# Patient Record
Sex: FEMALE | Race: WHITE | NOT HISPANIC OR LATINO
[De-identification: names, ages, dates, MRNs, and addresses within clinical notes are randomized per-mention and may not be internally consistent; named-entity substitution may affect disease eponyms.]

---

## 2017-03-20 ENCOUNTER — APPOINTMENT (OUTPATIENT)
Dept: DERMATOLOGY | Facility: CLINIC | Age: 72
End: 2017-03-20

## 2017-05-18 ENCOUNTER — APPOINTMENT (OUTPATIENT)
Dept: GASTROENTEROLOGY | Facility: CLINIC | Age: 72
End: 2017-05-18

## 2017-05-18 VITALS
BODY MASS INDEX: 24.84 KG/M2 | HEART RATE: 68 BPM | WEIGHT: 135 LBS | DIASTOLIC BLOOD PRESSURE: 70 MMHG | HEIGHT: 62 IN | OXYGEN SATURATION: 98 % | RESPIRATION RATE: 16 BRPM | SYSTOLIC BLOOD PRESSURE: 100 MMHG

## 2017-05-18 RX ORDER — MV-MIN/FOLIC/VIT K/LYCOP/COQ10 200-100MCG
CAPSULE ORAL
Refills: 0 | Status: ACTIVE | COMMUNITY

## 2017-07-24 ENCOUNTER — APPOINTMENT (OUTPATIENT)
Dept: INTERNAL MEDICINE | Facility: CLINIC | Age: 72
End: 2017-07-24

## 2017-10-09 ENCOUNTER — RX RENEWAL (OUTPATIENT)
Age: 72
End: 2017-10-09

## 2018-08-10 ENCOUNTER — RECORD ABSTRACTING (OUTPATIENT)
Age: 73
End: 2018-08-10

## 2018-08-10 DIAGNOSIS — R41.3 OTHER AMNESIA: ICD-10-CM

## 2018-08-10 DIAGNOSIS — Z83.42 FAMILY HISTORY OF FAMILIAL HYPERCHOLESTEROLEMIA: ICD-10-CM

## 2018-08-10 DIAGNOSIS — Z87.898 PERSONAL HISTORY OF OTHER SPECIFIED CONDITIONS: ICD-10-CM

## 2018-08-10 DIAGNOSIS — Z78.9 OTHER SPECIFIED HEALTH STATUS: ICD-10-CM

## 2018-08-10 DIAGNOSIS — M48.02 SPINAL STENOSIS, CERVICAL REGION: ICD-10-CM

## 2018-08-10 DIAGNOSIS — Z87.891 PERSONAL HISTORY OF NICOTINE DEPENDENCE: ICD-10-CM

## 2018-08-10 RX ORDER — ASPIRIN 81 MG
81 TABLET, DELAYED RELEASE (ENTERIC COATED) ORAL DAILY
Refills: 0 | Status: ACTIVE | COMMUNITY

## 2018-08-10 RX ORDER — FLUTICASONE PROPIONATE 50 UG/1
50 SPRAY, METERED NASAL DAILY
Refills: 0 | Status: ACTIVE | COMMUNITY

## 2018-09-18 ENCOUNTER — APPOINTMENT (OUTPATIENT)
Dept: INTERNAL MEDICINE | Facility: CLINIC | Age: 73
End: 2018-09-18
Payer: MEDICARE

## 2018-09-18 VITALS
HEIGHT: 62 IN | BODY MASS INDEX: 25.06 KG/M2 | DIASTOLIC BLOOD PRESSURE: 75 MMHG | WEIGHT: 136.2 LBS | SYSTOLIC BLOOD PRESSURE: 130 MMHG

## 2018-09-18 DIAGNOSIS — E61.1 IRON DEFICIENCY: ICD-10-CM

## 2018-09-18 DIAGNOSIS — Z00.00 ENCOUNTER FOR GENERAL ADULT MEDICAL EXAMINATION W/OUT ABNORMAL FINDINGS: ICD-10-CM

## 2018-09-18 PROCEDURE — G0439: CPT

## 2018-09-18 PROCEDURE — 36415 COLL VENOUS BLD VENIPUNCTURE: CPT

## 2018-09-18 NOTE — PLAN
[FreeTextEntry1] : PT HERE FOR ANNUAL FEELMIKE HAS APPT WITH CARDIOLOGY NEXT WEEK\par BP LOWER ON B BLOCKER AND 50 ALDACTONE FROM DERM WILL CONSIDER TAPER OFF BBLOCKER BUT SHE WILL REVIEWE WITH CARDIOLOGY\par WILL CHECK LABS PT WITH OSTEOPOROSIS MOOKIE REQUESTS WORK UP FOR OSTEOPOROSISI\par ALSO WITH HX OF IRON   OVERLOAD WILL CHECK LABS

## 2018-09-18 NOTE — HISTORY OF PRESENT ILLNESS
[FreeTextEntry1] : ANNUAL EXAM [de-identified] : PT HERE FOR ANNUAL EXAM FEELS OK \par ON ALDACTONE  NOW FOR HAIR LOSS BUT SAYS HER BP HAS DROPPED

## 2018-10-10 LAB
25(OH)D3 SERPL-MCNC: 24.9 NG/ML
ALBUMIN SERPL ELPH-MCNC: 4.8 G/DL
ALP BLD-CCNC: 67 U/L
ALT SERPL-CCNC: 19 U/L
ANION GAP SERPL CALC-SCNC: 13 MMOL/L
AST SERPL-CCNC: 19 U/L
BASOPHILS # BLD AUTO: 0.02 K/UL
BASOPHILS NFR BLD AUTO: 0.3 %
BILIRUB SERPL-MCNC: 0.7 MG/DL
BUN SERPL-MCNC: 20 MG/DL
CALCIUM SERPL-MCNC: 9.8 MG/DL
CALCIUM SERPL-MCNC: 9.8 MG/DL
CHLORIDE SERPL-SCNC: 99 MMOL/L
CHOLEST SERPL-MCNC: 214 MG/DL
CHOLEST/HDLC SERPL: 5.4 RATIO
CO2 SERPL-SCNC: 26 MMOL/L
CREAT SERPL-MCNC: 0.91 MG/DL
EOSINOPHIL # BLD AUTO: 0.13 K/UL
EOSINOPHIL NFR BLD AUTO: 2.2 %
FERRITIN SERPL-MCNC: 147 NG/ML
GLUCOSE SERPL-MCNC: 88 MG/DL
HBA1C MFR BLD HPLC: 5.5 %
HCT VFR BLD CALC: 39.7 %
HDLC SERPL-MCNC: 40 MG/DL
HGB BLD-MCNC: 13.9 G/DL
IMM GRANULOCYTES NFR BLD AUTO: 0.2 %
IRON SERPL-MCNC: 105 UG/DL
LDLC SERPL CALC-MCNC: NORMAL
LYMPHOCYTES # BLD AUTO: 1.43 K/UL
LYMPHOCYTES NFR BLD AUTO: 24.4 %
MAN DIFF?: NORMAL
MCHC RBC-ENTMCNC: 31.9 PG
MCHC RBC-ENTMCNC: 35 GM/DL
MCV RBC AUTO: 91.1 FL
MONOCYTES # BLD AUTO: 0.45 K/UL
MONOCYTES NFR BLD AUTO: 7.7 %
NEUTROPHILS # BLD AUTO: 3.81 K/UL
NEUTROPHILS NFR BLD AUTO: 65.2 %
PARATHYROID HORMONE INTACT: 63 PG/ML
PHOSPHATE SERPL-MCNC: 3.3 MG/DL
PLATELET # BLD AUTO: 125 K/UL
POTASSIUM SERPL-SCNC: 5.2 MMOL/L
PROT SERPL-MCNC: 7.7 G/DL
RBC # BLD: 4.36 M/UL
RBC # FLD: 12.9 %
SODIUM SERPL-SCNC: 138 MMOL/L
T4 SERPL-MCNC: 5.5 UG/DL
TRIGL SERPL-MCNC: 401 MG/DL
TSH SERPL-ACNC: 2.99 UIU/ML
WBC # FLD AUTO: 5.85 K/UL

## 2018-10-25 ENCOUNTER — MEDICATION RENEWAL (OUTPATIENT)
Age: 73
End: 2018-10-25

## 2018-12-14 DIAGNOSIS — R71.0 PRECIPITOUS DROP IN HEMATOCRIT: ICD-10-CM

## 2019-01-17 ENCOUNTER — MEDICATION RENEWAL (OUTPATIENT)
Age: 74
End: 2019-01-17

## 2019-01-28 ENCOUNTER — MEDICATION RENEWAL (OUTPATIENT)
Age: 74
End: 2019-01-28

## 2019-03-04 PROBLEM — R41.3 TRANSIENT AMNESIA: Status: ACTIVE | Noted: 2018-08-10

## 2019-04-01 ENCOUNTER — APPOINTMENT (OUTPATIENT)
Dept: UROLOGY | Facility: CLINIC | Age: 74
End: 2019-04-01
Payer: MEDICARE

## 2019-04-01 VITALS — DIASTOLIC BLOOD PRESSURE: 70 MMHG | SYSTOLIC BLOOD PRESSURE: 153 MMHG | HEART RATE: 69 BPM

## 2019-04-01 LAB
BILIRUB UR QL STRIP: NORMAL
CLARITY UR: CLEAR
COLLECTION METHOD: NORMAL
GLUCOSE UR-MCNC: NORMAL
HCG UR QL: 1 EU/DL
HGB UR QL STRIP.AUTO: NORMAL
KETONES UR-MCNC: NORMAL
LEUKOCYTE ESTERASE UR QL STRIP: NORMAL
NITRITE UR QL STRIP: NORMAL
PH UR STRIP: 6
PROT UR STRIP-MCNC: NORMAL
SP GR UR STRIP: 1.02

## 2019-04-01 PROCEDURE — 81003 URINALYSIS AUTO W/O SCOPE: CPT | Mod: QW

## 2019-04-01 PROCEDURE — 99204 OFFICE O/P NEW MOD 45 MIN: CPT | Mod: 25

## 2019-04-01 RX ORDER — OMEPRAZOLE 40 MG/1
40 CAPSULE, DELAYED RELEASE ORAL DAILY
Qty: 90 | Refills: 3 | Status: DISCONTINUED | COMMUNITY
End: 2019-04-01

## 2019-04-01 NOTE — HISTORY OF PRESENT ILLNESS
[FreeTextEntry1] : patient had gross hematuria 2 weeks ago. no dysuria frequency. no history of kidney stones. no flank or CVA pain.  no nausea or vomiting.

## 2019-04-01 NOTE — REVIEW OF SYSTEMS
[Dry Eyes] : dryness of the eyes [Constipation] : constipation [Heartburn] : heartburn [Blood in urine that you can see] : blood visible in urine [Wake up at night to urinate  How many times?  ___] : wakes up to urinate [unfilled] times during the night [Leakage of urine with straining, coughing, laughing] : leakage of urine with straining, coughing, laughing [Negative] : Heme/Lymph

## 2019-04-01 NOTE — ASSESSMENT
[FreeTextEntry1] : gross hematuria\par \par Plan:\par \par follow up 2 weeks for cysto\par CT urogram\par urine cytology\par cysto

## 2019-04-01 NOTE — LETTER BODY
[Dear  ___] : Dear  [unfilled], [Courtesy Letter:] : I had the pleasure of seeing your patient, [unfilled], in my office today. [Please see my note below.] : Please see my note below. [Sincerely,] : Sincerely, [FreeTextEntry3] : Ed\par \par Lacho Sandhu MD\par [facility]\par  of Urology\par [employer] at [facility]\par

## 2019-04-03 ENCOUNTER — FORM ENCOUNTER (OUTPATIENT)
Age: 74
End: 2019-04-03

## 2019-04-03 LAB — URINE CYTOLOGY: NORMAL

## 2019-04-04 ENCOUNTER — APPOINTMENT (OUTPATIENT)
Dept: CT IMAGING | Facility: CLINIC | Age: 74
End: 2019-04-04
Payer: MEDICARE

## 2019-04-04 ENCOUNTER — OUTPATIENT (OUTPATIENT)
Dept: OUTPATIENT SERVICES | Facility: HOSPITAL | Age: 74
LOS: 1 days | End: 2019-04-04
Payer: MEDICARE

## 2019-04-04 DIAGNOSIS — Z90.49 ACQUIRED ABSENCE OF OTHER SPECIFIED PARTS OF DIGESTIVE TRACT: Chronic | ICD-10-CM

## 2019-04-04 DIAGNOSIS — R31.0 GROSS HEMATURIA: ICD-10-CM

## 2019-04-04 DIAGNOSIS — G56.00 CARPAL TUNNEL SYNDROME, UNSPECIFIED UPPER LIMB: Chronic | ICD-10-CM

## 2019-04-04 DIAGNOSIS — Z00.00 ENCOUNTER FOR GENERAL ADULT MEDICAL EXAMINATION WITHOUT ABNORMAL FINDINGS: ICD-10-CM

## 2019-04-04 PROCEDURE — 74178 CT ABD&PLV WO CNTR FLWD CNTR: CPT

## 2019-04-04 PROCEDURE — 82565 ASSAY OF CREATININE: CPT

## 2019-04-04 PROCEDURE — 74178 CT ABD&PLV WO CNTR FLWD CNTR: CPT | Mod: 26

## 2019-04-15 ENCOUNTER — APPOINTMENT (OUTPATIENT)
Dept: UROLOGY | Facility: CLINIC | Age: 74
End: 2019-04-15
Payer: MEDICARE

## 2019-04-15 ENCOUNTER — TRANSCRIPTION ENCOUNTER (OUTPATIENT)
Age: 74
End: 2019-04-15

## 2019-04-15 VITALS — DIASTOLIC BLOOD PRESSURE: 84 MMHG | RESPIRATION RATE: 15 BRPM | HEART RATE: 65 BPM | SYSTOLIC BLOOD PRESSURE: 187 MMHG

## 2019-04-15 LAB
BILIRUB UR QL STRIP: NORMAL
CLARITY UR: CLEAR
COLLECTION METHOD: NORMAL
GLUCOSE UR-MCNC: NORMAL
HCG UR QL: 0.2 EU/DL
HGB UR QL STRIP.AUTO: ABNORMAL
KETONES UR-MCNC: NORMAL
LEUKOCYTE ESTERASE UR QL STRIP: ABNORMAL
NITRITE UR QL STRIP: NORMAL
PH UR STRIP: 6
PROT UR STRIP-MCNC: NORMAL
SP GR UR STRIP: 1.02

## 2019-04-15 PROCEDURE — 81003 URINALYSIS AUTO W/O SCOPE: CPT | Mod: QW

## 2019-04-15 PROCEDURE — 52000 CYSTOURETHROSCOPY: CPT

## 2019-04-25 ENCOUNTER — OUTPATIENT (OUTPATIENT)
Dept: OUTPATIENT SERVICES | Facility: HOSPITAL | Age: 74
LOS: 1 days | End: 2019-04-25
Payer: MEDICARE

## 2019-04-25 VITALS
HEIGHT: 62 IN | HEART RATE: 61 BPM | TEMPERATURE: 97 F | SYSTOLIC BLOOD PRESSURE: 143 MMHG | DIASTOLIC BLOOD PRESSURE: 68 MMHG | WEIGHT: 135.58 LBS | RESPIRATION RATE: 20 BRPM

## 2019-04-25 DIAGNOSIS — Z01.818 ENCOUNTER FOR OTHER PREPROCEDURAL EXAMINATION: ICD-10-CM

## 2019-04-25 DIAGNOSIS — Z90.49 ACQUIRED ABSENCE OF OTHER SPECIFIED PARTS OF DIGESTIVE TRACT: Chronic | ICD-10-CM

## 2019-04-25 DIAGNOSIS — I10 ESSENTIAL (PRIMARY) HYPERTENSION: ICD-10-CM

## 2019-04-25 DIAGNOSIS — G56.00 CARPAL TUNNEL SYNDROME, UNSPECIFIED UPPER LIMB: Chronic | ICD-10-CM

## 2019-04-25 DIAGNOSIS — D49.4 NEOPLASM OF UNSPECIFIED BEHAVIOR OF BLADDER: ICD-10-CM

## 2019-04-25 DIAGNOSIS — Z29.9 ENCOUNTER FOR PROPHYLACTIC MEASURES, UNSPECIFIED: ICD-10-CM

## 2019-04-25 DIAGNOSIS — Z98.890 OTHER SPECIFIED POSTPROCEDURAL STATES: Chronic | ICD-10-CM

## 2019-04-25 LAB
ANION GAP SERPL CALC-SCNC: 14 MMOL/L — SIGNIFICANT CHANGE UP (ref 5–17)
APPEARANCE UR: CLEAR — SIGNIFICANT CHANGE UP
APTT BLD: 28.6 SEC — SIGNIFICANT CHANGE UP (ref 27.5–36.3)
BACTERIA # UR AUTO: ABNORMAL
BASOPHILS # BLD AUTO: 0 K/UL — SIGNIFICANT CHANGE UP (ref 0–0.2)
BASOPHILS NFR BLD AUTO: 0.3 % — SIGNIFICANT CHANGE UP (ref 0–2)
BILIRUB UR-MCNC: NEGATIVE — SIGNIFICANT CHANGE UP
BUN SERPL-MCNC: 23 MG/DL — HIGH (ref 8–20)
CALCIUM SERPL-MCNC: 9.8 MG/DL — SIGNIFICANT CHANGE UP (ref 8.6–10.2)
CHLORIDE SERPL-SCNC: 100 MMOL/L — SIGNIFICANT CHANGE UP (ref 98–107)
CO2 SERPL-SCNC: 25 MMOL/L — SIGNIFICANT CHANGE UP (ref 22–29)
COLOR SPEC: YELLOW — SIGNIFICANT CHANGE UP
CREAT SERPL-MCNC: 0.86 MG/DL — SIGNIFICANT CHANGE UP (ref 0.5–1.3)
DIFF PNL FLD: NEGATIVE — SIGNIFICANT CHANGE UP
EOSINOPHIL # BLD AUTO: 0.1 K/UL — SIGNIFICANT CHANGE UP (ref 0–0.5)
EOSINOPHIL NFR BLD AUTO: 2 % — SIGNIFICANT CHANGE UP (ref 0–6)
EPI CELLS # UR: NEGATIVE — SIGNIFICANT CHANGE UP
GLUCOSE SERPL-MCNC: 119 MG/DL — HIGH (ref 70–115)
GLUCOSE UR QL: NEGATIVE MG/DL — SIGNIFICANT CHANGE UP
HCT VFR BLD CALC: 39.1 % — SIGNIFICANT CHANGE UP (ref 37–47)
HGB BLD-MCNC: 13.6 G/DL — SIGNIFICANT CHANGE UP (ref 12–16)
INR BLD: 0.96 RATIO — SIGNIFICANT CHANGE UP (ref 0.88–1.16)
KETONES UR-MCNC: NEGATIVE — SIGNIFICANT CHANGE UP
LEUKOCYTE ESTERASE UR-ACNC: ABNORMAL
LYMPHOCYTES # BLD AUTO: 1.2 K/UL — SIGNIFICANT CHANGE UP (ref 1–4.8)
LYMPHOCYTES # BLD AUTO: 19.9 % — LOW (ref 20–55)
MCHC RBC-ENTMCNC: 31.2 PG — HIGH (ref 27–31)
MCHC RBC-ENTMCNC: 34.8 G/DL — SIGNIFICANT CHANGE UP (ref 32–36)
MCV RBC AUTO: 89.7 FL — SIGNIFICANT CHANGE UP (ref 81–99)
MONOCYTES # BLD AUTO: 0.4 K/UL — SIGNIFICANT CHANGE UP (ref 0–0.8)
MONOCYTES NFR BLD AUTO: 7 % — SIGNIFICANT CHANGE UP (ref 3–10)
NEUTROPHILS # BLD AUTO: 4.1 K/UL — SIGNIFICANT CHANGE UP (ref 1.8–8)
NEUTROPHILS NFR BLD AUTO: 70.5 % — SIGNIFICANT CHANGE UP (ref 37–73)
NITRITE UR-MCNC: NEGATIVE — SIGNIFICANT CHANGE UP
PH UR: 6 — SIGNIFICANT CHANGE UP (ref 5–8)
PLATELET # BLD AUTO: 212 K/UL — SIGNIFICANT CHANGE UP (ref 150–400)
POTASSIUM SERPL-MCNC: 4.8 MMOL/L — SIGNIFICANT CHANGE UP (ref 3.5–5.3)
POTASSIUM SERPL-SCNC: 4.8 MMOL/L — SIGNIFICANT CHANGE UP (ref 3.5–5.3)
PROT UR-MCNC: 15 MG/DL
PROTHROM AB SERPL-ACNC: 11 SEC — SIGNIFICANT CHANGE UP (ref 10–12.9)
RBC # BLD: 4.36 M/UL — LOW (ref 4.4–5.2)
RBC # FLD: 12.5 % — SIGNIFICANT CHANGE UP (ref 11–15.6)
RBC CASTS # UR COMP ASSIST: NEGATIVE /HPF — SIGNIFICANT CHANGE UP (ref 0–4)
SODIUM SERPL-SCNC: 139 MMOL/L — SIGNIFICANT CHANGE UP (ref 135–145)
SP GR SPEC: 1.02 — SIGNIFICANT CHANGE UP (ref 1.01–1.02)
UROBILINOGEN FLD QL: NEGATIVE MG/DL — SIGNIFICANT CHANGE UP
WBC # BLD: 5.9 K/UL — SIGNIFICANT CHANGE UP (ref 4.8–10.8)
WBC # FLD AUTO: 5.9 K/UL — SIGNIFICANT CHANGE UP (ref 4.8–10.8)
WBC UR QL: ABNORMAL

## 2019-04-25 PROCEDURE — 81001 URINALYSIS AUTO W/SCOPE: CPT

## 2019-04-25 PROCEDURE — 85610 PROTHROMBIN TIME: CPT

## 2019-04-25 PROCEDURE — 93005 ELECTROCARDIOGRAM TRACING: CPT

## 2019-04-25 PROCEDURE — 36415 COLL VENOUS BLD VENIPUNCTURE: CPT

## 2019-04-25 PROCEDURE — 80048 BASIC METABOLIC PNL TOTAL CA: CPT

## 2019-04-25 PROCEDURE — 93010 ELECTROCARDIOGRAM REPORT: CPT

## 2019-04-25 PROCEDURE — 85730 THROMBOPLASTIN TIME PARTIAL: CPT

## 2019-04-25 PROCEDURE — G0463: CPT

## 2019-04-25 PROCEDURE — 85027 COMPLETE CBC AUTOMATED: CPT

## 2019-04-25 PROCEDURE — 87086 URINE CULTURE/COLONY COUNT: CPT

## 2019-04-25 RX ORDER — SODIUM CHLORIDE 9 MG/ML
3 INJECTION INTRAMUSCULAR; INTRAVENOUS; SUBCUTANEOUS ONCE
Qty: 0 | Refills: 0 | Status: DISCONTINUED | OUTPATIENT
Start: 2019-04-30 | End: 2019-05-15

## 2019-04-25 NOTE — H&P PST ADULT - NSICDXPASTMEDICALHX_GEN_ALL_CORE_FT
PAST MEDICAL HISTORY:  Bone tumor (benign) left hand    HLD (hyperlipidemia)     HTN (hypertension) PAST MEDICAL HISTORY:  Bone tumor (benign) left hand    HLD (hyperlipidemia)     HTN (hypertension)     OA (osteoarthritis)

## 2019-04-25 NOTE — H&P PST ADULT - HISTORY OF PRESENT ILLNESS
73 year old female present with c/o gross hematuria.  Patient state work up revealed bladder tumor. She is now scheduled for trans urethral resection of bladder tumor, mitomycin instillation.

## 2019-04-25 NOTE — H&P PST ADULT - NSANTHOSAYNRD_GEN_A_CORE
No. MADELINE screening performed.  STOP BANG Legend: 0-2 = LOW Risk; 3-4 = INTERMEDIATE Risk; 5-8 = HIGH Risk

## 2019-04-25 NOTE — H&P PST ADULT - ASSESSMENT
73 year old female present with c/o gross hematuria.  Patient state work up revealed bladder tumor. She is now scheduled for trans urethral resection of bladder tumor, mitomycin instillation.  CAPRINI VTE 2.0 SCORE [CLOT updated 2019]    AGE RELATED RISK FACTORS                                                       MOBILITY RELATED FACTORS  [ ] Age 41-60 years                                            (1 Point)                    [ ] Bed rest                                                        (1 Point)  [ x] Age: 61-74 years                                           (2 Points)                  [ ] Plaster cast                                                   (2 Points)  [ ] Age= 75 years                                              (3 Points)                    [ ] Bed bound for more than 72 hours                 (2 Points)    DISEASE RELATED RISK FACTORS                                               GENDER SPECIFIC FACTORS  [ ] Edema in the lower extremities                       (1 Point)              [ ] Pregnancy                                                     (1 Point)  [ x] Varicose veins                                               (1 Point)                     [ ] Post-partum < 6 weeks                                   (1 Point)             [ x] BMI > 25 Kg/m2                                            (1 Point)                     [ ] Hormonal therapy  or oral contraception          (1 Point)                 [ ] Sepsis (in the previous month)                        (1 Point)               [ ] History of pregnancy complications                 (1 point)  [ ] Pneumonia or serious lung disease                                               [ ] Unexplained or recurrent                     (1 Point)           (in the previous month)                               (1 Point)  [ ] Abnormal pulmonary function test                     (1 Point)                 SURGERY RELATED RISK FACTORS  [ ] Acute myocardial infarction                              (1 Point)               [ ]  Section                                             (1 Point)  [ ] Congestive heart failure (in the previous month)  (1 Point)      [ ] Minor surgery                                                  (1 Point)   [ ] Inflammatory bowel disease                             (1 Point)               [ ] Arthroscopic surgery                                        (2 Points)  [ ] Central venous access                                      (2 Points)                [ x] General surgery lasting more than 45 minutes (2 points)  [x ] Malignancy- Present or previous                   (2 Points)                [ ] Elective arthroplasty                                         (5 points)    [ ] Stroke (in the previous month)                          (5 Points)                                                                                                                                                           HEMATOLOGY RELATED FACTORS                                                 TRAUMA RELATED RISK FACTORS  [ ] Prior episodes of VTE                                     (3 Points)                [ ] Fracture of the hip, pelvis, or leg                       (5 Points)  [ ] Positive family history for VTE                         (3 Points)             [ ] Acute spinal cord injury (in the previous month)  (5 Points)  [ ] Prothrombin 41395 A                                     (3 Points)               [ ] Paralysis  (less than 1 month)                             (5 Points)  [ ] Factor V Leiden                                             (3 Points)                  [ ] Multiple Trauma within 1 month                        (5 Points)  [ ] Lupus anticoagulants                                     (3 Points)                                                           [ ] Anticardiolipin antibodies                               (3 Points)                                                       [ ] High homocysteine in the blood                      (3 Points)                                             [ ] Other congenital or acquired thrombophilia      (3 Points)                                                [ ] Heparin induced thrombocytopenia                  (3 Points)                                     Total Score [   8   ]  OPIOID RISK TOOL    TRICIA EACH BOX THAT APPLIES AND ADD TOTALS AT THE END    FAMILY HISTORY OF SUBSTANCE ABUSE                 FEMALE         MALE                                                Alcohol                             [  ]1 pt          [  ]3pts                                               Illegal Durgs                     [  ]2 pts        [  ]3pts                                               Rx Drugs                           [  ]4 pts        [  ]4 pts    PERSONAL HISTORY OF SUBSTANCE ABUSE                                                                                          Alcohol                             [  ]3 pts       [  ]3 pts                                               Illegal Drugs                     [  ]4 pts        [  ]4 pts                                               Rx Drugs                           [  ]5 pts        [  ]5 pts    AGE BETWEEN 16-45 YEARS                                      [  ]1 pt         [  ]1 pt    HISTORY OF PREADOLESCENT   SEXUAL ABUSE                                                             [  ]3 pts        [  ]0pts    PSYCHOLOGICAL DISEASE                     ADD, OCD, Bipolar, Schizophrenia        [  ]2 pts         [  ]2 pts                      Depression                                               [  ]1 pt           [  ]1 pt           SCORING TOTAL   (add numbers and type here)              (***)                                     A score of 3 or lower indicated LOW risk for future opioid abuse  A score of 4 to 7 indicated moderate risk for future opioid abuse  A score of 8 or higher indicates a high risk for opioid abuse

## 2019-04-25 NOTE — H&P PST ADULT - NSICDXPROBLEM_GEN_ALL_CORE_FT
PROBLEM DIAGNOSES  Problem: Need for prophylactic measure  Assessment and Plan: high risk, the sugical team will evaluate for appropriate VTE prophylaxis     Problem: HTN (hypertension)  Assessment and Plan: routine labs and ekg  Medical clearance     Problem: Bladder tumor  Assessment and Plan:  trans urethral resection of bladder tumor, mitomycin instillation. PROBLEM DIAGNOSES  Problem: Need for prophylactic measure  Assessment and Plan: high risk, the surgical team will evaluate for appropriate VTE prophylaxis     Problem: HTN (hypertension)  Assessment and Plan: routine labs and ekg  Medical clearance     Problem: Bladder tumor  Assessment and Plan:  trans urethral resection of bladder tumor, mitomycin instillation.

## 2019-04-25 NOTE — H&P PST ADULT - NSICDXFAMILYHX_GEN_ALL_CORE_FT
FAMILY HISTORY:  Father  Still living? Unknown  Family history of early CAD, Age at diagnosis: Age Unknown    Mother  Still living? Unknown  FH: hypertension, Age at diagnosis: Age Unknown    Sibling  Still living? Unknown  Family history of benign bladder tumor, Age at diagnosis: Age Unknown

## 2019-04-25 NOTE — H&P PST ADULT - NEGATIVE PSYCHIATRIC SYMPTOMS
no depression/no anxiety/no insomnia/no suicidal ideation no suicidal ideation/no insomnia/no depression

## 2019-04-25 NOTE — H&P PST ADULT - NSICDXPASTSURGICALHX_GEN_ALL_CORE_FT
PAST SURGICAL HISTORY:  Carpal tunnel syndrome     S/P appendectomy PAST SURGICAL HISTORY:  Carpal tunnel syndrome     History of cryosurgery left finger    S/P appendectomy

## 2019-04-26 LAB
CULTURE RESULTS: SIGNIFICANT CHANGE UP
SPECIMEN SOURCE: SIGNIFICANT CHANGE UP

## 2019-04-29 ENCOUNTER — TRANSCRIPTION ENCOUNTER (OUTPATIENT)
Age: 74
End: 2019-04-29

## 2019-04-29 ENCOUNTER — APPOINTMENT (OUTPATIENT)
Dept: INTERNAL MEDICINE | Facility: CLINIC | Age: 74
End: 2019-04-29
Payer: MEDICARE

## 2019-04-29 VITALS
WEIGHT: 135.25 LBS | HEART RATE: 69 BPM | HEIGHT: 62 IN | BODY MASS INDEX: 24.89 KG/M2 | SYSTOLIC BLOOD PRESSURE: 124 MMHG | OXYGEN SATURATION: 98 % | TEMPERATURE: 98 F | DIASTOLIC BLOOD PRESSURE: 80 MMHG

## 2019-04-29 PROCEDURE — 99214 OFFICE O/P EST MOD 30 MIN: CPT | Mod: 25

## 2019-04-29 PROCEDURE — 99358 PROLONG SERVICE W/O CONTACT: CPT

## 2019-04-29 NOTE — HISTORY OF PRESENT ILLNESS
[No Pertinent Cardiac History] : no history of aortic stenosis, atrial fibrillation, coronary artery disease, recent myocardial infarction, or implantable device/pacemaker [No Pertinent Pulmonary History] : no history of asthma, COPD, sleep apnea, or smoking [No Adverse Anesthesia Reaction] : no adverse anesthesia reaction in self or family member [Chronic Anticoagulation] : no chronic anticoagulation [Chronic Kidney Disease] : no chronic kidney disease [Diabetes] : no diabetes [FreeTextEntry1] : TUR Bladder Lumen [FreeTextEntry2] : 04/30/19 [FreeTextEntry3] : Dr. Sandhu [FreeTextEntry4] : Ms. NINA DUTTA is a 73 year female with a PMH of HTN, HLD comes to the office c/o for preoperative evaluation for TUR Bladder Lumen. Patient feels well and has no complaints at this time.

## 2019-04-29 NOTE — PLAN
[FreeTextEntry1] : Ms. NINA DUTTA is a 73 year female with a PMH of HTN, HLD comes to the office c/o for preoperative evaluation for TUR Bladder Lumen. Patient feels well and has no complaints at this time. Patient has greater than 4 METS, is a low perioperative risk for Major adverse cardiac event. ECG and blood work reviewed. No further testing indicated at this time. Patient is medically optimized for this procedure. \par \par Prior to patient's arrival, extensive medical records were reviewed including but not limited to, Hospital records records, out patient records, laboratory data and microbiology data \par In addition extensive time was also spent in reviewing diagnostic studies.\par \par The duration of the review took 35 minutes \par \par Counseling included abnormal lab results, differential diagnoses, treatment options, risks and benefits, lifestyle changes, prognosis, current condition, medications, and dose adjustments. \par The patient was interactive, attentive, asked questions, and verbalized understanding

## 2019-04-29 NOTE — ASSESSMENT
[Patient Optimized for Surgery] : Patient optimized for surgery [No Further Testing Recommended] : no further testing recommended [As per surgery] : as per surgery [FreeTextEntry4] : Ms. NINA DUTTA is a 73 year female with a PMH of HTN, HLD comes to the office c/o for preoperative evaluation for TUR Bladder Lumen. Patient feels well and has no complaints at this time. Patient has greater than 4 METS, is a low perioperative risk for Major adverse cardiac event. ECG and blood work reviewed. No further testing indicated at this time. Patient is medically optimized for this procedure. \par

## 2019-04-30 ENCOUNTER — RESULT REVIEW (OUTPATIENT)
Age: 74
End: 2019-04-30

## 2019-04-30 ENCOUNTER — OUTPATIENT (OUTPATIENT)
Dept: INPATIENT UNIT | Facility: HOSPITAL | Age: 74
LOS: 1 days | End: 2019-04-30
Payer: MEDICARE

## 2019-04-30 ENCOUNTER — APPOINTMENT (OUTPATIENT)
Age: 74
End: 2019-04-30

## 2019-04-30 VITALS
OXYGEN SATURATION: 98 % | SYSTOLIC BLOOD PRESSURE: 140 MMHG | TEMPERATURE: 98 F | DIASTOLIC BLOOD PRESSURE: 72 MMHG | HEART RATE: 62 BPM | RESPIRATION RATE: 15 BRPM

## 2019-04-30 VITALS
OXYGEN SATURATION: 100 % | HEIGHT: 62 IN | HEART RATE: 77 BPM | TEMPERATURE: 97 F | SYSTOLIC BLOOD PRESSURE: 179 MMHG | DIASTOLIC BLOOD PRESSURE: 53 MMHG | RESPIRATION RATE: 16 BRPM | WEIGHT: 135.58 LBS

## 2019-04-30 DIAGNOSIS — Z90.49 ACQUIRED ABSENCE OF OTHER SPECIFIED PARTS OF DIGESTIVE TRACT: Chronic | ICD-10-CM

## 2019-04-30 DIAGNOSIS — D49.4 NEOPLASM OF UNSPECIFIED BEHAVIOR OF BLADDER: ICD-10-CM

## 2019-04-30 DIAGNOSIS — Z98.890 OTHER SPECIFIED POSTPROCEDURAL STATES: Chronic | ICD-10-CM

## 2019-04-30 DIAGNOSIS — G56.00 CARPAL TUNNEL SYNDROME, UNSPECIFIED UPPER LIMB: Chronic | ICD-10-CM

## 2019-04-30 PROCEDURE — 88305 TISSUE EXAM BY PATHOLOGIST: CPT

## 2019-04-30 PROCEDURE — 88305 TISSUE EXAM BY PATHOLOGIST: CPT | Mod: 26

## 2019-04-30 PROCEDURE — 88112 CYTOPATH CELL ENHANCE TECH: CPT | Mod: 26

## 2019-04-30 PROCEDURE — 52235 CYSTOSCOPY AND TREATMENT: CPT

## 2019-04-30 PROCEDURE — 88112 CYTOPATH CELL ENHANCE TECH: CPT

## 2019-04-30 RX ORDER — MITOMYCIN 5 MG/10ML
40 INJECTION, POWDER, LYOPHILIZED, FOR SOLUTION INTRAVENOUS ONCE
Qty: 0 | Refills: 0 | Status: DISCONTINUED | OUTPATIENT
Start: 2019-04-30 | End: 2019-04-30

## 2019-04-30 RX ORDER — SODIUM CHLORIDE 9 MG/ML
1000 INJECTION, SOLUTION INTRAVENOUS
Qty: 0 | Refills: 0 | Status: DISCONTINUED | OUTPATIENT
Start: 2019-04-30 | End: 2019-05-01

## 2019-04-30 RX ORDER — KETOROLAC TROMETHAMINE 30 MG/ML
15 SYRINGE (ML) INJECTION ONCE
Qty: 0 | Refills: 0 | Status: DISCONTINUED | OUTPATIENT
Start: 2019-04-30 | End: 2019-04-30

## 2019-04-30 RX ORDER — OXYBUTYNIN CHLORIDE 5 MG
5 TABLET ORAL THREE TIMES A DAY
Qty: 0 | Refills: 0 | Status: DISCONTINUED | OUTPATIENT
Start: 2019-04-30 | End: 2019-05-15

## 2019-04-30 RX ORDER — OXYCODONE AND ACETAMINOPHEN 5; 325 MG/1; MG/1
1 TABLET ORAL EVERY 4 HOURS
Qty: 0 | Refills: 0 | Status: DISCONTINUED | OUTPATIENT
Start: 2019-04-30 | End: 2019-04-30

## 2019-04-30 RX ORDER — CEPHALEXIN 500 MG
1 CAPSULE ORAL
Qty: 15 | Refills: 0
Start: 2019-04-30

## 2019-04-30 RX ORDER — MITOMYCIN 5 MG/10ML
40 INJECTION, POWDER, LYOPHILIZED, FOR SOLUTION INTRAVENOUS ONCE
Qty: 0 | Refills: 0 | Status: COMPLETED | OUTPATIENT
Start: 2019-04-30 | End: 2019-04-30

## 2019-04-30 RX ORDER — CEFAZOLIN SODIUM 1 G
2000 VIAL (EA) INJECTION ONCE
Qty: 0 | Refills: 0 | Status: COMPLETED | OUTPATIENT
Start: 2019-04-30 | End: 2019-04-30

## 2019-04-30 RX ORDER — FENTANYL CITRATE 50 UG/ML
25 INJECTION INTRAVENOUS
Qty: 0 | Refills: 0 | Status: DISCONTINUED | OUTPATIENT
Start: 2019-04-30 | End: 2019-05-01

## 2019-04-30 RX ORDER — OXYBUTYNIN CHLORIDE 5 MG
1 TABLET ORAL
Qty: 20 | Refills: 0
Start: 2019-04-30

## 2019-04-30 RX ORDER — SODIUM CHLORIDE 9 MG/ML
1000 INJECTION, SOLUTION INTRAVENOUS
Qty: 0 | Refills: 0 | Status: DISCONTINUED | OUTPATIENT
Start: 2019-04-30 | End: 2019-05-15

## 2019-04-30 RX ORDER — ONDANSETRON 8 MG/1
4 TABLET, FILM COATED ORAL ONCE
Qty: 0 | Refills: 0 | Status: DISCONTINUED | OUTPATIENT
Start: 2019-04-30 | End: 2019-05-01

## 2019-04-30 RX ADMIN — MITOMYCIN 20 MILLIGRAM(S): 5 INJECTION, POWDER, LYOPHILIZED, FOR SOLUTION INTRAVENOUS at 21:30

## 2019-04-30 RX ADMIN — Medication 100 MILLIGRAM(S): at 20:50

## 2019-04-30 NOTE — ASU PREOP CHECKLIST - SIDE RAILS UP
Ms. Piero Adams is a 80 y.o.  female with history of Afib who presents today for anticoagulation monitoring and adjustment. Patient verifies current dosing regimen. Patient denies s/s bleeding/bruising/swelling/SOB. No blood in urine or stool. No dietary changes. No changes in medication/OTC agents/Herbals. No change in alcohol use. No Procedures scheduled in the future at this time. Lab Results   Component Value Date    INR 1.6 03/06/2018    INR 2.9 02/16/2018    INR 3.7 01/19/2018       Patient appears well. Ms. Rogers Colindres states that some days she took \"3/4 of a tablet of warfarin because she felt too much blood was collecting near her hand\". I am a little unclear what patient means by this, her hand looks clear today. I discussed with patient the importance of taking her warfarin as instructed, to not let her INR go too low, putting her at greater risk for stroke. Patient reminded to call the Anticoagulation Clinic with any medication changes. Patient given instructions utilizing the teach back method. After visit summary printed and reviewed with patient. Warfarin dose updated. done

## 2019-04-30 NOTE — ASU DISCHARGE PLAN (ADULT/PEDIATRIC) - CALL YOUR DOCTOR IF YOU HAVE ANY OF THE FOLLOWING:
Nausea and vomiting that does not stop/Unable to urinate/Fever greater than (need to indicate Fahrenheit or Celsius)/101 F/Pain not relieved by Medications/Bleeding that does not stop/Inability to tolerate liquids or foods

## 2019-04-30 NOTE — ASU DISCHARGE PLAN (ADULT/PEDIATRIC) - CARE PROVIDER_API CALL
Lacho Sandhu)  Urology  [address], [address]  [place] [address]  Phone: [phone]  Fax: [phone]  Follow Up Time: 2 weeks

## 2019-04-30 NOTE — BRIEF OPERATIVE NOTE - NSICDXBRIEFPROCEDURE_GEN_ALL_CORE_FT
PROCEDURES:  Cystourethroscopy with bladder tumor resection, medium 30-Apr-2019 21:40:43  Lacho Sandhu

## 2019-05-01 PROBLEM — M19.90 UNSPECIFIED OSTEOARTHRITIS, UNSPECIFIED SITE: Chronic | Status: ACTIVE | Noted: 2019-04-25

## 2019-05-02 LAB
NON-GYNECOLOGICAL CYTOLOGY STUDY: SIGNIFICANT CHANGE UP
SURGICAL PATHOLOGY STUDY: SIGNIFICANT CHANGE UP

## 2019-05-14 ENCOUNTER — APPOINTMENT (OUTPATIENT)
Dept: UROLOGY | Facility: CLINIC | Age: 74
End: 2019-05-14
Payer: MEDICARE

## 2019-05-14 VITALS — DIASTOLIC BLOOD PRESSURE: 69 MMHG | SYSTOLIC BLOOD PRESSURE: 168 MMHG | HEART RATE: 78 BPM

## 2019-05-14 PROCEDURE — 99214 OFFICE O/P EST MOD 30 MIN: CPT

## 2019-05-14 NOTE — LETTER BODY
[Dear  ___] : Dear  [unfilled], [Courtesy Letter:] : I had the pleasure of seeing your patient, [unfilled], in my office today. [Please see my note below.] : Please see my note below. [Sincerely,] : Sincerely, [FreeTextEntry3] : Ed\par \par Lacho Sandhu MD\par [facility]\par  of Urology\par [employer] at [facility]\par

## 2019-05-14 NOTE — PHYSICAL EXAM
[General Appearance - Well Developed] : well developed [General Appearance - Well Nourished] : well nourished [Normal Appearance] : normal appearance [General Appearance - In No Acute Distress] : no acute distress [Well Groomed] : well groomed [Abdomen Soft] : soft [Abdomen Tenderness] : non-tender [Costovertebral Angle Tenderness] : no ~M costovertebral angle tenderness [Edema] : no peripheral edema [] : no respiratory distress [Respiration, Rhythm And Depth] : normal respiratory rhythm and effort [Exaggerated Use Of Accessory Muscles For Inspiration] : no accessory muscle use [Oriented To Time, Place, And Person] : oriented to person, place, and time [Affect] : the affect was normal [Mood] : the mood was normal [Not Anxious] : not anxious [No Focal Deficits] : no focal deficits [Normal Station and Gait] : the gait and station were normal for the patient's age [No Palpable Adenopathy] : no palpable adenopathy [FreeTextEntry1] : facial cheek rash

## 2019-05-14 NOTE — ASSESSMENT
[FreeTextEntry1] : Impression:\par \par bladder cancer, low grade\par urinary frequency\par \par Plan:\par \par detrol LA 4 mg daily\par follow up 4 weeks.

## 2019-05-14 NOTE — HISTORY OF PRESENT ILLNESS
[FreeTextEntry1] : Patient had a bladder tumor removed about two weeks.  no fever or chills.  no hematuria.  no dysuria. had some frequency. this is worse than preop but seems to be worse now.

## 2019-05-15 ENCOUNTER — OTHER (OUTPATIENT)
Age: 74
End: 2019-05-15

## 2019-05-15 RX ORDER — TOLTERODINE TARTRATE 4 MG/1
4 CAPSULE, EXTENDED RELEASE ORAL
Qty: 30 | Refills: 1 | Status: DISCONTINUED | COMMUNITY
Start: 2019-05-14 | End: 2019-05-15

## 2019-06-11 ENCOUNTER — APPOINTMENT (OUTPATIENT)
Dept: UROLOGY | Facility: CLINIC | Age: 74
End: 2019-06-11
Payer: MEDICARE

## 2019-06-11 DIAGNOSIS — Z87.448 PERSONAL HISTORY OF OTHER DISEASES OF URINARY SYSTEM: ICD-10-CM

## 2019-06-11 PROCEDURE — 99213 OFFICE O/P EST LOW 20 MIN: CPT

## 2019-06-11 RX ORDER — FESOTERODINE FUMARATE 8 MG/1
8 TABLET, FILM COATED, EXTENDED RELEASE ORAL
Qty: 30 | Refills: 2 | Status: DISCONTINUED | COMMUNITY
Start: 2019-05-15 | End: 2019-06-11

## 2019-06-11 NOTE — HISTORY OF PRESENT ILLNESS
[FreeTextEntry1] : patient was given some detrol.  no urgency.  some frequency related to diuretic. no hematuria or back pain. no fevers or chills.

## 2019-06-11 NOTE — LETTER BODY
[Dear  ___] : Dear  [unfilled], [Courtesy Letter:] : I had the pleasure of seeing your patient, [unfilled], in my office today. [Please see my note below.] : Please see my note below. [Sincerely,] : Sincerely, [FreeTextEntry3] : Ed\par \par Lacho Sandhu MD\par [facility]\par  of Urology\par [employer] at [facility]\par

## 2019-06-11 NOTE — PHYSICAL EXAM
[General Appearance - Well Developed] : well developed [General Appearance - Well Nourished] : well nourished [General Appearance - In No Acute Distress] : no acute distress [Well Groomed] : well groomed [Normal Appearance] : normal appearance [] : no rash [Oriented To Time, Place, And Person] : oriented to person, place, and time [Edema] : no peripheral edema [Mood] : the mood was normal [Affect] : the affect was normal [Not Anxious] : not anxious [Normal Station and Gait] : the gait and station were normal for the patient's age

## 2019-06-11 NOTE — ASSESSMENT
[FreeTextEntry1] : frequency, resolved, likely related to bladder surgery\par bladder cancer\par \par Plan:\par \par follow up 6 weeks for cysto

## 2019-07-18 ENCOUNTER — RX RENEWAL (OUTPATIENT)
Age: 74
End: 2019-07-18

## 2019-07-25 ENCOUNTER — APPOINTMENT (OUTPATIENT)
Dept: UROLOGY | Facility: CLINIC | Age: 74
End: 2019-07-25
Payer: MEDICARE

## 2019-07-25 VITALS — HEART RATE: 71 BPM | RESPIRATION RATE: 13 BRPM | DIASTOLIC BLOOD PRESSURE: 73 MMHG | SYSTOLIC BLOOD PRESSURE: 132 MMHG

## 2019-07-25 LAB
BILIRUB UR QL STRIP: ABNORMAL
CLARITY UR: CLEAR
COLLECTION METHOD: NORMAL
GLUCOSE UR-MCNC: NORMAL
HCG UR QL: 0.2 EU/DL
HGB UR QL STRIP.AUTO: ABNORMAL
KETONES UR-MCNC: NORMAL
LEUKOCYTE ESTERASE UR QL STRIP: ABNORMAL
NITRITE UR QL STRIP: NORMAL
PH UR STRIP: 5.5
PROT UR STRIP-MCNC: ABNORMAL
SP GR UR STRIP: 1.03

## 2019-07-25 PROCEDURE — 51798 US URINE CAPACITY MEASURE: CPT

## 2019-07-25 PROCEDURE — 81003 URINALYSIS AUTO W/O SCOPE: CPT | Mod: QW

## 2019-07-25 PROCEDURE — 99214 OFFICE O/P EST MOD 30 MIN: CPT | Mod: 25

## 2019-07-25 NOTE — PHYSICAL EXAM
[General Appearance - Well Developed] : well developed [General Appearance - Well Nourished] : well nourished [Normal Appearance] : normal appearance [Well Groomed] : well groomed [General Appearance - In No Acute Distress] : no acute distress [Abdomen Mass (___ Cm)] : no abdominal mass palpated [Urinary Bladder Findings] : the bladder was normal on palpation [Edema] : no peripheral edema [Oriented To Time, Place, And Person] : oriented to person, place, and time [Affect] : the affect was normal [Mood] : the mood was normal [Not Anxious] : not anxious [Normal Station and Gait] : the gait and station were normal for the patient's age [No Focal Deficits] : no focal deficits

## 2019-07-25 NOTE — ASSESSMENT
[FreeTextEntry1] : Impression\par \par \par frequency\par hematuria\par history of bladder cancer\par \par Plan:\par \par urine cytology\par cysto\par hold on symptom treatment until cause identified.

## 2019-07-25 NOTE — LETTER BODY
[Dear  ___] : Dear  [unfilled], [Courtesy Letter:] : I had the pleasure of seeing your patient, [unfilled], in my office today. [Please see my note below.] : Please see my note below. [Sincerely,] : Sincerely, [FreeTextEntry3] : Ed\par \par Lacho Sandhu MD\par [facility]\par  of Urology\par [employer] at [facility]\par

## 2019-07-25 NOTE — HISTORY OF PRESENT ILLNESS
[FreeTextEntry1] : recently noted abrupt change in bladder symptoms.  no urgency.  no hematuria but she notes blood upon wiping. Went to  in [place]. Was given antibiotics and culture was apparently negative.  Has been taking toviaz. no urgency.  no leakage. no fevers or chills. no back pain.

## 2019-07-26 LAB — URINE CYTOLOGY: NORMAL

## 2019-07-30 ENCOUNTER — APPOINTMENT (OUTPATIENT)
Dept: UROLOGY | Facility: CLINIC | Age: 74
End: 2019-07-30
Payer: MEDICARE

## 2019-07-30 VITALS — SYSTOLIC BLOOD PRESSURE: 162 MMHG | DIASTOLIC BLOOD PRESSURE: 71 MMHG | HEART RATE: 65 BPM | RESPIRATION RATE: 13 BRPM

## 2019-07-30 LAB
BILIRUB UR QL STRIP: ABNORMAL
CLARITY UR: CLEAR
COLLECTION METHOD: NORMAL
GLUCOSE UR-MCNC: NORMAL
HCG UR QL: 0.2 EU/DL
HGB UR QL STRIP.AUTO: ABNORMAL
KETONES UR-MCNC: ABNORMAL
LEUKOCYTE ESTERASE UR QL STRIP: ABNORMAL
NITRITE UR QL STRIP: NORMAL
PH UR STRIP: 5.5
PROT UR STRIP-MCNC: ABNORMAL
SP GR UR STRIP: 1.02

## 2019-07-30 PROCEDURE — 52000 CYSTOURETHROSCOPY: CPT

## 2019-07-30 PROCEDURE — 81003 URINALYSIS AUTO W/O SCOPE: CPT | Mod: QW

## 2019-07-30 PROCEDURE — 99213 OFFICE O/P EST LOW 20 MIN: CPT | Mod: 25

## 2019-08-01 LAB
BACTERIA UR CULT: NORMAL
URINE CYTOLOGY: NORMAL

## 2019-08-02 ENCOUNTER — OUTPATIENT (OUTPATIENT)
Dept: OUTPATIENT SERVICES | Facility: HOSPITAL | Age: 74
LOS: 1 days | End: 2019-08-02
Payer: MEDICARE

## 2019-08-02 VITALS
TEMPERATURE: 97 F | HEART RATE: 57 BPM | SYSTOLIC BLOOD PRESSURE: 151 MMHG | DIASTOLIC BLOOD PRESSURE: 68 MMHG | RESPIRATION RATE: 20 BRPM | WEIGHT: 134.48 LBS | HEIGHT: 62.5 IN

## 2019-08-02 DIAGNOSIS — Z01.818 ENCOUNTER FOR OTHER PREPROCEDURAL EXAMINATION: ICD-10-CM

## 2019-08-02 DIAGNOSIS — D49.4 NEOPLASM OF UNSPECIFIED BEHAVIOR OF BLADDER: ICD-10-CM

## 2019-08-02 DIAGNOSIS — Z90.49 ACQUIRED ABSENCE OF OTHER SPECIFIED PARTS OF DIGESTIVE TRACT: Chronic | ICD-10-CM

## 2019-08-02 DIAGNOSIS — Z29.9 ENCOUNTER FOR PROPHYLACTIC MEASURES, UNSPECIFIED: ICD-10-CM

## 2019-08-02 DIAGNOSIS — I10 ESSENTIAL (PRIMARY) HYPERTENSION: ICD-10-CM

## 2019-08-02 DIAGNOSIS — C67.9 MALIGNANT NEOPLASM OF BLADDER, UNSPECIFIED: Chronic | ICD-10-CM

## 2019-08-02 DIAGNOSIS — Z98.890 OTHER SPECIFIED POSTPROCEDURAL STATES: Chronic | ICD-10-CM

## 2019-08-02 DIAGNOSIS — G56.00 CARPAL TUNNEL SYNDROME, UNSPECIFIED UPPER LIMB: Chronic | ICD-10-CM

## 2019-08-02 LAB
ANION GAP SERPL CALC-SCNC: 10 MMOL/L — SIGNIFICANT CHANGE UP (ref 5–17)
APTT BLD: 29.6 SEC — SIGNIFICANT CHANGE UP (ref 27.5–36.3)
BASOPHILS # BLD AUTO: 0.03 K/UL — SIGNIFICANT CHANGE UP (ref 0–0.2)
BASOPHILS NFR BLD AUTO: 0.6 % — SIGNIFICANT CHANGE UP (ref 0–2)
BLD GP AB SCN SERPL QL: SIGNIFICANT CHANGE UP
BUN SERPL-MCNC: 22 MG/DL — HIGH (ref 8–20)
CALCIUM SERPL-MCNC: 9.5 MG/DL — SIGNIFICANT CHANGE UP (ref 8.6–10.2)
CHLORIDE SERPL-SCNC: 104 MMOL/L — SIGNIFICANT CHANGE UP (ref 98–107)
CO2 SERPL-SCNC: 25 MMOL/L — SIGNIFICANT CHANGE UP (ref 22–29)
CREAT SERPL-MCNC: 0.91 MG/DL — SIGNIFICANT CHANGE UP (ref 0.5–1.3)
EOSINOPHIL # BLD AUTO: 0.18 K/UL — SIGNIFICANT CHANGE UP (ref 0–0.5)
EOSINOPHIL NFR BLD AUTO: 3.5 % — SIGNIFICANT CHANGE UP (ref 0–6)
GLUCOSE SERPL-MCNC: 107 MG/DL — SIGNIFICANT CHANGE UP (ref 70–115)
HCT VFR BLD CALC: 35.8 % — SIGNIFICANT CHANGE UP (ref 34.5–45)
HGB BLD-MCNC: 12.2 G/DL — SIGNIFICANT CHANGE UP (ref 11.5–15.5)
IMM GRANULOCYTES NFR BLD AUTO: 0.2 % — SIGNIFICANT CHANGE UP (ref 0–1.5)
INR BLD: 0.96 RATIO — SIGNIFICANT CHANGE UP (ref 0.88–1.16)
LYMPHOCYTES # BLD AUTO: 1.02 K/UL — SIGNIFICANT CHANGE UP (ref 1–3.3)
LYMPHOCYTES # BLD AUTO: 19.7 % — SIGNIFICANT CHANGE UP (ref 13–44)
MCHC RBC-ENTMCNC: 30.7 PG — SIGNIFICANT CHANGE UP (ref 27–34)
MCHC RBC-ENTMCNC: 34.1 GM/DL — SIGNIFICANT CHANGE UP (ref 32–36)
MCV RBC AUTO: 90.2 FL — SIGNIFICANT CHANGE UP (ref 80–100)
MONOCYTES # BLD AUTO: 0.42 K/UL — SIGNIFICANT CHANGE UP (ref 0–0.9)
MONOCYTES NFR BLD AUTO: 8.1 % — SIGNIFICANT CHANGE UP (ref 2–14)
NEUTROPHILS # BLD AUTO: 3.52 K/UL — SIGNIFICANT CHANGE UP (ref 1.8–7.4)
NEUTROPHILS NFR BLD AUTO: 67.9 % — SIGNIFICANT CHANGE UP (ref 43–77)
PLATELET # BLD AUTO: 186 K/UL — SIGNIFICANT CHANGE UP (ref 150–400)
POTASSIUM SERPL-MCNC: 4.8 MMOL/L — SIGNIFICANT CHANGE UP (ref 3.5–5.3)
POTASSIUM SERPL-SCNC: 4.8 MMOL/L — SIGNIFICANT CHANGE UP (ref 3.5–5.3)
PROTHROM AB SERPL-ACNC: 11 SEC — SIGNIFICANT CHANGE UP (ref 10–12.9)
RBC # BLD: 3.97 M/UL — SIGNIFICANT CHANGE UP (ref 3.8–5.2)
RBC # FLD: 11.7 % — SIGNIFICANT CHANGE UP (ref 10.3–14.5)
SODIUM SERPL-SCNC: 139 MMOL/L — SIGNIFICANT CHANGE UP (ref 135–145)
WBC # BLD: 5.18 K/UL — SIGNIFICANT CHANGE UP (ref 3.8–10.5)
WBC # FLD AUTO: 5.18 K/UL — SIGNIFICANT CHANGE UP (ref 3.8–10.5)

## 2019-08-02 PROCEDURE — 85027 COMPLETE CBC AUTOMATED: CPT

## 2019-08-02 PROCEDURE — 80048 BASIC METABOLIC PNL TOTAL CA: CPT

## 2019-08-02 PROCEDURE — 36415 COLL VENOUS BLD VENIPUNCTURE: CPT

## 2019-08-02 PROCEDURE — 86850 RBC ANTIBODY SCREEN: CPT

## 2019-08-02 PROCEDURE — 86900 BLOOD TYPING SEROLOGIC ABO: CPT

## 2019-08-02 PROCEDURE — 85610 PROTHROMBIN TIME: CPT

## 2019-08-02 PROCEDURE — 86901 BLOOD TYPING SEROLOGIC RH(D): CPT

## 2019-08-02 PROCEDURE — 93010 ELECTROCARDIOGRAM REPORT: CPT

## 2019-08-02 PROCEDURE — 93005 ELECTROCARDIOGRAM TRACING: CPT

## 2019-08-02 PROCEDURE — 85730 THROMBOPLASTIN TIME PARTIAL: CPT

## 2019-08-02 NOTE — H&P PST ADULT - NSICDXPASTSURGICALHX_GEN_ALL_CORE_FT
PAST SURGICAL HISTORY:  Carpal tunnel syndrome     History of bunionectomy     History of cryosurgery left finger    Recurrent malignant neoplasm of bladder     S/P appendectomy

## 2019-08-02 NOTE — ASU PATIENT PROFILE, ADULT - PMH
Bladder tumor    Bone tumor (benign)  left hand  HLD (hyperlipidemia)    HTN (hypertension)    OA (osteoarthritis)

## 2019-08-02 NOTE — H&P PST ADULT - NSICDXPASTMEDICALHX_GEN_ALL_CORE_FT
PAST MEDICAL HISTORY:  Bladder tumor     Bone tumor (benign) left hand    HLD (hyperlipidemia)     HTN (hypertension)     OA (osteoarthritis)

## 2019-08-02 NOTE — H&P PST ADULT - ASSESSMENT
73 year old female present with c/o gross hematuria.  Patient state work up revealed bladder tumor. She is now scheduled for trans urethral resection of bladder tumor, mitomycin instillation.  CAPRINI VTE 2.0 SCORE [CLOT updated 2019]    AGE RELATED RISK FACTORS                                                       MOBILITY RELATED FACTORS  [ ] Age 41-60 years                                            (1 Point)                    [ ] Bed rest                                                        (1 Point)  [ x] Age: 61-74 years                                           (2 Points)                  [ ] Plaster cast                                                   (2 Points)  [ ] Age= 75 years                                              (3 Points)                    [ ] Bed bound for more than 72 hours                 (2 Points)    DISEASE RELATED RISK FACTORS                                               GENDER SPECIFIC FACTORS  [ ] Edema in the lower extremities                       (1 Point)              [ ] Pregnancy                                                     (1 Point)  [ x] Varicose veins                                               (1 Point)                     [ ] Post-partum < 6 weeks                                   (1 Point)             [ x] BMI > 25 Kg/m2                                            (1 Point)                     [ ] Hormonal therapy  or oral contraception          (1 Point)                 [ ] Sepsis (in the previous month)                        (1 Point)               [ ] History of pregnancy complications                 (1 point)  [ ] Pneumonia or serious lung disease                                               [ ] Unexplained or recurrent                     (1 Point)           (in the previous month)                               (1 Point)  [ ] Abnormal pulmonary function test                     (1 Point)                 SURGERY RELATED RISK FACTORS  [ ] Acute myocardial infarction                              (1 Point)               [ ]  Section                                             (1 Point)  [ ] Congestive heart failure (in the previous month)  (1 Point)      [ ] Minor surgery                                                  (1 Point)   [ ] Inflammatory bowel disease                             (1 Point)               [ ] Arthroscopic surgery                                        (2 Points)  [ ] Central venous access                                      (2 Points)                [ x] General surgery lasting more than 45 minutes (2 points)  [x ] Malignancy- Present or previous                   (2 Points)                [ ] Elective arthroplasty                                         (5 points)    [ ] Stroke (in the previous month)                          (5 Points)                                                                                                                                                           HEMATOLOGY RELATED FACTORS                                                 TRAUMA RELATED RISK FACTORS  [ ] Prior episodes of VTE                                     (3 Points)                [ ] Fracture of the hip, pelvis, or leg                       (5 Points)  [ ] Positive family history for VTE                         (3 Points)             [ ] Acute spinal cord injury (in the previous month)  (5 Points)  [ ] Prothrombin 62270 A                                     (3 Points)               [ ] Paralysis  (less than 1 month)                             (5 Points)  [ ] Factor V Leiden                                             (3 Points)                  [ ] Multiple Trauma within 1 month                        (5 Points)  [ ] Lupus anticoagulants                                     (3 Points)                                                           [ ] Anticardiolipin antibodies                               (3 Points)                                                       [ ] High homocysteine in the blood                      (3 Points)                                             [ ] Other congenital or acquired thrombophilia      (3 Points)                                                [ ] Heparin induced thrombocytopenia                  (3 Points)                                     Total Score [   8   ]  OPIOID RISK TOOL    TRICIA EACH BOX THAT APPLIES AND ADD TOTALS AT THE END    FAMILY HISTORY OF SUBSTANCE ABUSE                 FEMALE         MALE                                                Alcohol                             [  ]1 pt          [  ]3pts                                               Illegal Durgs                     [  ]2 pts        [  ]3pts                                               Rx Drugs                           [  ]4 pts        [  ]4 pts    PERSONAL HISTORY OF SUBSTANCE ABUSE                                                                                          Alcohol                             [  ]3 pts       [  ]3 pts                                               Illegal Drugs                     [  ]4 pts        [  ]4 pts                                               Rx Drugs                           [  ]5 pts        [  ]5 pts    AGE BETWEEN 16-45 YEARS                                      [  ]1 pt         [  ]1 pt    HISTORY OF PREADOLESCENT   SEXUAL ABUSE                                                             [  ]3 pts        [  ]0pts    PSYCHOLOGICAL DISEASE                     ADD, OCD, Bipolar, Schizophrenia        [  ]2 pts         [  ]2 pts                      Depression                                               [  ]1 pt           [  ]1 pt           SCORING TOTAL   (add numbers and type here)              (***)                                     A score of 3 or lower indicated LOW risk for future opioid abuse  A score of 4 to 7 indicated moderate risk for future opioid abuse  A score of 8 or higher indicates a high risk for opioid abuse 73 year old with h/o former smoker, HTN, bladder tumor s/p sx present with c/o increased urine frequency and discomfort.  a recent cytoscopy that revealed reoccurrence of bladder tumors.  She is now scheduled for trans urethral resection of bladder tumor.    SAMI VTE 2.0 SCORE [CLOT updated 2019]    AGE RELATED RISK FACTORS                                                       MOBILITY RELATED FACTORS  [ ] Age 41-60 years                                            (1 Point)                    [ ] Bed rest                                                        (1 Point)  [ x] Age: 61-74 years                                           (2 Points)                  [ ] Plaster cast                                                   (2 Points)  [ ] Age= 75 years                                              (3 Points)                    [ ] Bed bound for more than 72 hours                 (2 Points)    DISEASE RELATED RISK FACTORS                                               GENDER SPECIFIC FACTORS  [ ] Edema in the lower extremities                       (1 Point)              [ ] Pregnancy                                                     (1 Point)  [ x] Varicose veins                                               (1 Point)                     [ ] Post-partum < 6 weeks                                   (1 Point)             [ x] BMI > 25 Kg/m2                                            (1 Point)                     [ ] Hormonal therapy  or oral contraception          (1 Point)                 [ ] Sepsis (in the previous month)                        (1 Point)               [ ] History of pregnancy complications                 (1 point)  [ ] Pneumonia or serious lung disease                                               [ ] Unexplained or recurrent                     (1 Point)           (in the previous month)                               (1 Point)  [ ] Abnormal pulmonary function test                     (1 Point)                 SURGERY RELATED RISK FACTORS  [ ] Acute myocardial infarction                              (1 Point)               [ ]  Section                                             (1 Point)  [ ] Congestive heart failure (in the previous month)  (1 Point)      [ ] Minor surgery                                                  (1 Point)   [ ] Inflammatory bowel disease                             (1 Point)               [ ] Arthroscopic surgery                                        (2 Points)  [ ] Central venous access                                      (2 Points)                [ x] General surgery lasting more than 45 minutes (2 points)  [x ] Malignancy- Present or previous                   (2 Points)                [ ] Elective arthroplasty                                         (5 points)    [ ] Stroke (in the previous month)                          (5 Points)                                                                                                                                                           HEMATOLOGY RELATED FACTORS                                                 TRAUMA RELATED RISK FACTORS  [ ] Prior episodes of VTE                                     (3 Points)                [ ] Fracture of the hip, pelvis, or leg                       (5 Points)  [ ] Positive family history for VTE                         (3 Points)             [ ] Acute spinal cord injury (in the previous month)  (5 Points)  [ ] Prothrombin 31193 A                                     (3 Points)               [ ] Paralysis  (less than 1 month)                             (5 Points)  [ ] Factor V Leiden                                             (3 Points)                  [ ] Multiple Trauma within 1 month                        (5 Points)  [ ] Lupus anticoagulants                                     (3 Points)                                                           [ ] Anticardiolipin antibodies                               (3 Points)                                                       [ ] High homocysteine in the blood                      (3 Points)                                             [ ] Other congenital or acquired thrombophilia      (3 Points)                                                [ ] Heparin induced thrombocytopenia                  (3 Points)                                     Total Score [   8   ]  OPIOID RISK TOOL    TRICIA EACH BOX THAT APPLIES AND ADD TOTALS AT THE END    FAMILY HISTORY OF SUBSTANCE ABUSE                 FEMALE         MALE                                                Alcohol                             [  ]1 pt          [  ]3pts                                               Illegal Durgs                     [  ]2 pts        [  ]3pts                                               Rx Drugs                           [  ]4 pts        [  ]4 pts    PERSONAL HISTORY OF SUBSTANCE ABUSE                                                                                          Alcohol                             [  ]3 pts       [  ]3 pts                                               Illegal Drugs                     [  ]4 pts        [  ]4 pts                                               Rx Drugs                           [  ]5 pts        [  ]5 pts    AGE BETWEEN 16-45 YEARS                                      [  ]1 pt         [  ]1 pt    HISTORY OF PREADOLESCENT   SEXUAL ABUSE                                                             [  ]3 pts        [  ]0pts    PSYCHOLOGICAL DISEASE                     ADD, OCD, Bipolar, Schizophrenia        [  ]2 pts         [  ]2 pts                      Depression                                               [  ]1 pt           [  ]1 pt           SCORING TOTAL   (add numbers and type here)              (***)                                     A score of 3 or lower indicated LOW risk for future opioid abuse  A score of 4 to 7 indicated moderate risk for future opioid abuse  A score of 8 or higher indicates a high risk for opioid abuse

## 2019-08-02 NOTE — H&P PST ADULT - LAB RESULTS AND INTERPRETATION
pt refused to repeat UA and urine cx state she had test done in [facility] office and was negative

## 2019-08-02 NOTE — H&P PST ADULT - HISTORY OF PRESENT ILLNESS
73 year old female present with c/o gross hematuria.  Patient state work up revealed bladder tumor. She is now scheduled for trans urethral resection of bladder tumor, mitomycin instillation. 73 year old with h/o former smoker, HTN, bladder tumor s/p sx present with c/o increased urine frequency and discomfort. Patient state she went to the urologist and had a recent cytoscopy that revealed reoccurrence of bladder tumors. She also had a ua and urine cx and states no UTI  . She is now scheduled for trans urethral resection of bladder tumor.

## 2019-08-02 NOTE — ASU PATIENT PROFILE, ADULT - PSH
Carpal tunnel syndrome    History of bunionectomy    History of cryosurgery  left finger  Recurrent malignant neoplasm of bladder    S/P appendectomy

## 2019-08-02 NOTE — H&P PST ADULT - NSICDXPROBLEM_GEN_ALL_CORE_FT
PROBLEM DIAGNOSES  Problem: Need for prophylactic measure  Assessment and Plan: high risk, the surgical team will evaluate for appropriate VTE prophylaxis     Problem: HTN (hypertension)  Assessment and Plan: routine labs and ekg  Medical clearance     Problem: Bladder tumor  Assessment and Plan:  trans urethral resection of bladder tumor, mitomycin instillation. PROBLEM DIAGNOSES  Problem: Need for prophylactic measure  Assessment and Plan: high risk for VTE event, the surgical team will evaluate for appropriate VTE prophylaxis     Problem: HTN (hypertension)  Assessment and Plan: routine labs and ekg  medical clearance      Problem: Bladder tumor  Assessment and Plan: trans urethral resection of bladder tumor.

## 2019-08-05 ENCOUNTER — APPOINTMENT (OUTPATIENT)
Dept: INTERNAL MEDICINE | Facility: CLINIC | Age: 74
End: 2019-08-05
Payer: MEDICARE

## 2019-08-05 VITALS
HEIGHT: 62 IN | DIASTOLIC BLOOD PRESSURE: 82 MMHG | BODY MASS INDEX: 24.91 KG/M2 | WEIGHT: 135.38 LBS | SYSTOLIC BLOOD PRESSURE: 162 MMHG

## 2019-08-05 PROCEDURE — 99358 PROLONG SERVICE W/O CONTACT: CPT

## 2019-08-05 PROCEDURE — 99214 OFFICE O/P EST MOD 30 MIN: CPT | Mod: 25

## 2019-08-05 NOTE — HISTORY OF PRESENT ILLNESS
[No Pertinent Pulmonary History] : no history of asthma, COPD, sleep apnea, or smoking [No Pertinent Cardiac History] : no history of aortic stenosis, atrial fibrillation, coronary artery disease, recent myocardial infarction, or implantable device/pacemaker [No Adverse Anesthesia Reaction] : no adverse anesthesia reaction in self or family member [Chronic Kidney Disease] : no chronic kidney disease [Chronic Anticoagulation] : no chronic anticoagulation [Diabetes] : no diabetes [(Patient denies any chest pain, claudication, dyspnea on exertion, orthopnea, palpitations or syncope)] : Patient denies any chest pain, claudication, dyspnea on exertion, orthopnea, palpitations or syncope [Moderate (4-6 METs)] : Moderate (4-6 METs) [FreeTextEntry1] : Transurethral Restriction of Bladder Tumor  [FreeTextEntry2] : 08/07/19 [FreeTextEntry3] : Dr. Sandhu [FreeTextEntry4] : Ms. NINA DUTTA is a 73 year female with a PMH of Bladder cancer comes to the office for preoperative evaluation.  Patient feels well and has no complaints at this time.

## 2019-08-05 NOTE — PHYSICAL EXAM
[No Acute Distress] : no acute distress [Well Nourished] : well nourished [Well Developed] : well developed [Well-Appearing] : well-appearing [Normal Sclera/Conjunctiva] : normal sclera/conjunctiva [PERRL] : pupils equal round and reactive to light [Normal Outer Ear/Nose] : the outer ears and nose were normal in appearance [EOMI] : extraocular movements intact [Normal Oropharynx] : the oropharynx was normal [No JVD] : no jugular venous distention [No Lymphadenopathy] : no lymphadenopathy [Supple] : supple [Thyroid Normal, No Nodules] : the thyroid was normal and there were no nodules present [No Respiratory Distress] : no respiratory distress  [No Accessory Muscle Use] : no accessory muscle use [Clear to Auscultation] : lungs were clear to auscultation bilaterally [Regular Rhythm] : with a regular rhythm [Normal Rate] : normal rate  [No Murmur] : no murmur heard [Normal S1, S2] : normal S1 and S2 [No Abdominal Bruit] : a ~M bruit was not heard ~T in the abdomen [No Carotid Bruits] : no carotid bruits [No Edema] : there was no peripheral edema [Pedal Pulses Present] : the pedal pulses are present [No Varicosities] : no varicosities [No Extremity Clubbing/Cyanosis] : no extremity clubbing/cyanosis [No Palpable Aorta] : no palpable aorta [Non Tender] : non-tender [Soft] : abdomen soft [Non-distended] : non-distended [No Masses] : no abdominal mass palpated [Normal Bowel Sounds] : normal bowel sounds [No HSM] : no HSM [Normal Posterior Cervical Nodes] : no posterior cervical lymphadenopathy [Normal Anterior Cervical Nodes] : no anterior cervical lymphadenopathy [No Spinal Tenderness] : no spinal tenderness [No CVA Tenderness] : no CVA  tenderness [No Joint Swelling] : no joint swelling [Grossly Normal Strength/Tone] : grossly normal strength/tone [No Rash] : no rash [Coordination Grossly Intact] : coordination grossly intact [No Focal Deficits] : no focal deficits [Normal Gait] : normal gait [Normal Affect] : the affect was normal [Normal Insight/Judgement] : insight and judgment were intact

## 2019-08-05 NOTE — PLAN
[FreeTextEntry1] : Ms. NINA DUTTA is a 73 year female with a PMH of Bladder cancer comes to the office for preoperative evaluation.  Patient feels well and has no complaints at this time.  Patient has greater than 4 METS, is a low perioperative risk for Major adverse cardiac event. ECG and blood work reviewed. No further testing indicated at this time. Patient is medically optimized for this procedure. \par \par Prior to patient's arrival, extensive medical records were reviewed including but not limited to, Hospital records records, out patient records, laboratory data and microbiology data \par In addition extensive time was also spent in reviewing diagnostic studies.\par \par The duration of the review took 35 minutes \par \par Counseling included abnormal lab results, differential diagnoses, treatment options, risks and benefits, lifestyle changes, prognosis, current condition, medications, and dose adjustments. \par The patient was interactive, attentive, asked questions, and verbalized understanding

## 2019-08-05 NOTE — ASSESSMENT
[Patient Optimized for Surgery] : Patient optimized for surgery [No Further Testing Recommended] : no further testing recommended [As per surgery] : as per surgery [FreeTextEntry6] : stop Aspirin 7 days prior to procedure [FreeTextEntry4] : Ms. NINA DUTTA is a 73 year female with a PMH of Bladder cancer comes to the office for preoperative evaluation.  Patient feels well and has no complaints at this time.  Patient has greater than 4 METS, is a low perioperative risk for Major adverse cardiac event. ECG and blood work reviewed. No further testing indicated at this time. Patient is medically optimized for this procedure.

## 2019-08-06 ENCOUNTER — TRANSCRIPTION ENCOUNTER (OUTPATIENT)
Age: 74
End: 2019-08-06

## 2019-08-07 ENCOUNTER — RESULT REVIEW (OUTPATIENT)
Age: 74
End: 2019-08-07

## 2019-08-07 ENCOUNTER — APPOINTMENT (OUTPATIENT)
Dept: UROLOGY | Facility: HOSPITAL | Age: 74
End: 2019-08-07

## 2019-08-07 ENCOUNTER — OUTPATIENT (OUTPATIENT)
Dept: INPATIENT UNIT | Facility: HOSPITAL | Age: 74
LOS: 1 days | End: 2019-08-07
Payer: MEDICARE

## 2019-08-07 VITALS
TEMPERATURE: 98 F | DIASTOLIC BLOOD PRESSURE: 77 MMHG | WEIGHT: 134.48 LBS | RESPIRATION RATE: 16 BRPM | HEART RATE: 63 BPM | HEIGHT: 62.5 IN | OXYGEN SATURATION: 98 % | SYSTOLIC BLOOD PRESSURE: 157 MMHG

## 2019-08-07 VITALS
RESPIRATION RATE: 14 BRPM | OXYGEN SATURATION: 97 % | HEART RATE: 67 BPM | DIASTOLIC BLOOD PRESSURE: 63 MMHG | SYSTOLIC BLOOD PRESSURE: 169 MMHG

## 2019-08-07 DIAGNOSIS — Z90.49 ACQUIRED ABSENCE OF OTHER SPECIFIED PARTS OF DIGESTIVE TRACT: Chronic | ICD-10-CM

## 2019-08-07 DIAGNOSIS — D49.4 NEOPLASM OF UNSPECIFIED BEHAVIOR OF BLADDER: ICD-10-CM

## 2019-08-07 DIAGNOSIS — Z98.890 OTHER SPECIFIED POSTPROCEDURAL STATES: Chronic | ICD-10-CM

## 2019-08-07 DIAGNOSIS — C67.9 MALIGNANT NEOPLASM OF BLADDER, UNSPECIFIED: Chronic | ICD-10-CM

## 2019-08-07 DIAGNOSIS — G56.00 CARPAL TUNNEL SYNDROME, UNSPECIFIED UPPER LIMB: Chronic | ICD-10-CM

## 2019-08-07 DIAGNOSIS — Z01.818 ENCOUNTER FOR OTHER PREPROCEDURAL EXAMINATION: ICD-10-CM

## 2019-08-07 LAB — BLD GP AB SCN SERPL QL: SIGNIFICANT CHANGE UP

## 2019-08-07 PROCEDURE — 88112 CYTOPATH CELL ENHANCE TECH: CPT | Mod: 26

## 2019-08-07 PROCEDURE — 52235 CYSTOSCOPY AND TREATMENT: CPT

## 2019-08-07 PROCEDURE — 86850 RBC ANTIBODY SCREEN: CPT

## 2019-08-07 PROCEDURE — 88307 TISSUE EXAM BY PATHOLOGIST: CPT

## 2019-08-07 PROCEDURE — 36415 COLL VENOUS BLD VENIPUNCTURE: CPT

## 2019-08-07 PROCEDURE — 86901 BLOOD TYPING SEROLOGIC RH(D): CPT

## 2019-08-07 PROCEDURE — 88112 CYTOPATH CELL ENHANCE TECH: CPT

## 2019-08-07 PROCEDURE — 86900 BLOOD TYPING SEROLOGIC ABO: CPT

## 2019-08-07 PROCEDURE — 88307 TISSUE EXAM BY PATHOLOGIST: CPT | Mod: 26

## 2019-08-07 RX ORDER — AZTREONAM 2 G
1 VIAL (EA) INJECTION
Qty: 10 | Refills: 0
Start: 2019-08-07

## 2019-08-07 RX ORDER — SODIUM CHLORIDE 9 MG/ML
3 INJECTION INTRAMUSCULAR; INTRAVENOUS; SUBCUTANEOUS ONCE
Refills: 0 | Status: DISCONTINUED | OUTPATIENT
Start: 2019-08-07 | End: 2019-08-07

## 2019-08-07 RX ORDER — FLUTICASONE PROPIONATE 50 MCG
1 SPRAY, SUSPENSION NASAL
Qty: 0 | Refills: 0 | DISCHARGE

## 2019-08-07 RX ORDER — PHENAZOPYRIDINE HCL 100 MG
2 TABLET ORAL
Qty: 0 | Refills: 0 | DISCHARGE

## 2019-08-07 RX ORDER — OXYCODONE AND ACETAMINOPHEN 5; 325 MG/1; MG/1
1 TABLET ORAL ONCE
Refills: 0 | Status: DISCONTINUED | OUTPATIENT
Start: 2019-08-07 | End: 2019-08-08

## 2019-08-07 RX ORDER — METOPROLOL TARTRATE 50 MG
0.5 TABLET ORAL
Qty: 0 | Refills: 0 | DISCHARGE

## 2019-08-07 RX ORDER — FESOTERODINE FUMARATE 8 MG/1
1 TABLET, FILM COATED, EXTENDED RELEASE ORAL
Qty: 0 | Refills: 0 | DISCHARGE

## 2019-08-07 RX ORDER — SODIUM CHLORIDE 9 MG/ML
1000 INJECTION, SOLUTION INTRAVENOUS
Refills: 0 | Status: DISCONTINUED | OUTPATIENT
Start: 2019-08-07 | End: 2019-08-30

## 2019-08-07 RX ORDER — SODIUM CHLORIDE 9 MG/ML
1000 INJECTION, SOLUTION INTRAVENOUS
Refills: 0 | Status: DISCONTINUED | OUTPATIENT
Start: 2019-08-07 | End: 2019-08-08

## 2019-08-07 RX ORDER — SPIRONOLACTONE 25 MG/1
3 TABLET, FILM COATED ORAL
Qty: 0 | Refills: 0 | DISCHARGE

## 2019-08-07 RX ORDER — CEFAZOLIN SODIUM 1 G
2000 VIAL (EA) INJECTION ONCE
Refills: 0 | Status: DISCONTINUED | OUTPATIENT
Start: 2019-08-07 | End: 2019-08-30

## 2019-08-07 RX ORDER — FENTANYL CITRATE 50 UG/ML
25 INJECTION INTRAVENOUS
Refills: 0 | Status: DISCONTINUED | OUTPATIENT
Start: 2019-08-07 | End: 2019-08-08

## 2019-08-07 RX ORDER — FENTANYL CITRATE 50 UG/ML
50 INJECTION INTRAVENOUS
Refills: 0 | Status: DISCONTINUED | OUTPATIENT
Start: 2019-08-07 | End: 2019-08-07

## 2019-08-07 RX ADMIN — FENTANYL CITRATE 50 MICROGRAM(S): 50 INJECTION INTRAVENOUS at 22:06

## 2019-08-07 RX ADMIN — FENTANYL CITRATE 50 MICROGRAM(S): 50 INJECTION INTRAVENOUS at 22:26

## 2019-08-07 RX ADMIN — SODIUM CHLORIDE 75 MILLILITER(S): 9 INJECTION, SOLUTION INTRAVENOUS at 22:07

## 2019-08-07 RX ADMIN — SODIUM CHLORIDE 75 MILLILITER(S): 9 INJECTION, SOLUTION INTRAVENOUS at 22:16

## 2019-08-07 RX ADMIN — FENTANYL CITRATE 50 MICROGRAM(S): 50 INJECTION INTRAVENOUS at 22:17

## 2019-08-07 NOTE — ASU DISCHARGE PLAN (ADULT/PEDIATRIC) - CARE PROVIDER_API CALL
Lacho Sandhu)  Urology  [address], [address]  [place] [address]  Phone: [phone]  Fax: [phone]  Follow Up Time: 1-3 days

## 2019-08-07 NOTE — ASU DISCHARGE PLAN (ADULT/PEDIATRIC) - CALL YOUR DOCTOR IF YOU HAVE ANY OF THE FOLLOWING:
101F/Fever greater than (need to indicate Fahrenheit or Celsius)/Bleeding that does not stop/Pain not relieved by Medications

## 2019-08-07 NOTE — ASU DISCHARGE PLAN (ADULT/PEDIATRIC) - ASU DC SPECIAL INSTRUCTIONSFT
Alford to be removed on Friday 8/9    Keep first meal light. Nothing fried, spicy or greasy. Increase fluids.

## 2019-08-07 NOTE — BRIEF OPERATIVE NOTE - NSICDXBRIEFPROCEDURE_GEN_ALL_CORE_FT
PROCEDURES:  Cystourethroscopy with bladder tumor resection, medium 07-Aug-2019 21:50:26  Lacho Sandhu

## 2019-08-08 PROBLEM — D49.4 NEOPLASM OF UNSPECIFIED BEHAVIOR OF BLADDER: Chronic | Status: ACTIVE | Noted: 2019-08-02

## 2019-08-10 LAB — NON-GYNECOLOGICAL CYTOLOGY STUDY: SIGNIFICANT CHANGE UP

## 2019-08-15 LAB — SURGICAL PATHOLOGY STUDY: SIGNIFICANT CHANGE UP

## 2019-08-20 ENCOUNTER — APPOINTMENT (OUTPATIENT)
Dept: UROLOGY | Facility: CLINIC | Age: 74
End: 2019-08-20
Payer: MEDICARE

## 2019-08-20 VITALS — HEART RATE: 72 BPM | SYSTOLIC BLOOD PRESSURE: 110 MMHG | DIASTOLIC BLOOD PRESSURE: 56 MMHG

## 2019-08-20 PROCEDURE — 99214 OFFICE O/P EST MOD 30 MIN: CPT

## 2019-08-20 RX ORDER — SPIRONOLACTONE 25 MG/1
25 TABLET ORAL DAILY
Refills: 0 | Status: DISCONTINUED | COMMUNITY
End: 2019-08-20

## 2019-08-20 NOTE — ASSESSMENT
[FreeTextEntry1] : Impression:\par \par CIS bladder \par \par Plan"\par \par BCG X 6 weeks.\par cysto per protocol.

## 2019-08-20 NOTE — PHYSICAL EXAM
[General Appearance - Well Developed] : well developed [General Appearance - Well Nourished] : well nourished [Normal Appearance] : normal appearance [Well Groomed] : well groomed [General Appearance - In No Acute Distress] : no acute distress [Edema] : no peripheral edema [] : no respiratory distress [Exaggerated Use Of Accessory Muscles For Inspiration] : no accessory muscle use [Respiration, Rhythm And Depth] : normal respiratory rhythm and effort [Oriented To Time, Place, And Person] : oriented to person, place, and time [Affect] : the affect was normal [Mood] : the mood was normal [Not Anxious] : not anxious [Normal Station and Gait] : the gait and station were normal for the patient's age [No Focal Deficits] : no focal deficits

## 2019-08-20 NOTE — LETTER BODY
[Dear  ___] : Dear  [unfilled], [Please see my note below.] : Please see my note below. [Courtesy Letter:] : I had the pleasure of seeing your patient, [unfilled], in my office today. [Sincerely,] : Sincerely, [FreeTextEntry3] : Ed\par \par Lacho Sandhu MD\par [facility]\par  of Urology\par [employer] [facility]\par

## 2019-08-20 NOTE — HISTORY OF PRESENT ILLNESS
[FreeTextEntry1] : patient presents with post op follow up.  Urine clear.  She denies hematuria.  sometimes feels as if she does not feel empty after voiding.  no cramping or belly pain.

## 2019-08-26 ENCOUNTER — APPOINTMENT (OUTPATIENT)
Dept: UROLOGY | Facility: CLINIC | Age: 74
End: 2019-08-26

## 2019-09-03 ENCOUNTER — APPOINTMENT (OUTPATIENT)
Dept: UROLOGY | Facility: CLINIC | Age: 74
End: 2019-09-03
Payer: MEDICARE

## 2019-09-03 VITALS
DIASTOLIC BLOOD PRESSURE: 70 MMHG | HEIGHT: 62 IN | SYSTOLIC BLOOD PRESSURE: 168 MMHG | WEIGHT: 135 LBS | HEART RATE: 66 BPM | BODY MASS INDEX: 24.84 KG/M2

## 2019-09-03 PROCEDURE — 51720 TREATMENT OF BLADDER LESION: CPT

## 2019-09-03 RX ORDER — BACILLUS CALMETTE-GUERIN 50 MG/50ML
50 POWDER, FOR SUSPENSION INTRAVESICAL
Qty: 0 | Refills: 0 | Status: COMPLETED | OUTPATIENT
Start: 2019-09-03

## 2019-09-03 RX ORDER — BACILLUS CALMETTE-GUERIN 50 MG/50ML
50 POWDER, FOR SUSPENSION INTRAVESICAL
Qty: 1 | Refills: 0 | Status: COMPLETED | COMMUNITY
Start: 2019-09-03 | End: 2019-09-03

## 2019-09-03 RX ADMIN — BACILLUS CALMETTE-GUERIN 0 MG: 50 POWDER, FOR SUSPENSION INTRAVESICAL at 00:00

## 2019-09-09 ENCOUNTER — APPOINTMENT (OUTPATIENT)
Dept: UROLOGY | Facility: CLINIC | Age: 74
End: 2019-09-09
Payer: MEDICARE

## 2019-09-09 LAB
BILIRUB UR QL STRIP: NORMAL
GLUCOSE UR-MCNC: NORMAL
HCG UR QL: 0.2 EU/DL
HGB UR QL STRIP.AUTO: NORMAL
KETONES UR-MCNC: NORMAL
LEUKOCYTE ESTERASE UR QL STRIP: NORMAL
NITRITE UR QL STRIP: POSITIVE
PH UR STRIP: 5.5
PROT UR STRIP-MCNC: 100
SP GR UR STRIP: 1.03

## 2019-09-09 PROCEDURE — 99212 OFFICE O/P EST SF 10 MIN: CPT | Mod: 25

## 2019-09-09 PROCEDURE — 81003 URINALYSIS AUTO W/O SCOPE: CPT | Mod: QW

## 2019-09-09 NOTE — PHYSICAL EXAM
[General Appearance - Well Developed] : well developed [General Appearance - In No Acute Distress] : no acute distress [Skin Color & Pigmentation] : normal skin color and pigmentation [] : no respiratory distress [Oriented To Time, Place, And Person] : oriented to person, place, and time [Affect] : the affect was normal [Mood] : the mood was normal [Not Anxious] : not anxious [Normal Station and Gait] : the gait and station were normal for the patient's age

## 2019-09-09 NOTE — HISTORY OF PRESENT ILLNESS
[Urinary Urgency] : urinary urgency [Urinary Frequency] : urinary frequency [FreeTextEntry1] : Patient with U/A + nitrite, large blood, small leuk. Will postpone BCG #2/6 today. Pt reports frequency, occasional urgency.no frequency or fevers or chills. [Dysuria] : no dysuria [Hematuria - Gross] : no gross hematuria

## 2019-09-09 NOTE — ASSESSMENT
[FreeTextEntry1] : UTI\par Will send urine culture\par Instructed on Cipro x 5 days (Allergic Keflex, Bactrim)\par

## 2019-09-11 LAB — BACTERIA UR CULT: NORMAL

## 2019-09-16 ENCOUNTER — APPOINTMENT (OUTPATIENT)
Dept: UROLOGY | Facility: CLINIC | Age: 74
End: 2019-09-16
Payer: MEDICARE

## 2019-09-16 PROCEDURE — 81003 URINALYSIS AUTO W/O SCOPE: CPT | Mod: QW

## 2019-09-16 PROCEDURE — 51720 TREATMENT OF BLADDER LESION: CPT

## 2019-09-16 RX ORDER — BACILLUS CALMETTE-GUERIN 50 MG/50ML
50 POWDER, FOR SUSPENSION INTRAVESICAL
Qty: 0 | Refills: 0 | Status: COMPLETED | OUTPATIENT
Start: 2019-09-16

## 2019-09-16 RX ORDER — BACILLUS CALMETTE-GUERIN 50 MG/50ML
50 POWDER, FOR SUSPENSION INTRAVESICAL
Qty: 1 | Refills: 0 | Status: COMPLETED | COMMUNITY
Start: 2019-09-16 | End: 2019-09-16

## 2019-09-16 RX ADMIN — BACILLUS CALMETTE-GUERIN 0 MG: 50 POWDER, FOR SUSPENSION INTRAVESICAL at 00:00

## 2019-09-17 ENCOUNTER — RESULT REVIEW (OUTPATIENT)
Age: 74
End: 2019-09-17

## 2019-09-17 LAB
BILIRUB UR QL STRIP: NORMAL
GLUCOSE UR-MCNC: NORMAL
HCG UR QL: 0.2 EU/DL
HGB UR QL STRIP.AUTO: NORMAL
KETONES UR-MCNC: NORMAL
LEUKOCYTE ESTERASE UR QL STRIP: NORMAL
NITRITE UR QL STRIP: NORMAL
PH UR STRIP: 6
PROT UR STRIP-MCNC: NORMAL
SP GR UR STRIP: NORMAL

## 2019-09-23 ENCOUNTER — APPOINTMENT (OUTPATIENT)
Dept: UROLOGY | Facility: CLINIC | Age: 74
End: 2019-09-23
Payer: MEDICARE

## 2019-09-23 PROCEDURE — 81003 URINALYSIS AUTO W/O SCOPE: CPT | Mod: QW

## 2019-09-23 PROCEDURE — 51720 TREATMENT OF BLADDER LESION: CPT

## 2019-09-23 RX ORDER — BACILLUS CALMETTE-GUERIN 50 MG/50ML
50 POWDER, FOR SUSPENSION INTRAVESICAL
Qty: 1 | Refills: 0 | Status: COMPLETED | COMMUNITY
Start: 2019-09-23 | End: 2019-09-23

## 2019-09-23 RX ORDER — BACILLUS CALMETTE-GUERIN 50 MG/50ML
50 POWDER, FOR SUSPENSION INTRAVESICAL
Qty: 0 | Refills: 0 | Status: COMPLETED | OUTPATIENT
Start: 2019-09-23

## 2019-09-23 RX ADMIN — Medication 0 MG: at 00:00

## 2019-09-24 ENCOUNTER — RESULT REVIEW (OUTPATIENT)
Age: 74
End: 2019-09-24

## 2019-09-24 LAB
BILIRUB UR QL STRIP: NORMAL
GLUCOSE UR-MCNC: NORMAL
HCG UR QL: 0.2 EU/DL
HGB UR QL STRIP.AUTO: NORMAL
KETONES UR-MCNC: NORMAL
LEUKOCYTE ESTERASE UR QL STRIP: NORMAL
NITRITE UR QL STRIP: NORMAL
PH UR STRIP: 6
PROT UR STRIP-MCNC: NORMAL
SP GR UR STRIP: 1.01

## 2019-09-30 ENCOUNTER — RESULT CHARGE (OUTPATIENT)
Age: 74
End: 2019-09-30

## 2019-10-01 ENCOUNTER — APPOINTMENT (OUTPATIENT)
Dept: UROLOGY | Facility: CLINIC | Age: 74
End: 2019-10-01
Payer: MEDICARE

## 2019-10-01 PROCEDURE — 99211 OFF/OP EST MAY X REQ PHY/QHP: CPT

## 2019-10-02 ENCOUNTER — MOBILE ON CALL (OUTPATIENT)
Age: 74
End: 2019-10-02

## 2019-10-03 LAB — BACTERIA UR CULT: NORMAL

## 2019-10-07 ENCOUNTER — RESULT CHARGE (OUTPATIENT)
Age: 74
End: 2019-10-07

## 2019-10-07 ENCOUNTER — APPOINTMENT (OUTPATIENT)
Dept: UROLOGY | Facility: CLINIC | Age: 74
End: 2019-10-07
Payer: MEDICARE

## 2019-10-07 VITALS — HEART RATE: 73 BPM | SYSTOLIC BLOOD PRESSURE: 121 MMHG | DIASTOLIC BLOOD PRESSURE: 63 MMHG

## 2019-10-07 LAB
BILIRUB UR QL STRIP: NORMAL
CLARITY UR: NORMAL
COLLECTION METHOD: NORMAL
GLUCOSE UR-MCNC: NORMAL
HCG UR QL: 0.2 EU/DL
HGB UR QL STRIP.AUTO: NORMAL
KETONES UR-MCNC: NORMAL
LEUKOCYTE ESTERASE UR QL STRIP: NORMAL
NITRITE UR QL STRIP: NORMAL
PH UR STRIP: 5.5
PROT UR STRIP-MCNC: 100
SP GR UR STRIP: 1.02

## 2019-10-07 PROCEDURE — 51720 TREATMENT OF BLADDER LESION: CPT

## 2019-10-07 RX ORDER — BACILLUS CALMETTE-GUERIN 50 MG/50ML
50 POWDER, FOR SUSPENSION INTRAVESICAL
Refills: 0 | Status: COMPLETED | COMMUNITY
Start: 2019-10-07 | End: 2019-10-07

## 2019-10-07 RX ORDER — BACILLUS CALMETTE-GUERIN 50 MG/50ML
50 POWDER, FOR SUSPENSION INTRAVESICAL
Qty: 0 | Refills: 0 | Status: COMPLETED | OUTPATIENT
Start: 2019-10-07

## 2019-10-07 RX ADMIN — BACILLUS CALMETTE-GUERIN 0 MG: 50 POWDER, FOR SUSPENSION INTRAVESICAL at 00:00

## 2019-10-11 ENCOUNTER — APPOINTMENT (OUTPATIENT)
Dept: INTERNAL MEDICINE | Facility: CLINIC | Age: 74
End: 2019-10-11
Payer: MEDICARE

## 2019-10-11 ENCOUNTER — RX RENEWAL (OUTPATIENT)
Age: 74
End: 2019-10-11

## 2019-10-11 ENCOUNTER — LABORATORY RESULT (OUTPATIENT)
Age: 74
End: 2019-10-11

## 2019-10-11 VITALS
WEIGHT: 135 LBS | DIASTOLIC BLOOD PRESSURE: 80 MMHG | SYSTOLIC BLOOD PRESSURE: 150 MMHG | HEIGHT: 62 IN | BODY MASS INDEX: 24.84 KG/M2

## 2019-10-11 DIAGNOSIS — K21.9 GASTRO-ESOPHAGEAL REFLUX DISEASE W/OUT ESOPHAGITIS: ICD-10-CM

## 2019-10-11 PROCEDURE — G0439: CPT

## 2019-10-11 PROCEDURE — 99214 OFFICE O/P EST MOD 30 MIN: CPT | Mod: 25

## 2019-10-11 PROCEDURE — 36415 COLL VENOUS BLD VENIPUNCTURE: CPT

## 2019-10-12 VITALS — SYSTOLIC BLOOD PRESSURE: 140 MMHG | DIASTOLIC BLOOD PRESSURE: 80 MMHG

## 2019-10-12 NOTE — HISTORY OF PRESENT ILLNESS
[FreeTextEntry1] : ANNUAL EXAM [de-identified] : PT HERE FOR ANNUAL EXAM FEELS OK \par HAS STOPPED ALDACTONE AND NOW INCREASED  HER METOPROLOL TO 50  NO CP NO SOB \par OVERALL FEELS OK BU T  HA SBEEN UNDER A LOT OF STRESS DIAGNOSED WITH BLADDER CANCER AND HAS HAD TWO PROCEDURES BY UROLOGY DR TALLEY AND HAS BEEN RECEIVING BCG\par

## 2019-10-12 NOTE — PLAN
[FreeTextEntry1] : PT HERE FOR ANNUAL FEELS OK HAS APPT WITH CARDIOLOGY \par   ON B BLOCKER OFF ALDACTOINE HAD BRITTON ON IT FROM  [facility]   \par WILL CHECK LABS\par LABS DRAWN HERE DEFER EKG AS DONE RECENTLY FOR PREOP FOR BLADDER CANCER\par UNDER  STRESS HAS HAD  TWO PROCEDURES AND HAS RECIEVED  BCG\par OVERALL STABLE\par PT WNATS TO HOLD OFF ON FLU SHOT

## 2019-10-14 ENCOUNTER — APPOINTMENT (OUTPATIENT)
Dept: UROLOGY | Facility: CLINIC | Age: 74
End: 2019-10-14

## 2019-10-14 RX ORDER — OXYBUTYNIN CHLORIDE 5 MG/1
5 TABLET ORAL
Qty: 20 | Refills: 0 | Status: DISCONTINUED | COMMUNITY
Start: 2019-07-18 | End: 2019-10-14

## 2019-10-14 RX ORDER — CIPROFLOXACIN HYDROCHLORIDE 500 MG/1
500 TABLET, FILM COATED ORAL TWICE DAILY
Qty: 10 | Refills: 0 | Status: DISCONTINUED | COMMUNITY
Start: 2019-09-09 | End: 2019-10-14

## 2019-10-21 ENCOUNTER — APPOINTMENT (OUTPATIENT)
Dept: UROLOGY | Facility: CLINIC | Age: 74
End: 2019-10-21
Payer: MEDICARE

## 2019-10-21 VITALS — HEART RATE: 68 BPM | SYSTOLIC BLOOD PRESSURE: 168 MMHG | DIASTOLIC BLOOD PRESSURE: 69 MMHG

## 2019-10-21 LAB
BILIRUB UR QL STRIP: NORMAL
GLUCOSE UR-MCNC: NORMAL
HCG UR QL: 0.2 EU/DL
HGB UR QL STRIP.AUTO: NORMAL
KETONES UR-MCNC: NORMAL
LEUKOCYTE ESTERASE UR QL STRIP: NORMAL
NITRITE UR QL STRIP: NORMAL
PH UR STRIP: 6.5
PROT UR STRIP-MCNC: 100
SP GR UR STRIP: 1.01

## 2019-10-21 PROCEDURE — 81003 URINALYSIS AUTO W/O SCOPE: CPT | Mod: QW

## 2019-10-21 PROCEDURE — 51720 TREATMENT OF BLADDER LESION: CPT

## 2019-10-21 RX ORDER — BACILLUS CALMETTE-GUERIN 50 MG/50ML
50 POWDER, FOR SUSPENSION INTRAVESICAL
Qty: 0 | Refills: 0 | Status: COMPLETED | OUTPATIENT
Start: 2019-10-21

## 2019-10-21 RX ADMIN — BACILLUS CALMETTE-GUERIN 0 MG: 50 POWDER, FOR SUSPENSION INTRAVESICAL at 00:00

## 2019-10-22 RX ORDER — SOLIFENACIN SUCCINATE 5 MG/1
5 TABLET ORAL
Qty: 30 | Refills: 0 | Status: DISCONTINUED | COMMUNITY
Start: 2019-10-22 | End: 2019-10-22

## 2019-10-28 ENCOUNTER — APPOINTMENT (OUTPATIENT)
Dept: UROLOGY | Facility: CLINIC | Age: 74
End: 2019-10-28
Payer: MEDICARE

## 2019-10-28 PROCEDURE — 51720 TREATMENT OF BLADDER LESION: CPT

## 2019-10-28 RX ORDER — BACILLUS CALMETTE-GUERIN 50 MG/50ML
50 POWDER, FOR SUSPENSION INTRAVESICAL
Qty: 0 | Refills: 0 | Status: COMPLETED | OUTPATIENT
Start: 2019-10-28

## 2019-10-28 RX ORDER — BACILLUS CALMETTE-GUERIN 50 MG/50ML
50 POWDER, FOR SUSPENSION INTRAVESICAL
Qty: 1 | Refills: 0 | Status: COMPLETED | COMMUNITY
Start: 2019-10-28 | End: 2019-10-28

## 2019-10-28 RX ADMIN — BACILLUS CALMETTE-GUERIN 0 MG: 50 POWDER, FOR SUSPENSION INTRAVESICAL at 00:00

## 2019-11-07 ENCOUNTER — CLINICAL ADVICE (OUTPATIENT)
Age: 74
End: 2019-11-07

## 2019-11-08 LAB — BACTERIA UR CULT: NORMAL

## 2019-11-15 LAB
25(OH)D3 SERPL-MCNC: 20.7 NG/ML
ALBUMIN SERPL ELPH-MCNC: 4.9 G/DL
ALP BLD-CCNC: 69 U/L
ALT SERPL-CCNC: 29 U/L
ANION GAP SERPL CALC-SCNC: 15 MMOL/L
APPEARANCE: ABNORMAL
AST SERPL-CCNC: 23 U/L
BACTERIA UR CULT: NORMAL
BASOPHILS # BLD AUTO: 0.04 K/UL
BASOPHILS NFR BLD AUTO: 0.5 %
BILIRUB SERPL-MCNC: 0.5 MG/DL
BILIRUBIN URINE: ABNORMAL
BLOOD URINE: ABNORMAL
BUN SERPL-MCNC: 26 MG/DL
CALCIUM SERPL-MCNC: 9.9 MG/DL
CHLORIDE SERPL-SCNC: 103 MMOL/L
CHOLEST SERPL-MCNC: 190 MG/DL
CHOLEST/HDLC SERPL: 4.4 RATIO
CO2 SERPL-SCNC: 24 MMOL/L
COLOR: ABNORMAL
CREAT SERPL-MCNC: 0.84 MG/DL
EOSINOPHIL # BLD AUTO: 0.17 K/UL
EOSINOPHIL NFR BLD AUTO: 2.1 %
ESTIMATED AVERAGE GLUCOSE: 103 MG/DL
GLUCOSE QUALITATIVE U: NEGATIVE
GLUCOSE SERPL-MCNC: 103 MG/DL
HBA1C MFR BLD HPLC: 5.2 %
HCT VFR BLD CALC: 41.9 %
HDLC SERPL-MCNC: 43 MG/DL
HGB BLD-MCNC: 13.3 G/DL
IMM GRANULOCYTES NFR BLD AUTO: 0.4 %
KETONES URINE: NEGATIVE
LDLC SERPL CALC-MCNC: 102 MG/DL
LEUKOCYTE ESTERASE URINE: ABNORMAL
LYMPHOCYTES # BLD AUTO: 1.25 K/UL
LYMPHOCYTES NFR BLD AUTO: 15.1 %
MAN DIFF?: NORMAL
MCHC RBC-ENTMCNC: 30.2 PG
MCHC RBC-ENTMCNC: 31.7 GM/DL
MCV RBC AUTO: 95.2 FL
MONOCYTES # BLD AUTO: 0.39 K/UL
MONOCYTES NFR BLD AUTO: 4.7 %
NEUTROPHILS # BLD AUTO: 6.41 K/UL
NEUTROPHILS NFR BLD AUTO: 77.2 %
NITRITE URINE: POSITIVE
PH URINE: 6.5
PLATELET # BLD AUTO: 145 K/UL
POTASSIUM SERPL-SCNC: 4.9 MMOL/L
PROT SERPL-MCNC: 7.4 G/DL
PROTEIN URINE: ABNORMAL
RBC # BLD: 4.4 M/UL
RBC # FLD: 12.8 %
SODIUM SERPL-SCNC: 142 MMOL/L
SPECIFIC GRAVITY URINE: 1.01
T4 SERPL-MCNC: 6.1 UG/DL
TRIGL SERPL-MCNC: 223 MG/DL
TSH SERPL-ACNC: 4.59 UIU/ML
UROBILINOGEN URINE: NORMAL
WBC # FLD AUTO: 8.29 K/UL

## 2019-11-22 ENCOUNTER — MESSAGE (OUTPATIENT)
Age: 74
End: 2019-11-22

## 2019-11-25 ENCOUNTER — APPOINTMENT (OUTPATIENT)
Dept: UROLOGY | Facility: CLINIC | Age: 74
End: 2019-11-25
Payer: MEDICARE

## 2019-11-25 ENCOUNTER — OTHER (OUTPATIENT)
Age: 74
End: 2019-11-25

## 2019-11-25 VITALS
SYSTOLIC BLOOD PRESSURE: 173 MMHG | BODY MASS INDEX: 24.84 KG/M2 | WEIGHT: 135 LBS | HEART RATE: 70 BPM | HEIGHT: 62 IN | DIASTOLIC BLOOD PRESSURE: 83 MMHG

## 2019-11-25 LAB
BILIRUB UR QL STRIP: NORMAL
CLARITY UR: CLEAR
GLUCOSE UR-MCNC: NORMAL
HCG UR QL: 0.2 EU/DL
HGB UR QL STRIP.AUTO: ABNORMAL
KETONES UR-MCNC: NORMAL
LEUKOCYTE ESTERASE UR QL STRIP: ABNORMAL
NITRITE UR QL STRIP: NORMAL
PH UR STRIP: 6
PROT UR STRIP-MCNC: NORMAL
SP GR UR STRIP: 1

## 2019-11-25 PROCEDURE — 81003 URINALYSIS AUTO W/O SCOPE: CPT | Mod: QW

## 2019-11-25 PROCEDURE — 52000 CYSTOURETHROSCOPY: CPT

## 2019-11-27 LAB — URINE CYTOLOGY: NORMAL

## 2019-11-29 ENCOUNTER — TRANSCRIPTION ENCOUNTER (OUTPATIENT)
Age: 74
End: 2019-11-29

## 2019-12-03 ENCOUNTER — APPOINTMENT (OUTPATIENT)
Dept: UROLOGY | Facility: CLINIC | Age: 74
End: 2019-12-03
Payer: MEDICARE

## 2019-12-03 VITALS — HEART RATE: 70 BPM | DIASTOLIC BLOOD PRESSURE: 63 MMHG | SYSTOLIC BLOOD PRESSURE: 123 MMHG

## 2019-12-03 PROCEDURE — 51720 TREATMENT OF BLADDER LESION: CPT

## 2019-12-03 PROCEDURE — 81003 URINALYSIS AUTO W/O SCOPE: CPT | Mod: QW

## 2019-12-03 RX ORDER — GEMCITABINE HYDROCHLORIDE 2 G/52.6ML
2 INJECTION INTRAVENOUS
Refills: 0 | Status: COMPLETED | OUTPATIENT
Start: 2019-12-03

## 2019-12-03 RX ORDER — GEMCITABINE HYDROCHLORIDE 2 G/52.6ML
2 INJECTION INTRAVENOUS
Qty: 1 | Refills: 2 | Status: COMPLETED | COMMUNITY
Start: 2019-12-03 | End: 2019-12-03

## 2019-12-09 LAB
BILIRUB UR QL STRIP: NORMAL
CLARITY UR: CLEAR
COLLECTION METHOD: NORMAL
GLUCOSE UR-MCNC: NORMAL
HCG UR QL: 0.2 EU/DL
HGB UR QL STRIP.AUTO: NORMAL
KETONES UR-MCNC: NORMAL
LEUKOCYTE ESTERASE UR QL STRIP: NORMAL
NITRITE UR QL STRIP: NORMAL
PH UR STRIP: 6
PROT UR STRIP-MCNC: NORMAL
SP GR UR STRIP: 1.03

## 2020-01-02 ENCOUNTER — CHART COPY (OUTPATIENT)
Age: 75
End: 2020-01-02

## 2020-01-02 RX ORDER — BACILLUS CALMETTE-GUERIN 50 MG/50ML
50 POWDER, FOR SUSPENSION INTRAVESICAL
Qty: 1 | Refills: 0 | Status: COMPLETED | COMMUNITY
Start: 2019-10-21 | End: 2019-10-21

## 2020-01-06 ENCOUNTER — APPOINTMENT (OUTPATIENT)
Dept: UROLOGY | Facility: CLINIC | Age: 75
End: 2020-01-06
Payer: MEDICARE

## 2020-01-06 VITALS — DIASTOLIC BLOOD PRESSURE: 77 MMHG | SYSTOLIC BLOOD PRESSURE: 164 MMHG | HEART RATE: 59 BPM | RESPIRATION RATE: 15 BRPM

## 2020-01-06 LAB
BILIRUB UR QL STRIP: NORMAL
CLARITY UR: CLEAR
COLLECTION METHOD: NORMAL
GLUCOSE UR-MCNC: NORMAL
HCG UR QL: 0.2 EU/DL
HGB UR QL STRIP.AUTO: NORMAL
KETONES UR-MCNC: NORMAL
LEUKOCYTE ESTERASE UR QL STRIP: ABNORMAL
NITRITE UR QL STRIP: NORMAL
PH UR STRIP: 6.5
PROT UR STRIP-MCNC: NORMAL
SP GR UR STRIP: 1.01

## 2020-01-06 PROCEDURE — 81003 URINALYSIS AUTO W/O SCOPE: CPT | Mod: QW

## 2020-01-06 PROCEDURE — 51720 TREATMENT OF BLADDER LESION: CPT

## 2020-01-06 RX ORDER — GEMCITABINE 2 G/50ML
2 INJECTION, POWDER, LYOPHILIZED, FOR SOLUTION INTRAVENOUS
Qty: 1 | Refills: 0 | Status: COMPLETED | COMMUNITY
Start: 2020-01-06 | End: 2020-01-06

## 2020-01-06 RX ORDER — GEMCITABINE 2 G/50ML
2 INJECTION, POWDER, LYOPHILIZED, FOR SOLUTION INTRAVENOUS
Qty: 1 | Refills: 0 | Status: COMPLETED | OUTPATIENT
Start: 2020-01-06

## 2020-01-06 RX ADMIN — GEMCITABINE 0 GM: 1 INJECTION, POWDER, LYOPHILIZED, FOR SOLUTION INTRAVENOUS at 00:00

## 2020-01-07 LAB — URINE CYTOLOGY: NORMAL

## 2020-02-03 ENCOUNTER — APPOINTMENT (OUTPATIENT)
Dept: UROLOGY | Facility: CLINIC | Age: 75
End: 2020-02-03
Payer: MEDICARE

## 2020-02-03 VITALS
HEART RATE: 59 BPM | DIASTOLIC BLOOD PRESSURE: 75 MMHG | HEIGHT: 62 IN | WEIGHT: 136 LBS | BODY MASS INDEX: 25.03 KG/M2 | SYSTOLIC BLOOD PRESSURE: 152 MMHG

## 2020-02-03 PROCEDURE — 51720 TREATMENT OF BLADDER LESION: CPT

## 2020-02-03 RX ORDER — GEMCITABINE HYDROCHLORIDE 2 G/52.6ML
2 INJECTION INTRAVENOUS
Refills: 0 | Status: COMPLETED | OUTPATIENT
Start: 2020-02-03

## 2020-02-03 RX ADMIN — Medication 0 GM/52.6ML: at 00:00

## 2020-02-12 DIAGNOSIS — R11.0 NAUSEA: ICD-10-CM

## 2020-02-19 ENCOUNTER — APPOINTMENT (OUTPATIENT)
Dept: UROLOGY | Facility: CLINIC | Age: 75
End: 2020-02-19
Payer: MEDICARE

## 2020-02-19 VITALS
RESPIRATION RATE: 15 BRPM | DIASTOLIC BLOOD PRESSURE: 70 MMHG | SYSTOLIC BLOOD PRESSURE: 192 MMHG | HEIGHT: 62 IN | HEART RATE: 61 BPM

## 2020-02-19 LAB
BILIRUB UR QL STRIP: NORMAL
CLARITY UR: CLEAR
COLLECTION METHOD: NORMAL
GLUCOSE UR-MCNC: NORMAL
HCG UR QL: 0.2 EU/DL
HGB UR QL STRIP.AUTO: NORMAL
KETONES UR-MCNC: NORMAL
LEUKOCYTE ESTERASE UR QL STRIP: ABNORMAL
NITRITE UR QL STRIP: NORMAL
PH UR STRIP: 6
PROT UR STRIP-MCNC: NORMAL
SP GR UR STRIP: 1.02

## 2020-02-19 PROCEDURE — 81003 URINALYSIS AUTO W/O SCOPE: CPT | Mod: QW

## 2020-02-19 PROCEDURE — 52000 CYSTOURETHROSCOPY: CPT

## 2020-02-23 LAB
25(OH)D3 SERPL-MCNC: 37.5 NG/ML
ALBUMIN SERPL ELPH-MCNC: 4.4 G/DL
ALP BLD-CCNC: 61 U/L
ALT SERPL-CCNC: 18 U/L
ANION GAP SERPL CALC-SCNC: 12 MMOL/L
AST SERPL-CCNC: 15 U/L
BASOPHILS # BLD AUTO: 0.03 K/UL
BASOPHILS NFR BLD AUTO: 0.6 %
BILIRUB SERPL-MCNC: 0.5 MG/DL
BUN SERPL-MCNC: 28 MG/DL
CALCIUM SERPL-MCNC: 9.7 MG/DL
CHLORIDE SERPL-SCNC: 103 MMOL/L
CHOLEST SERPL-MCNC: 179 MG/DL
CHOLEST/HDLC SERPL: 4.8 RATIO
CO2 SERPL-SCNC: 26 MMOL/L
CREAT SERPL-MCNC: 1.22 MG/DL
EOSINOPHIL # BLD AUTO: 0.2 K/UL
EOSINOPHIL NFR BLD AUTO: 4.1 %
ESTIMATED AVERAGE GLUCOSE: 105 MG/DL
GLUCOSE SERPL-MCNC: 111 MG/DL
HBA1C MFR BLD HPLC: 5.3 %
HCT VFR BLD CALC: 39.4 %
HDLC SERPL-MCNC: 38 MG/DL
HGB BLD-MCNC: 13.2 G/DL
IMM GRANULOCYTES NFR BLD AUTO: 0 %
LDLC SERPL CALC-MCNC: 102 MG/DL
LYMPHOCYTES # BLD AUTO: 1.11 K/UL
LYMPHOCYTES NFR BLD AUTO: 22.7 %
MAN DIFF?: NORMAL
MCHC RBC-ENTMCNC: 30.3 PG
MCHC RBC-ENTMCNC: 33.5 GM/DL
MCV RBC AUTO: 90.6 FL
MONOCYTES # BLD AUTO: 0.47 K/UL
MONOCYTES NFR BLD AUTO: 9.6 %
NEUTROPHILS # BLD AUTO: 3.08 K/UL
NEUTROPHILS NFR BLD AUTO: 63 %
PLATELET # BLD AUTO: 141 K/UL
POTASSIUM SERPL-SCNC: 5 MMOL/L
PROT SERPL-MCNC: 7.2 G/DL
RBC # BLD: 4.35 M/UL
RBC # FLD: 13 %
SODIUM SERPL-SCNC: 140 MMOL/L
T4 SERPL-MCNC: 5.8 UG/DL
TRIGL SERPL-MCNC: 199 MG/DL
TSH SERPL-ACNC: 3.17 UIU/ML
WBC # FLD AUTO: 4.89 K/UL

## 2020-02-24 LAB — URINE CYTOLOGY: NORMAL

## 2020-03-02 ENCOUNTER — TRANSCRIPTION ENCOUNTER (OUTPATIENT)
Age: 75
End: 2020-03-02

## 2020-03-04 ENCOUNTER — RX RENEWAL (OUTPATIENT)
Age: 75
End: 2020-03-04

## 2020-04-22 ENCOUNTER — TRANSCRIPTION ENCOUNTER (OUTPATIENT)
Age: 75
End: 2020-04-22

## 2020-04-30 ENCOUNTER — APPOINTMENT (OUTPATIENT)
Dept: UROLOGY | Facility: CLINIC | Age: 75
End: 2020-04-30
Payer: MEDICARE

## 2020-04-30 VITALS — SYSTOLIC BLOOD PRESSURE: 180 MMHG | HEART RATE: 73 BPM | RESPIRATION RATE: 16 BRPM | DIASTOLIC BLOOD PRESSURE: 89 MMHG

## 2020-04-30 PROCEDURE — 52000 CYSTOURETHROSCOPY: CPT

## 2020-04-30 RX ORDER — AMOXICILLIN AND CLAVULANATE POTASSIUM 500; 125 MG/1; MG/1
500-125 TABLET, FILM COATED ORAL
Qty: 10 | Refills: 0 | Status: DISCONTINUED | COMMUNITY
Start: 2019-09-11 | End: 2020-04-30

## 2020-05-04 LAB — URINE CYTOLOGY: NORMAL

## 2020-05-06 ENCOUNTER — APPOINTMENT (OUTPATIENT)
Dept: UROLOGY | Facility: CLINIC | Age: 75
End: 2020-05-06

## 2020-05-06 ENCOUNTER — APPOINTMENT (OUTPATIENT)
Dept: UROLOGY | Facility: CLINIC | Age: 75
End: 2020-05-06
Payer: MEDICARE

## 2020-05-06 VITALS
RESPIRATION RATE: 16 BRPM | TEMPERATURE: 97.3 F | HEART RATE: 69 BPM | DIASTOLIC BLOOD PRESSURE: 81 MMHG | SYSTOLIC BLOOD PRESSURE: 186 MMHG

## 2020-05-06 LAB
BILIRUB UR QL STRIP: NORMAL
CLARITY UR: CLEAR
COLLECTION METHOD: NORMAL
GLUCOSE UR-MCNC: NORMAL
HCG UR QL: 0.2 EU/DL
HGB UR QL STRIP.AUTO: ABNORMAL
KETONES UR-MCNC: NORMAL
LEUKOCYTE ESTERASE UR QL STRIP: ABNORMAL
NITRITE UR QL STRIP: NORMAL
PH UR STRIP: 5.5
PROT UR STRIP-MCNC: NORMAL
SP GR UR STRIP: 1.01

## 2020-05-06 PROCEDURE — 81003 URINALYSIS AUTO W/O SCOPE: CPT | Mod: QW

## 2020-05-06 PROCEDURE — 51720 TREATMENT OF BLADDER LESION: CPT

## 2020-05-06 RX ORDER — GEMCITABINE 2 G/50ML
2 INJECTION, POWDER, LYOPHILIZED, FOR SOLUTION INTRAVENOUS
Qty: 1 | Refills: 0 | Status: COMPLETED | COMMUNITY
Start: 2020-05-06 | End: 2020-05-06

## 2020-05-06 RX ORDER — GEMCITABINE 2 G/50ML
2 INJECTION, POWDER, LYOPHILIZED, FOR SOLUTION INTRAVENOUS
Qty: 1 | Refills: 0 | Status: COMPLETED | OUTPATIENT
Start: 2020-05-06

## 2020-06-03 ENCOUNTER — APPOINTMENT (OUTPATIENT)
Dept: UROLOGY | Facility: CLINIC | Age: 75
End: 2020-06-03
Payer: MEDICARE

## 2020-06-03 VITALS
RESPIRATION RATE: 14 BRPM | SYSTOLIC BLOOD PRESSURE: 187 MMHG | DIASTOLIC BLOOD PRESSURE: 83 MMHG | TEMPERATURE: 97.9 F | HEART RATE: 66 BPM

## 2020-06-03 LAB
BILIRUB UR QL STRIP: NORMAL
CLARITY UR: CLEAR
COLLECTION METHOD: NORMAL
GLUCOSE UR-MCNC: NORMAL
HCG UR QL: 0.2 EU/DL
HGB UR QL STRIP.AUTO: ABNORMAL
KETONES UR-MCNC: NORMAL
LEUKOCYTE ESTERASE UR QL STRIP: ABNORMAL
NITRITE UR QL STRIP: NORMAL
PH UR STRIP: 5.5
PROT UR STRIP-MCNC: NORMAL
SP GR UR STRIP: 1.02

## 2020-06-03 PROCEDURE — 81003 URINALYSIS AUTO W/O SCOPE: CPT | Mod: QW

## 2020-06-03 PROCEDURE — 51720 TREATMENT OF BLADDER LESION: CPT

## 2020-06-03 RX ORDER — GEMCITABINE HYDROCHLORIDE 2 G/52.6ML
2 INJECTION INTRAVENOUS
Qty: 1 | Refills: 2 | Status: COMPLETED | COMMUNITY
Start: 2020-02-03 | End: 2020-02-03

## 2020-06-03 RX ORDER — GEMCITABINE 2 G/50ML
2 INJECTION, POWDER, LYOPHILIZED, FOR SOLUTION INTRAVENOUS
Qty: 1 | Refills: 0 | Status: COMPLETED | OUTPATIENT
Start: 2020-06-03

## 2020-06-03 RX ORDER — OXYBUTYNIN CHLORIDE 5 MG/1
5 TABLET ORAL
Qty: 30 | Refills: 3 | Status: DISCONTINUED | COMMUNITY
Start: 2019-10-22 | End: 2020-06-03

## 2020-06-03 RX ORDER — LIDOCAINE HYDROCHLORIDE 20 MG/ML
2 JELLY TOPICAL
Qty: 10 | Refills: 0 | Status: DISCONTINUED | COMMUNITY
Start: 2019-11-07 | End: 2020-06-03

## 2020-06-03 RX ORDER — GEMCITABINE HYDROCHLORIDE 2 G/52.6ML
2 INJECTION INTRAVENOUS
Qty: 1 | Refills: 0 | Status: COMPLETED | COMMUNITY
Start: 2020-06-03 | End: 2020-06-03

## 2020-06-03 RX ADMIN — GEMCITABINE 0 GM: 1 INJECTION, POWDER, LYOPHILIZED, FOR SOLUTION INTRAVENOUS at 00:00

## 2020-06-10 ENCOUNTER — APPOINTMENT (OUTPATIENT)
Dept: CT IMAGING | Facility: CLINIC | Age: 75
End: 2020-06-10
Payer: MEDICARE

## 2020-06-10 ENCOUNTER — OUTPATIENT (OUTPATIENT)
Dept: OUTPATIENT SERVICES | Facility: HOSPITAL | Age: 75
LOS: 1 days | End: 2020-06-10
Payer: MEDICARE

## 2020-06-10 ENCOUNTER — RESULT REVIEW (OUTPATIENT)
Age: 75
End: 2020-06-10

## 2020-06-10 DIAGNOSIS — C67.9 MALIGNANT NEOPLASM OF BLADDER, UNSPECIFIED: ICD-10-CM

## 2020-06-10 DIAGNOSIS — G56.00 CARPAL TUNNEL SYNDROME, UNSPECIFIED UPPER LIMB: Chronic | ICD-10-CM

## 2020-06-10 DIAGNOSIS — Z98.890 OTHER SPECIFIED POSTPROCEDURAL STATES: Chronic | ICD-10-CM

## 2020-06-10 DIAGNOSIS — Z00.8 ENCOUNTER FOR OTHER GENERAL EXAMINATION: ICD-10-CM

## 2020-06-10 DIAGNOSIS — Z90.49 ACQUIRED ABSENCE OF OTHER SPECIFIED PARTS OF DIGESTIVE TRACT: Chronic | ICD-10-CM

## 2020-06-10 DIAGNOSIS — C67.9 MALIGNANT NEOPLASM OF BLADDER, UNSPECIFIED: Chronic | ICD-10-CM

## 2020-06-10 PROCEDURE — 74178 CT ABD&PLV WO CNTR FLWD CNTR: CPT

## 2020-06-10 PROCEDURE — 74178 CT ABD&PLV WO CNTR FLWD CNTR: CPT | Mod: 26

## 2020-07-01 ENCOUNTER — APPOINTMENT (OUTPATIENT)
Dept: UROLOGY | Facility: CLINIC | Age: 75
End: 2020-07-01
Payer: MEDICARE

## 2020-07-01 VITALS
DIASTOLIC BLOOD PRESSURE: 53 MMHG | HEART RATE: 76 BPM | TEMPERATURE: 98 F | SYSTOLIC BLOOD PRESSURE: 163 MMHG | RESPIRATION RATE: 18 BRPM

## 2020-07-01 PROCEDURE — 51720 TREATMENT OF BLADDER LESION: CPT

## 2020-07-01 RX ORDER — GEMCITABINE 2 G/50ML
2 INJECTION, POWDER, LYOPHILIZED, FOR SOLUTION INTRAVENOUS
Qty: 1 | Refills: 0 | Status: COMPLETED | OUTPATIENT
Start: 2020-07-01

## 2020-07-01 RX ADMIN — GEMCITABINE 0 GM: 2 INJECTION, POWDER, LYOPHILIZED, FOR SOLUTION INTRAVENOUS at 00:00

## 2020-08-07 ENCOUNTER — APPOINTMENT (OUTPATIENT)
Dept: UROGYNECOLOGY | Facility: CLINIC | Age: 75
End: 2020-08-07
Payer: MEDICARE

## 2020-08-07 DIAGNOSIS — Z86.018 PERSONAL HISTORY OF OTHER BENIGN NEOPLASM: ICD-10-CM

## 2020-08-07 DIAGNOSIS — Z63.4 DISAPPEARANCE AND DEATH OF FAMILY MEMBER: ICD-10-CM

## 2020-08-07 DIAGNOSIS — K58.1 IRRITABLE BOWEL SYNDROME WITH CONSTIPATION: ICD-10-CM

## 2020-08-07 DIAGNOSIS — Z78.9 OTHER SPECIFIED HEALTH STATUS: ICD-10-CM

## 2020-08-07 DIAGNOSIS — Z83.3 FAMILY HISTORY OF DIABETES MELLITUS: ICD-10-CM

## 2020-08-07 DIAGNOSIS — Z82.49 FAMILY HISTORY OF ISCHEMIC HEART DISEASE AND OTHER DISEASES OF THE CIRCULATORY SYSTEM: ICD-10-CM

## 2020-08-07 LAB
BILIRUB UR QL STRIP: NEGATIVE
CLARITY UR: CLEAR
COLLECTION METHOD: NORMAL
GLUCOSE UR-MCNC: NEGATIVE
HCG UR QL: 0.2 EU/DL
HGB UR QL STRIP.AUTO: NEGATIVE
KETONES UR-MCNC: NEGATIVE
LEUKOCYTE ESTERASE UR QL STRIP: NEGATIVE
NITRITE UR QL STRIP: NEGATIVE
PH UR STRIP: 5.5
PROT UR STRIP-MCNC: NEGATIVE
SP GR UR STRIP: 1.01

## 2020-08-07 PROCEDURE — 81003 URINALYSIS AUTO W/O SCOPE: CPT | Mod: QW

## 2020-08-07 PROCEDURE — 51701 INSERT BLADDER CATHETER: CPT

## 2020-08-07 PROCEDURE — 99202 OFFICE O/P NEW SF 15 MIN: CPT | Mod: 25

## 2020-08-07 RX ORDER — UBIDECARENONE/VIT E ACET 100MG-5
CAPSULE ORAL
Refills: 0 | Status: ACTIVE | COMMUNITY

## 2020-08-07 SDOH — SOCIAL STABILITY - SOCIAL INSECURITY: DISSAPEARANCE AND DEATH OF FAMILY MEMBER: Z63.4

## 2020-08-07 NOTE — HISTORY OF PRESENT ILLNESS
[FreeTextEntry1] : Since TURBT in 2019 and diagnosis/management for bladder cancer, urinary urgency frequency affecting QOL, rare UUI episode. Has been undergoing surveillence cystos with bx prn, and has had rounds of intravesical chemo with urology. Most recent BIs completed last month. Reports 3 anticholinergics have not helped symptoms. Has had mitomycin, BCG, and gemcitibine. Ruled-out for UTIs with neg UCxs. No gross hematuria at present, no dysuria. +Urethral pain at times. Normal bowel movements without constipation or straining. Not currently sexually active, no vag bleeding, no bulge or pressure from vagina. Prior smoker. Also reports vag dryness.\par \par Recently per Gyn underwent saline sono, has followup, states polyp, a fibroid, 2 small simple ov cysts, and thickened EE noted - followed per Gyn.

## 2020-08-07 NOTE — REASON FOR VISIT
[Questionnaire Received] : Patient questionnaire received [Urinary Urgency] : urinary urgency [Urine Frequency] : urine frequency [Urinary Incontinence] : urinary incontinence

## 2020-08-07 NOTE — PROCEDURE
[Straight Catheterization] : insertion of a straight catheter [Urgent Incontinence] : urgent incontinence [Urinary Frequency] : urinary frequency [___ Fr Straight Tip] : a [unfilled] in Armenian straight tip catheter [Patient] : the patient [None] : there were no complications with the catheter insertion [Clear] : clear [No Complications] : no complications [Post procedure instructions and information given] : Post procedure instructions and information were given and reviewed with patient. [Tolerated Well] : the patient tolerated the procedure well [1] : 1 [FreeTextEntry1] : cathed to obtain pvr and uncontaminated specimen

## 2020-08-07 NOTE — ASSESSMENT
[FreeTextEntry1] : Carmen is a pleasant 73 yo P4, PSHx includes TURBT in 2019 for bladder cancer, presents with OAB-dry symptoms, VD demonstrated about 26v daily. On exam, her empty supine CST was neg and she did not have positive urethral hypermobility. Her straight-cath PVR volume was normal and the dip was neg. On pelvic exam, there were no appreciable masses or lesions or cysts. She had a narrowed atrohic introitus and atrophic urethral caruncle. Pelvic floor muscle contraction strength was present but weak. There was no levator or pelvic floor musculature banding, tightness, or tenderness. POPQ exam did not demonstrate clinically significant pelvic organ prolapse.\par \par The patient has urinary symptoms consistent with overactive bladder. The etiology of OAB was discussed. Management options including observation, behavioral modifications (dietary changes, monitoring fluid intake, bladder training, timed voids, use of pads/protective garments), kegels, PT, medications, PTNS, SNS, and bladder Botox were all reviewed. We discussed that OAB symptoms after TURBT are expected, and it is likely with ongoing bladder cancer surveillence and treatmetns that they may not completely resolve. However, OAB management options that she is a candidate for may lessen her symptoms. She would like to try PT, referral given. She will use topical vaginal estrogen moisturizer over the counter for atrophic dryness and irritation as well. All ques answered, RTO 3 months or prn.\par \par Plan:\par [] pelvic floor PT - rx provided\par [] vag moisturizer per Gyn\par [] consider myrbetriq if BPs stable/normal, vs 3rd line OAB options - can discuss with urology if candidate for bladder botox injections, consider allergy testing first

## 2020-08-07 NOTE — PHYSICAL EXAM
[Chaperone Present] : A chaperone was present in the examining room during all aspects of the physical examination [No Edema] : ~T edema was not present [Oriented x3] : oriented to person, place, and time [No Acute Distress] : in no acute distress [Symmetrical] : the neck was ~L symmetrical [None] : no CVA tenderness [Warm and Dry] : was warm and dry to touch [Normal Gait] : gait was normal [Labia Minora] : were normal [Labia Majora] : were normal [Bartholin's Gland] : both Bartholin's glands were normal  [No Bleeding] : there was no active vaginal bleeding [Atrophy] : atrophy [Aa ____] : Aa [unfilled] [2] : 2 [Ba ____] : Ba [unfilled] [C ____] : C [unfilled] [PB ____] : PB [unfilled] [GH ____] : GH [unfilled] [Ap ____] : Ap [unfilled] [TVL ____] : TVL  [unfilled] [D ____] : D [unfilled] [Bp ____] : Bp [unfilled] [] : 0 [Normal] : normal [Exam Deferred] : was deferred [Post Void Residual ____ml] : post void residual was [unfilled] ml [Soft] :  the cervix was soft [Tenderness] : ~T no ~M abdominal tenderness observed [Cough] : no cough [Distended] : not distended [Inguinal LAD] : no adenopathy was noted in the inguinal lymph nodes [de-identified] : no appreciable masses [FreeTextEntry3] : CST neg, no urethral hyeprmob, +atrophic

## 2020-08-07 NOTE — OB HISTORY
[Vaginal ___] : [unfilled] vaginal delivery(s) [Last Pap Smear ___] : date of last pap smear was on [unfilled] [Sexually Active] : is not sexually active [FreeTextEntry1] : largest baby over 8 lbs [Abnormal Pap Smear] : normal pap smear

## 2020-08-19 ENCOUNTER — APPOINTMENT (OUTPATIENT)
Dept: INTERNAL MEDICINE | Facility: CLINIC | Age: 75
End: 2020-08-19
Payer: MEDICARE

## 2020-08-19 VITALS
BODY MASS INDEX: 24.84 KG/M2 | SYSTOLIC BLOOD PRESSURE: 120 MMHG | TEMPERATURE: 98.6 F | DIASTOLIC BLOOD PRESSURE: 80 MMHG | WEIGHT: 135 LBS | HEIGHT: 62 IN

## 2020-08-19 DIAGNOSIS — R63.0 ANOREXIA: ICD-10-CM

## 2020-08-19 DIAGNOSIS — E55.9 VITAMIN D DEFICIENCY, UNSPECIFIED: ICD-10-CM

## 2020-08-19 DIAGNOSIS — R73.03 PREDIABETES.: ICD-10-CM

## 2020-08-19 PROCEDURE — 99215 OFFICE O/P EST HI 40 MIN: CPT

## 2020-08-19 RX ORDER — LOSARTAN POTASSIUM 100 MG/1
100 TABLET, FILM COATED ORAL DAILY
Qty: 90 | Refills: 3 | Status: DISCONTINUED | COMMUNITY
Start: 2020-08-19 | End: 2020-08-19

## 2020-08-19 RX ORDER — GEMCITABINE 2 G/50ML
2 INJECTION, POWDER, LYOPHILIZED, FOR SOLUTION INTRAVENOUS
Qty: 1 | Refills: 0 | Status: DISCONTINUED | COMMUNITY
Start: 2020-04-30 | End: 2020-08-19

## 2020-08-19 RX ORDER — SOLIFENACIN SUCCINATE 10 MG/1
10 TABLET ORAL
Qty: 30 | Refills: 3 | Status: DISCONTINUED | COMMUNITY
Start: 2019-11-25 | End: 2020-08-19

## 2020-08-19 NOTE — PLAN
[FreeTextEntry1] :   PT HERE FOR  FOLLOW UP EXAM FEELS OK  BUT MAIN ISSUE IS URINARY FREQUENCY \par PT HAS DIAGNOSIS OF BLADDER CANCER AND HAD RECEIVED BCG AND IS NO ON GEMZAR\par WANTS TO START MYBERTRIQ BUT WS TOLD THERE ARE BP CONCERNS OVER FEELS UNCOMFORTABLE NALB ETO GO OUT BECAUSE OF THE URINATION ISSUES HAS SEEN UROLOGY AND UROGYNECOLOGY  WILL CONTINUE METOPROLOL AND MONITOR BP TOMAS TRY TO GET OFF METOPRTOLOL AS PT FEEELS FATIGUE FOMR B ETA BLOCKER WILL EVENTUALLY TRANSITION TO  [facility] BU TWOULD LOIKE HER TO STAT MYRBETRIQ  AS SOON AS S HE CAN

## 2020-08-19 NOTE — HISTORY OF PRESENT ILLNESS
[FreeTextEntry1] : FOLLOW UP [de-identified] : PT HERE FOR  FOLLOW UP EXAM FEELS OK  BUT MAIN ISSUE IS URINARY FREQUENCY \par PT HAS DIAGNOSIS OF BLADDER CANCER AND HAD RECEIVED BCG AND IS NO ON GEMZAR\par WANTS TO START MYBERTRIQ BUT WS TOLD THERE ARE BP CONCERNS OVER FEELS UNCOMFORTABLE NALB ETO GO OUT BECUSE OF THE URINATION ISSUES HAS SEEN UROLOGY AND UROGYNECOLOGY \par

## 2020-08-26 DIAGNOSIS — Z23 ENCOUNTER FOR IMMUNIZATION: ICD-10-CM

## 2020-08-30 LAB
25(OH)D3 SERPL-MCNC: 43.1 NG/ML
ALBUMIN SERPL ELPH-MCNC: 4.5 G/DL
ALP BLD-CCNC: 60 U/L
ALT SERPL-CCNC: 26 U/L
ANION GAP SERPL CALC-SCNC: 13 MMOL/L
AST SERPL-CCNC: 23 U/L
BASOPHILS # BLD AUTO: 0.02 K/UL
BASOPHILS NFR BLD AUTO: 0.3 %
BILIRUB SERPL-MCNC: 0.9 MG/DL
BUN SERPL-MCNC: 29 MG/DL
CALCIUM SERPL-MCNC: 9.7 MG/DL
CHLORIDE SERPL-SCNC: 103 MMOL/L
CHOLEST SERPL-MCNC: 167 MG/DL
CHOLEST/HDLC SERPL: 4.6 RATIO
CO2 SERPL-SCNC: 23 MMOL/L
CREAT SERPL-MCNC: 1.22 MG/DL
EOSINOPHIL # BLD AUTO: 0.18 K/UL
EOSINOPHIL NFR BLD AUTO: 2.6 %
ESTIMATED AVERAGE GLUCOSE: 105 MG/DL
GLUCOSE SERPL-MCNC: 100 MG/DL
HBA1C MFR BLD HPLC: 5.3 %
HCT VFR BLD CALC: 40 %
HDLC SERPL-MCNC: 37 MG/DL
HGB BLD-MCNC: 12.5 G/DL
IMM GRANULOCYTES NFR BLD AUTO: 0.3 %
LDLC SERPL CALC-MCNC: 88 MG/DL
LYMPHOCYTES # BLD AUTO: 0.92 K/UL
LYMPHOCYTES NFR BLD AUTO: 13.1 %
MAN DIFF?: NORMAL
MCHC RBC-ENTMCNC: 30.1 PG
MCHC RBC-ENTMCNC: 31.3 GM/DL
MCV RBC AUTO: 96.4 FL
MONOCYTES # BLD AUTO: 0.62 K/UL
MONOCYTES NFR BLD AUTO: 8.8 %
NEUTROPHILS # BLD AUTO: 5.26 K/UL
NEUTROPHILS NFR BLD AUTO: 74.9 %
PLATELET # BLD AUTO: NORMAL K/UL
POTASSIUM SERPL-SCNC: 4.9 MMOL/L
PROT SERPL-MCNC: 6.9 G/DL
RBC # BLD: 4.15 M/UL
RBC # FLD: 12.8 %
SODIUM SERPL-SCNC: 140 MMOL/L
T4 SERPL-MCNC: 5.1 UG/DL
TRIGL SERPL-MCNC: 215 MG/DL
TSH SERPL-ACNC: 2.03 UIU/ML
WBC # FLD AUTO: 7.02 K/UL

## 2020-09-01 ENCOUNTER — APPOINTMENT (OUTPATIENT)
Dept: UROGYNECOLOGY | Facility: CLINIC | Age: 75
End: 2020-09-01
Payer: MEDICARE

## 2020-09-01 PROCEDURE — 64566 NEUROELTRD STIM POST TIBIAL: CPT

## 2020-09-08 ENCOUNTER — APPOINTMENT (OUTPATIENT)
Dept: UROGYNECOLOGY | Facility: CLINIC | Age: 75
End: 2020-09-08
Payer: MEDICARE

## 2020-09-08 PROCEDURE — 64566 NEUROELTRD STIM POST TIBIAL: CPT

## 2020-09-15 ENCOUNTER — APPOINTMENT (OUTPATIENT)
Dept: UROGYNECOLOGY | Facility: CLINIC | Age: 75
End: 2020-09-15
Payer: MEDICARE

## 2020-09-15 PROCEDURE — 64566 NEUROELTRD STIM POST TIBIAL: CPT

## 2020-09-22 ENCOUNTER — APPOINTMENT (OUTPATIENT)
Dept: UROGYNECOLOGY | Facility: CLINIC | Age: 75
End: 2020-09-22
Payer: MEDICARE

## 2020-09-22 PROCEDURE — 64566 NEUROELTRD STIM POST TIBIAL: CPT

## 2020-09-29 ENCOUNTER — APPOINTMENT (OUTPATIENT)
Dept: UROGYNECOLOGY | Facility: CLINIC | Age: 75
End: 2020-09-29
Payer: MEDICARE

## 2020-09-29 PROCEDURE — 64566 NEUROELTRD STIM POST TIBIAL: CPT

## 2020-10-01 ENCOUNTER — RESULT REVIEW (OUTPATIENT)
Age: 75
End: 2020-10-01

## 2020-10-02 ENCOUNTER — APPOINTMENT (OUTPATIENT)
Dept: DERMATOLOGY | Facility: CLINIC | Age: 75
End: 2020-10-02
Payer: MEDICARE

## 2020-10-02 PROCEDURE — 17262 DSTRJ MAL LES T/A/L 1.1-2.0: CPT

## 2020-10-02 PROCEDURE — 99203 OFFICE O/P NEW LOW 30 MIN: CPT | Mod: 25

## 2020-10-05 ENCOUNTER — APPOINTMENT (OUTPATIENT)
Dept: UROLOGY | Facility: CLINIC | Age: 75
End: 2020-10-05
Payer: MEDICARE

## 2020-10-05 PROCEDURE — 52000 CYSTOURETHROSCOPY: CPT

## 2020-10-06 ENCOUNTER — APPOINTMENT (OUTPATIENT)
Dept: UROGYNECOLOGY | Facility: CLINIC | Age: 75
End: 2020-10-06
Payer: MEDICARE

## 2020-10-06 PROCEDURE — 64566 NEUROELTRD STIM POST TIBIAL: CPT

## 2020-10-13 ENCOUNTER — APPOINTMENT (OUTPATIENT)
Dept: UROGYNECOLOGY | Facility: CLINIC | Age: 75
End: 2020-10-13
Payer: MEDICARE

## 2020-10-13 PROCEDURE — 64566 NEUROELTRD STIM POST TIBIAL: CPT

## 2020-10-22 ENCOUNTER — APPOINTMENT (OUTPATIENT)
Dept: UROGYNECOLOGY | Facility: CLINIC | Age: 75
End: 2020-10-22
Payer: MEDICARE

## 2020-10-22 PROCEDURE — 64566 NEUROELTRD STIM POST TIBIAL: CPT

## 2020-10-27 ENCOUNTER — APPOINTMENT (OUTPATIENT)
Dept: UROGYNECOLOGY | Facility: CLINIC | Age: 75
End: 2020-10-27
Payer: MEDICARE

## 2020-10-27 PROCEDURE — 64566 NEUROELTRD STIM POST TIBIAL: CPT

## 2020-11-03 ENCOUNTER — APPOINTMENT (OUTPATIENT)
Dept: UROGYNECOLOGY | Facility: CLINIC | Age: 75
End: 2020-11-03
Payer: MEDICARE

## 2020-11-03 PROCEDURE — 64566 NEUROELTRD STIM POST TIBIAL: CPT

## 2020-11-04 ENCOUNTER — APPOINTMENT (OUTPATIENT)
Dept: RADIOLOGY | Facility: CLINIC | Age: 75
End: 2020-11-04

## 2020-11-04 ENCOUNTER — TRANSCRIPTION ENCOUNTER (OUTPATIENT)
Age: 75
End: 2020-11-04

## 2020-11-04 ENCOUNTER — OUTPATIENT (OUTPATIENT)
Dept: OUTPATIENT SERVICES | Facility: HOSPITAL | Age: 75
LOS: 1 days | End: 2020-11-04
Payer: MEDICARE

## 2020-11-04 DIAGNOSIS — Z98.890 OTHER SPECIFIED POSTPROCEDURAL STATES: Chronic | ICD-10-CM

## 2020-11-04 DIAGNOSIS — G56.00 CARPAL TUNNEL SYNDROME, UNSPECIFIED UPPER LIMB: Chronic | ICD-10-CM

## 2020-11-04 DIAGNOSIS — Z90.49 ACQUIRED ABSENCE OF OTHER SPECIFIED PARTS OF DIGESTIVE TRACT: Chronic | ICD-10-CM

## 2020-11-04 DIAGNOSIS — M25.572 PAIN IN LEFT ANKLE AND JOINTS OF LEFT FOOT: ICD-10-CM

## 2020-11-04 DIAGNOSIS — C67.9 MALIGNANT NEOPLASM OF BLADDER, UNSPECIFIED: Chronic | ICD-10-CM

## 2020-11-04 PROCEDURE — 73610 X-RAY EXAM OF ANKLE: CPT

## 2020-11-04 PROCEDURE — 73610 X-RAY EXAM OF ANKLE: CPT | Mod: 26,LT

## 2020-11-06 ENCOUNTER — APPOINTMENT (OUTPATIENT)
Dept: ORTHOPEDIC SURGERY | Facility: CLINIC | Age: 75
End: 2020-11-06
Payer: MEDICARE

## 2020-11-06 ENCOUNTER — APPOINTMENT (OUTPATIENT)
Dept: UROGYNECOLOGY | Facility: CLINIC | Age: 75
End: 2020-11-06
Payer: MEDICARE

## 2020-11-06 VITALS — TEMPERATURE: 97.7 F

## 2020-11-06 VITALS
WEIGHT: 135 LBS | SYSTOLIC BLOOD PRESSURE: 163 MMHG | HEIGHT: 62 IN | DIASTOLIC BLOOD PRESSURE: 69 MMHG | BODY MASS INDEX: 24.84 KG/M2

## 2020-11-06 VITALS
SYSTOLIC BLOOD PRESSURE: 169 MMHG | BODY MASS INDEX: 26.31 KG/M2 | HEIGHT: 62 IN | DIASTOLIC BLOOD PRESSURE: 64 MMHG | WEIGHT: 143 LBS | HEART RATE: 69 BPM

## 2020-11-06 PROCEDURE — 99213 OFFICE O/P EST LOW 20 MIN: CPT | Mod: 25

## 2020-11-06 PROCEDURE — 99203 OFFICE O/P NEW LOW 30 MIN: CPT | Mod: 57

## 2020-11-06 PROCEDURE — 51701 INSERT BLADDER CATHETER: CPT

## 2020-11-06 PROCEDURE — 27788 TREATMENT OF ANKLE FRACTURE: CPT | Mod: LT

## 2020-11-06 RX ORDER — OMEPRAZOLE 40 MG/1
40 CAPSULE, DELAYED RELEASE ORAL DAILY
Qty: 90 | Refills: 3 | Status: DISCONTINUED | COMMUNITY
Start: 2017-10-09 | End: 2020-11-06

## 2020-11-06 NOTE — ASSESSMENT
[FreeTextEntry1] : OAB, hx bladder cancer. Continue urology surviellence. Discussed increasing myrbet dose - will not do due to BPs, f/u after norvasc started and if BPs better control then can consider increasing dosage. For now, stop the myrb. She understood, risks reviewed. SNS vs bladder botox discussed, R/B/A. Will think about SNS, call prn scheduling. All ques answered.

## 2020-11-06 NOTE — HISTORY OF PRESENT ILLNESS
[FreeTextEntry1] : 73 yo P4, PSHx includes TURBT in 2019 for bladder cancer, with OAB-dry, currently s/p PTNS x 10 and on myrbetriq 25 mg. Also doing PT. Feels minimal improvement, rare instances of about 4-5 hours in day without OAB symptoms, however mostly not, with voids q1 hour in day and overnight. No hematuria. Following with cystos and intravesical chemo instillations, next cysto due Jan 2021. No CP/SOB/palpitations/dizziness. Reports home BPs OK, here and at doc visits elevated. Weaning off her BP b2, then starting norvasc soon.

## 2020-11-10 ENCOUNTER — APPOINTMENT (OUTPATIENT)
Dept: UROGYNECOLOGY | Facility: CLINIC | Age: 75
End: 2020-11-10

## 2020-11-10 ENCOUNTER — NON-APPOINTMENT (OUTPATIENT)
Age: 75
End: 2020-11-10

## 2020-11-17 ENCOUNTER — NON-APPOINTMENT (OUTPATIENT)
Age: 75
End: 2020-11-17

## 2020-11-17 ENCOUNTER — APPOINTMENT (OUTPATIENT)
Dept: UROGYNECOLOGY | Facility: CLINIC | Age: 75
End: 2020-11-17

## 2020-11-17 ENCOUNTER — APPOINTMENT (OUTPATIENT)
Dept: UROGYNECOLOGY | Facility: CLINIC | Age: 75
End: 2020-11-17
Payer: MEDICARE

## 2020-11-17 DIAGNOSIS — L08.9 LOCAL INFECTION OF THE SKIN AND SUBCUTANEOUS TISSUE, UNSPECIFIED: ICD-10-CM

## 2020-11-17 PROCEDURE — 99213 OFFICE O/P EST LOW 20 MIN: CPT

## 2020-11-17 NOTE — HISTORY OF PRESENT ILLNESS
[FreeTextEntry1] : 73 yo P4, PSHx includes TURBT in 2019 for bladder cancer, with OAB-dry, failed behavioral modifications, failed PTNS. Currently on myrbetriq 25 mg and doing PT, with minimal improvement of OAB. Voids about q1 hour in day and overnight. No hematuria, no dysuria, no recurrent UTIs. \par \par Sees urology for cysto surveillence and intravesical chemo instillations, next cysto due Jan 2021. No CP/SOB/palpitations/dizziness. Reports home BPs OK, here and at doc visits elevated. Recent alteration in HTN meds.\par \par Reviewed refractory OAB tx options, all options, R/B/A efficacies. Specifically, SNS and bladder botox discussed. She is interested in SNS and declines other management options.\par  \par

## 2020-11-17 NOTE — ASSESSMENT
[FreeTextEntry1] : Risks of SNS including but not limited to radiation exposure, failure, need for further surgery, bleeding, transfusion, hematoma formation, infection to skin or deeper structures such as neuro/spinal infection, lead retention upon removal, migration of lead all discussed. Rx for abx to prevent infection between stage 1 and 2, keeping area dry and clean also reviewed. Consent signed and witnessed, all ques answered. Proceed with Stage 1 and 2, 1-2 weeks apart. All ques answered.

## 2020-11-19 ENCOUNTER — APPOINTMENT (OUTPATIENT)
Dept: ORTHOPEDIC SURGERY | Facility: CLINIC | Age: 75
End: 2020-11-19
Payer: MEDICARE

## 2020-11-19 VITALS
HEIGHT: 62 IN | BODY MASS INDEX: 26.31 KG/M2 | SYSTOLIC BLOOD PRESSURE: 157 MMHG | HEART RATE: 71 BPM | WEIGHT: 143 LBS | TEMPERATURE: 95.8 F | DIASTOLIC BLOOD PRESSURE: 69 MMHG

## 2020-11-19 DIAGNOSIS — J30.9 ALLERGIC RHINITIS, UNSPECIFIED: ICD-10-CM

## 2020-11-19 PROCEDURE — 73610 X-RAY EXAM OF ANKLE: CPT | Mod: LT

## 2020-11-19 PROCEDURE — 99024 POSTOP FOLLOW-UP VISIT: CPT

## 2020-11-19 NOTE — DISCUSSION/SUMMARY
[de-identified] : Assessment: 75-year-old female with left distal fibula fracture\par \par Plan: Overall the patient is recovering well from her left distal fibula fracture.  Her x-rays taken today demonstrated a fracture remains in good position.  She shall remain nonweightbearing of her left lower extremity in her boot at all times with use of crutches for assistance with ambulation.  She may take anti-inflammatories for pain.  She should continue to ice and elevate the ankle to help decrease any residual swelling.  Recommend follow-up in 4 weeks for repeat clinical evaluation.  Patient verbalizes her understanding agrees with the plan.  All questions were answered to her satisfaction.

## 2020-11-19 NOTE — PHYSICAL EXAM
[de-identified] : On exam, the patient is pleasant.  They  are awake, alert, and oriented x3.  Patient is nonweightbearing of the left lower extremity cam boot with use of crutches for assistance with ambulation.\par Weight is appropriate for height\par Full range of motion of cervical spine without instability\par Full range of motion of back without instability\par Intact neurologic, vascular, and dermatologic exam to right and left upper extremities\par Intact neurologic, vascular, and dermatologic exam to right and left lower extremities\par \par Left lower Extremity\par Patient's skin is intact without any abrasions or ulcerations.  There is mild to moderate soft tissue swelling about the ankle which is improved since her previous visit.  She has isolated tenderness palpation over the distal fibula.  No tenderness palpation elsewhere.  EHL/FHL/TA/GS C motor function is intact, sensations intact light touch in L2-S1 distributions, DP and PT pulses are palpable compartments are soft and compressible.  No calf tenderness to palpation. [de-identified] : X-rays including 3 views of the left ankle obtained in the office reviewed the patient today.  Patient's fracture remains well aligned without any significant displacement.

## 2020-11-21 ENCOUNTER — RX RENEWAL (OUTPATIENT)
Age: 75
End: 2020-11-21

## 2020-11-27 ENCOUNTER — OUTPATIENT (OUTPATIENT)
Dept: OUTPATIENT SERVICES | Facility: HOSPITAL | Age: 75
LOS: 1 days | End: 2020-11-27
Payer: MEDICARE

## 2020-11-27 DIAGNOSIS — C67.9 MALIGNANT NEOPLASM OF BLADDER, UNSPECIFIED: Chronic | ICD-10-CM

## 2020-11-27 DIAGNOSIS — Z90.49 ACQUIRED ABSENCE OF OTHER SPECIFIED PARTS OF DIGESTIVE TRACT: Chronic | ICD-10-CM

## 2020-11-27 DIAGNOSIS — Z98.890 OTHER SPECIFIED POSTPROCEDURAL STATES: Chronic | ICD-10-CM

## 2020-11-27 DIAGNOSIS — G56.00 CARPAL TUNNEL SYNDROME, UNSPECIFIED UPPER LIMB: Chronic | ICD-10-CM

## 2020-11-27 DIAGNOSIS — Z01.818 ENCOUNTER FOR OTHER PREPROCEDURAL EXAMINATION: ICD-10-CM

## 2020-11-27 LAB
ALBUMIN SERPL ELPH-MCNC: 4.4 G/DL — SIGNIFICANT CHANGE UP (ref 3.3–5.2)
ALP SERPL-CCNC: 78 U/L — SIGNIFICANT CHANGE UP (ref 40–120)
ALT FLD-CCNC: 33 U/L — HIGH
ANION GAP SERPL CALC-SCNC: 11 MMOL/L — SIGNIFICANT CHANGE UP (ref 5–17)
APPEARANCE UR: CLEAR — SIGNIFICANT CHANGE UP
APTT BLD: 29.2 SEC — SIGNIFICANT CHANGE UP (ref 27.5–35.5)
AST SERPL-CCNC: 24 U/L — SIGNIFICANT CHANGE UP
BACTERIA # UR AUTO: ABNORMAL
BASOPHILS # BLD AUTO: 0.04 K/UL — SIGNIFICANT CHANGE UP (ref 0–0.2)
BASOPHILS NFR BLD AUTO: 0.8 % — SIGNIFICANT CHANGE UP (ref 0–2)
BILIRUB SERPL-MCNC: 0.4 MG/DL — SIGNIFICANT CHANGE UP (ref 0.4–2)
BILIRUB UR-MCNC: NEGATIVE — SIGNIFICANT CHANGE UP
BUN SERPL-MCNC: 35 MG/DL — HIGH (ref 8–20)
CALCIUM SERPL-MCNC: 9.6 MG/DL — SIGNIFICANT CHANGE UP (ref 8.6–10.2)
CHLORIDE SERPL-SCNC: 103 MMOL/L — SIGNIFICANT CHANGE UP (ref 98–107)
CO2 SERPL-SCNC: 25 MMOL/L — SIGNIFICANT CHANGE UP (ref 22–29)
COLOR SPEC: YELLOW — SIGNIFICANT CHANGE UP
CREAT SERPL-MCNC: 1.23 MG/DL — SIGNIFICANT CHANGE UP (ref 0.5–1.3)
DIFF PNL FLD: ABNORMAL
EOSINOPHIL # BLD AUTO: 0.17 K/UL — SIGNIFICANT CHANGE UP (ref 0–0.5)
EOSINOPHIL NFR BLD AUTO: 3.2 % — SIGNIFICANT CHANGE UP (ref 0–6)
EPI CELLS # UR: SIGNIFICANT CHANGE UP
GLUCOSE SERPL-MCNC: 89 MG/DL — SIGNIFICANT CHANGE UP (ref 70–99)
GLUCOSE UR QL: NEGATIVE MG/DL — SIGNIFICANT CHANGE UP
HCT VFR BLD CALC: 38.7 % — SIGNIFICANT CHANGE UP (ref 34.5–45)
HGB BLD-MCNC: 12.9 G/DL — SIGNIFICANT CHANGE UP (ref 11.5–15.5)
IMM GRANULOCYTES NFR BLD AUTO: 0.2 % — SIGNIFICANT CHANGE UP (ref 0–1.5)
INR BLD: 0.98 RATIO — SIGNIFICANT CHANGE UP (ref 0.88–1.16)
KETONES UR-MCNC: NEGATIVE — SIGNIFICANT CHANGE UP
LEUKOCYTE ESTERASE UR-ACNC: ABNORMAL
LYMPHOCYTES # BLD AUTO: 1.23 K/UL — SIGNIFICANT CHANGE UP (ref 1–3.3)
LYMPHOCYTES # BLD AUTO: 23.1 % — SIGNIFICANT CHANGE UP (ref 13–44)
MCHC RBC-ENTMCNC: 30.3 PG — SIGNIFICANT CHANGE UP (ref 27–34)
MCHC RBC-ENTMCNC: 33.3 GM/DL — SIGNIFICANT CHANGE UP (ref 32–36)
MCV RBC AUTO: 90.8 FL — SIGNIFICANT CHANGE UP (ref 80–100)
MONOCYTES # BLD AUTO: 0.45 K/UL — SIGNIFICANT CHANGE UP (ref 0–0.9)
MONOCYTES NFR BLD AUTO: 8.4 % — SIGNIFICANT CHANGE UP (ref 2–14)
NEUTROPHILS # BLD AUTO: 3.43 K/UL — SIGNIFICANT CHANGE UP (ref 1.8–7.4)
NEUTROPHILS NFR BLD AUTO: 64.3 % — SIGNIFICANT CHANGE UP (ref 43–77)
NITRITE UR-MCNC: NEGATIVE — SIGNIFICANT CHANGE UP
PH UR: 6 — SIGNIFICANT CHANGE UP (ref 5–8)
PLATELET # BLD AUTO: 103 K/UL — LOW (ref 150–400)
POTASSIUM SERPL-MCNC: 4.5 MMOL/L — SIGNIFICANT CHANGE UP (ref 3.5–5.3)
POTASSIUM SERPL-SCNC: 4.5 MMOL/L — SIGNIFICANT CHANGE UP (ref 3.5–5.3)
PROT SERPL-MCNC: 7.7 G/DL — SIGNIFICANT CHANGE UP (ref 6.6–8.7)
PROT UR-MCNC: 30 MG/DL
PROTHROM AB SERPL-ACNC: 11.4 SEC — SIGNIFICANT CHANGE UP (ref 10.6–13.6)
RBC # BLD: 4.26 M/UL — SIGNIFICANT CHANGE UP (ref 3.8–5.2)
RBC # FLD: 12.2 % — SIGNIFICANT CHANGE UP (ref 10.3–14.5)
RBC CASTS # UR COMP ASSIST: ABNORMAL /HPF (ref 0–4)
SODIUM SERPL-SCNC: 139 MMOL/L — SIGNIFICANT CHANGE UP (ref 135–145)
SP GR SPEC: 1.02 — SIGNIFICANT CHANGE UP (ref 1.01–1.02)
UROBILINOGEN FLD QL: NEGATIVE MG/DL — SIGNIFICANT CHANGE UP
WBC # BLD: 5.33 K/UL — SIGNIFICANT CHANGE UP (ref 3.8–10.5)
WBC # FLD AUTO: 5.33 K/UL — SIGNIFICANT CHANGE UP (ref 3.8–10.5)
WBC UR QL: SIGNIFICANT CHANGE UP

## 2020-11-27 PROCEDURE — G0463: CPT

## 2020-11-28 LAB
CULTURE RESULTS: SIGNIFICANT CHANGE UP
SPECIMEN SOURCE: SIGNIFICANT CHANGE UP

## 2020-12-04 ENCOUNTER — APPOINTMENT (OUTPATIENT)
Dept: INTERNAL MEDICINE | Facility: CLINIC | Age: 75
End: 2020-12-04
Payer: MEDICARE

## 2020-12-04 ENCOUNTER — LABORATORY RESULT (OUTPATIENT)
Age: 75
End: 2020-12-04

## 2020-12-04 VITALS
SYSTOLIC BLOOD PRESSURE: 158 MMHG | BODY MASS INDEX: 26.31 KG/M2 | DIASTOLIC BLOOD PRESSURE: 80 MMHG | HEIGHT: 62 IN | WEIGHT: 143 LBS | TEMPERATURE: 98 F

## 2020-12-04 DIAGNOSIS — D69.6 THROMBOCYTOPENIA, UNSPECIFIED: ICD-10-CM

## 2020-12-04 PROCEDURE — 99215 OFFICE O/P EST HI 40 MIN: CPT | Mod: 25

## 2020-12-04 PROCEDURE — 36415 COLL VENOUS BLD VENIPUNCTURE: CPT

## 2020-12-04 RX ORDER — METOPROLOL SUCCINATE 50 MG/1
50 TABLET, EXTENDED RELEASE ORAL
Refills: 0 | Status: DISCONTINUED | COMMUNITY
End: 2020-12-04

## 2020-12-06 ENCOUNTER — APPOINTMENT (OUTPATIENT)
Dept: DISASTER EMERGENCY | Facility: CLINIC | Age: 75
End: 2020-12-06

## 2020-12-07 LAB
BASOPHILS # BLD AUTO: 0.02 K/UL
BASOPHILS NFR BLD AUTO: 0.4 %
EOSINOPHIL # BLD AUTO: 0.16 K/UL
EOSINOPHIL NFR BLD AUTO: 2.9 %
HCT VFR BLD CALC: 36 %
HGB BLD-MCNC: 12.2 G/DL
IMM GRANULOCYTES NFR BLD AUTO: 0.2 %
LYMPHOCYTES # BLD AUTO: 1.09 K/UL
LYMPHOCYTES NFR BLD AUTO: 19.5 %
MAN DIFF?: NORMAL
MCHC RBC-ENTMCNC: 30.4 PG
MCHC RBC-ENTMCNC: 33.9 GM/DL
MCV RBC AUTO: 89.8 FL
MONOCYTES # BLD AUTO: 0.47 K/UL
MONOCYTES NFR BLD AUTO: 8.4 %
NEUTROPHILS # BLD AUTO: 3.85 K/UL
NEUTROPHILS NFR BLD AUTO: 68.6 %
PLATELET # BLD AUTO: NORMAL K/UL
RBC # BLD: 4.01 M/UL
RBC # FLD: 12.4 %
SARS-COV-2 N GENE NPH QL NAA+PROBE: NOT DETECTED
WBC # FLD AUTO: 5.6 K/UL

## 2020-12-07 NOTE — PHYSICAL EXAM
[No Acute Distress] : no acute distress [Well Nourished] : well nourished [Well Developed] : well developed [Well-Appearing] : well-appearing [Normal Sclera/Conjunctiva] : normal sclera/conjunctiva [PERRL] : pupils equal round and reactive to light [EOMI] : extraocular movements intact [Normal Outer Ear/Nose] : the outer ears and nose were normal in appearance [Normal Oropharynx] : the oropharynx was normal [No JVD] : no jugular venous distention [No Lymphadenopathy] : no lymphadenopathy [Supple] : supple [Thyroid Normal, No Nodules] : the thyroid was normal and there were no nodules present [No Respiratory Distress] : no respiratory distress  [No Accessory Muscle Use] : no accessory muscle use [Clear to Auscultation] : lungs were clear to auscultation bilaterally [Normal Rate] : normal rate  [Regular Rhythm] : with a regular rhythm [Normal S1, S2] : normal S1 and S2 [No Murmur] : no murmur heard [No Carotid Bruits] : no carotid bruits [No Abdominal Bruit] : a ~M bruit was not heard ~T in the abdomen [No Varicosities] : no varicosities [Pedal Pulses Present] : the pedal pulses are present [No Edema] : there was no peripheral edema [No Palpable Aorta] : no palpable aorta [No Extremity Clubbing/Cyanosis] : no extremity clubbing/cyanosis [Soft] : abdomen soft [Non Tender] : non-tender [Non-distended] : non-distended [No Masses] : no abdominal mass palpated [No HSM] : no HSM [Normal Bowel Sounds] : normal bowel sounds [Normal Posterior Cervical Nodes] : no posterior cervical lymphadenopathy [Normal Anterior Cervical Nodes] : no anterior cervical lymphadenopathy [No CVA Tenderness] : no CVA  tenderness [No Spinal Tenderness] : no spinal tenderness [No Joint Swelling] : no joint swelling [Grossly Normal Strength/Tone] : grossly normal strength/tone [No Rash] : no rash [Coordination Grossly Intact] : coordination grossly intact [No Focal Deficits] : no focal deficits [Normal Gait] : normal gait [Normal Affect] : the affect was normal [Normal Insight/Judgement] : insight and judgment were intact [de-identified] : LEFT LEG  IN A BOOT

## 2020-12-07 NOTE — PLAN
[FreeTextEntry1] : 4yo  FEMALE WITH HX HTN CHOL AND BLADDER CANCER FOR MED CLEARANCE FOR  SACRAL NEUROMODULATOR\par PT NORMAL EXAM \par LABS WITH LOW PLATELETS\par EKG NSR LEFT FOOT IN BOOT FORM RECENT TIBIAL FRACTURE\par PT WITH LOW PLATELETS BUT MAYBE MED RELATED AND NOT DANGEROUSLY LOW\par I WILL REPEAT  A STAT CBC BUT I HAVE SPOKEN TO HEMATOLOGY AND THEY FEEL THAT SHE IS OK FORM HEM STANDPOINT IF PLATELETS ARE OVER 100K\par MEDICALLY CLEAR FOR PROCEDURE FROM  MEDICAL  STANDPOINT \par WILL D/W  URO GYN \par \par ADDENDUM CBC WITH PLT CLUMPS\par REPEAT CBC TODAY 12/7 \par PT IS  MEDICALLY CLEAR FOR PROCEDURE

## 2020-12-07 NOTE — REVIEW OF SYSTEMS
[Dysuria] : dysuria [Frequency] : frequency [Negative] : Heme/Lymph [FreeTextEntry9] : LEF TIBIAL FX

## 2020-12-07 NOTE — HISTORY OF PRESENT ILLNESS
[No Pertinent Cardiac History] : no history of aortic stenosis, atrial fibrillation, coronary artery disease, recent myocardial infarction, or implantable device/pacemaker [No Pertinent Pulmonary History] : no history of asthma, COPD, sleep apnea, or smoking [No Adverse Anesthesia Reaction] : no adverse anesthesia reaction in self or family member [NSAIDs: _____] : NSAIDs: [unfilled] [FreeTextEntry4] : 74yo  FEMALE WITH HX HTN CHOL AND BLADDER CANCER FOR MED CLEARANCE FOR  SACRAL NEUROMODULATOR

## 2020-12-09 ENCOUNTER — APPOINTMENT (OUTPATIENT)
Dept: UROGYNECOLOGY | Facility: AMBULATORY SURGERY CENTER | Age: 75
End: 2020-12-09
Payer: MEDICARE

## 2020-12-09 PROCEDURE — 76000 FLUOROSCOPY <1 HR PHYS/QHP: CPT | Mod: 26

## 2020-12-09 PROCEDURE — 64581 OPN IMPLTJ NEA SACRAL NERVE: CPT

## 2020-12-10 ENCOUNTER — APPOINTMENT (OUTPATIENT)
Dept: UROGYNECOLOGY | Facility: CLINIC | Age: 75
End: 2020-12-10
Payer: MEDICARE

## 2020-12-10 PROCEDURE — 99024 POSTOP FOLLOW-UP VISIT: CPT

## 2020-12-10 NOTE — DISCUSSION/SUMMARY
[FreeTextEntry1] : feels well, had an issue with impedance and rep came to correct, no issues now \par f/u as scheduled

## 2020-12-11 ENCOUNTER — APPOINTMENT (OUTPATIENT)
Dept: ORTHOPEDIC SURGERY | Facility: CLINIC | Age: 75
End: 2020-12-11
Payer: MEDICARE

## 2020-12-11 VITALS
HEART RATE: 72 BPM | BODY MASS INDEX: 25.76 KG/M2 | DIASTOLIC BLOOD PRESSURE: 71 MMHG | WEIGHT: 140 LBS | SYSTOLIC BLOOD PRESSURE: 143 MMHG | HEIGHT: 62 IN

## 2020-12-11 PROCEDURE — 99024 POSTOP FOLLOW-UP VISIT: CPT

## 2020-12-11 NOTE — PHYSICAL EXAM
[de-identified] : On exam, the patient is pleasant.  They  are awake, alert, and oriented x3.  Patient is nonweightbearing of the left lower extremity cam boot with use of crutches for assistance with ambulation.\par Weight is appropriate for height\par Full range of motion of cervical spine without instability\par Full range of motion of back without instability\par Intact neurologic, vascular, and dermatologic exam to right and left upper extremities\par Intact neurologic, vascular, and dermatologic exam to right and left lower extremities\par \par Left lower Extremity\par Patient's skin is intact without any abrasions or ulcerations.  There is mild to moderate soft tissue swelling about the ankle which is improved since her previous visit.  She has mild tenderness palpation over the distal fibula which is improving.  No tenderness palpation elsewhere.  EHL/FHL/TA/GS C motor function is intact, sensations intact light touch in L2-S1 distributions, DP and PT pulses are palpable compartments are soft and compressible.  No calf tenderness to palpation. [de-identified] : X-rays including 3 views of the left ankle were obtained today in the office and reviewed the patient.  There is interval healing of her distal fibula fracture with lateral bridging callus formation.  No displacement of the fracture is appreciated from previous films.  The fracture remains in overall very good alignment.

## 2020-12-11 NOTE — DISCUSSION/SUMMARY
[de-identified] : Assessment: 75-year-old female with a left distal fibula fracture\par \par Plan: Overall the patient is doing well with nonsurgical care for her injury.  She is approximately 5 weeks out from her injury at this point.  I recommend remaining nonweightbearing on crutches for 1 additional week.  At that time she may begin to gradually bear weight as tolerated and wean off of her crutches while remaining in her boot for 2 additional weeks.  At that time she may discontinue the use of the boot to begin bearing weight as tolerated.  Recommend follow-up in 4 weeks for repeat clinical and radiographic evaluation.  Patient verbalizes understanding agrees with plan.  All questions were answered to her satisfaction.

## 2020-12-11 NOTE — HISTORY OF PRESENT ILLNESS
[de-identified] : Carmen is a pleasant 75-year-old female who returns the office today for follow-up of her left ankle fracture.  The patient states that she has been doing well and she no longer has pain at this point.  She has been wearing her boot most of the time but reports occasionally bearing weight on her left lower extremity despite my recommendation to remain nonweightbearing.  Overall though she feels well and has had no issues since the injury.  The patient denies any fevers, chills, sweats, recent illnesses, numbness, tingling, weakness, or pain elsewhere at this time.

## 2020-12-12 ENCOUNTER — APPOINTMENT (OUTPATIENT)
Dept: DISASTER EMERGENCY | Facility: CLINIC | Age: 75
End: 2020-12-12

## 2020-12-14 LAB — SARS-COV-2 N GENE NPH QL NAA+PROBE: NOT DETECTED

## 2020-12-15 ENCOUNTER — APPOINTMENT (OUTPATIENT)
Dept: UROGYNECOLOGY | Facility: AMBULATORY SURGERY CENTER | Age: 75
End: 2020-12-15
Payer: MEDICARE

## 2020-12-15 PROCEDURE — 64590 INS/RPL PRPH SAC/GSTR NPG/R: CPT | Mod: 78

## 2020-12-23 ENCOUNTER — APPOINTMENT (OUTPATIENT)
Dept: UROGYNECOLOGY | Facility: CLINIC | Age: 75
End: 2020-12-23
Payer: MEDICARE

## 2020-12-23 PROCEDURE — 99024 POSTOP FOLLOW-UP VISIT: CPT

## 2020-12-23 NOTE — DISCUSSION/SUMMARY
[FreeTextEntry1] : 10 days postop s/p SNS right-sided lead and pocket. Incisions healed well, no evid of infection. Precautions reviewed. Programming done. RTO in 6 weeks or prn.

## 2020-12-23 NOTE — SUBJECTIVE
[FreeTextEntry1] : no fever/chills, no incisional discharge or bleeding [FreeTextEntry7] : none [FreeTextEntry6] : no N/V [FreeTextEntry5] : daytime control excellent, nocturia improved but still present, no dysuria, no hematuria, no incomplete emptying [FreeTextEntry4] : normal without straining [FreeTextEntry9] : device on, low amplitude, rep resent who made programming adjustments

## 2020-12-30 ENCOUNTER — APPOINTMENT (OUTPATIENT)
Dept: UROGYNECOLOGY | Facility: CLINIC | Age: 75
End: 2020-12-30

## 2021-01-06 ENCOUNTER — APPOINTMENT (OUTPATIENT)
Dept: UROLOGY | Facility: CLINIC | Age: 76
End: 2021-01-06
Payer: MEDICARE

## 2021-01-06 VITALS
DIASTOLIC BLOOD PRESSURE: 124 MMHG | RESPIRATION RATE: 16 BRPM | TEMPERATURE: 97.2 F | HEART RATE: 73 BPM | SYSTOLIC BLOOD PRESSURE: 170 MMHG

## 2021-01-06 LAB
BILIRUB UR QL STRIP: NORMAL
CLARITY UR: CLEAR
COLLECTION METHOD: NORMAL
GLUCOSE UR-MCNC: NEGATIVE
HCG UR QL: 0.2 EU/DL
HGB UR QL STRIP.AUTO: NEGATIVE
KETONES UR-MCNC: NORMAL
LEUKOCYTE ESTERASE UR QL STRIP: NORMAL
NITRITE UR QL STRIP: NEGATIVE
PH UR STRIP: 6.5
PROT UR STRIP-MCNC: 30
SP GR UR STRIP: 1.02

## 2021-01-06 PROCEDURE — 81003 URINALYSIS AUTO W/O SCOPE: CPT | Mod: QW

## 2021-01-06 PROCEDURE — 52000 CYSTOURETHROSCOPY: CPT

## 2021-01-08 LAB — URINE CYTOLOGY: NORMAL

## 2021-01-13 ENCOUNTER — APPOINTMENT (OUTPATIENT)
Dept: ORTHOPEDIC SURGERY | Facility: CLINIC | Age: 76
End: 2021-01-13
Payer: MEDICARE

## 2021-01-13 VITALS
DIASTOLIC BLOOD PRESSURE: 79 MMHG | HEIGHT: 62 IN | SYSTOLIC BLOOD PRESSURE: 144 MMHG | HEART RATE: 71 BPM | BODY MASS INDEX: 25.76 KG/M2 | WEIGHT: 140 LBS

## 2021-01-13 DIAGNOSIS — S82.892A OTHER FRACTURE OF LEFT LOWER LEG, INITIAL ENCOUNTER FOR CLOSED FRACTURE: ICD-10-CM

## 2021-01-13 PROCEDURE — 99024 POSTOP FOLLOW-UP VISIT: CPT

## 2021-01-13 PROCEDURE — 73610 X-RAY EXAM OF ANKLE: CPT | Mod: LT

## 2021-01-13 NOTE — DISCUSSION/SUMMARY
[de-identified] : Assessment: 75-year-old female with a left heel distal fibula fracture\par \par Plan: Overall the patient has done well with nonsurgical treatment of her ankle fracture.  She may now return to all activities without restriction as tolerated.  She may take anti-inflammatories as needed for pain.  She can also continue to ice and elevate the ankle for residual swelling which can linger for several months.  The patient may follow-up with me on an as-needed basis at this point.  The patient verbalizes understanding and agrees with plan.  All questions were answered to her satisfaction.

## 2021-01-13 NOTE — PHYSICAL EXAM
[de-identified] : On exam, the patient is pleasant.  They  are awake, alert, and oriented x3.  Patient is nonweightbearing of the left lower extremity cam boot with use of crutches for assistance with ambulation.\par Weight is appropriate for height\par Full range of motion of cervical spine without instability\par Full range of motion of back without instability\par Intact neurologic, vascular, and dermatologic exam to right and left upper extremities\par Intact neurologic, vascular, and dermatologic exam to right and left lower extremities\par \par Left lower Extremity\par Patient's skin is intact without any abrasions or ulcerations.  There is mild soft tissue swelling about the ankle which is improved since her previous visit.  She has no tenderness palpation over the distal fibula.  No tenderness palpation elsewhere.  EHL/FHL/TA/GS C motor function is intact, sensations intact light touch in L2-S1 distributions, DP and PT pulses are palpable compartments are soft and compressible.  No calf tenderness to palpation. [de-identified] : X-rays including 3 views of the left ankle were obtained in the office and reviewed the patient today.  There is a well-healed distal fibula fracture with abundant callus formation.  The ankle mortise remains congruent.

## 2021-01-13 NOTE — HISTORY OF PRESENT ILLNESS
[0] : a maximum pain level of 0/10 [de-identified] : Carmen is a 75 year old Female who comes in today for follow up appointment. Patient was last seen on 11/11/2020, where it was discussed that she would discontinue use of her crutches, and walking boot, one and two weeks after being seen last, and continue with conservative treatment. Carmen states that she does not have any pain, with her current pain levels at 0/10, rarely dull-achy in nature. She denies any new injury or trauma, and denies any numbness, tingling, or weakness in upper/lower extremities. Overall she feels well and has had no issues since the injury. The patient denies any fevers, chills, sweats, recent illnesses, numbness, tingling, weakness, or pain elsewhere at this time.

## 2021-01-13 NOTE — ADDENDUM
[FreeTextEntry1] : [employer] ATC, assisted with the history and documentation for Dr. Ndiaye on this date 01/13/2021\par

## 2021-01-14 ENCOUNTER — APPOINTMENT (OUTPATIENT)
Dept: UROLOGY | Facility: CLINIC | Age: 76
End: 2021-01-14
Payer: MEDICARE

## 2021-01-14 VITALS
DIASTOLIC BLOOD PRESSURE: 97 MMHG | RESPIRATION RATE: 15 BRPM | SYSTOLIC BLOOD PRESSURE: 160 MMHG | TEMPERATURE: 98.1 F | HEART RATE: 73 BPM

## 2021-01-14 LAB
BILIRUB UR QL STRIP: ABNORMAL
CLARITY UR: CLEAR
COLLECTION METHOD: NORMAL
GLUCOSE UR-MCNC: NEGATIVE
HCG UR QL: 0.2 EU/DL
HGB UR QL STRIP.AUTO: ABNORMAL
KETONES UR-MCNC: ABNORMAL
LEUKOCYTE ESTERASE UR QL STRIP: ABNORMAL
NITRITE UR QL STRIP: NEGATIVE
PH UR STRIP: 5.5
PROT UR STRIP-MCNC: 100
SP GR UR STRIP: >=1.03

## 2021-01-14 PROCEDURE — 51720 TREATMENT OF BLADDER LESION: CPT

## 2021-01-14 PROCEDURE — 81003 URINALYSIS AUTO W/O SCOPE: CPT | Mod: QW

## 2021-01-14 RX ORDER — GEMCITABINE HYDROCHLORIDE 200 MG/5.26ML
200 INJECTION INTRAVENOUS
Refills: 0 | Status: COMPLETED | OUTPATIENT
Start: 2021-01-14

## 2021-01-14 RX ADMIN — GEMCITABINE HYDROCHLORIDE 0 MG/5.26ML: 1 INJECTION, POWDER, LYOPHILIZED, FOR SOLUTION INTRAVENOUS at 00:00

## 2021-01-21 ENCOUNTER — APPOINTMENT (OUTPATIENT)
Dept: UROLOGY | Facility: CLINIC | Age: 76
End: 2021-01-21
Payer: MEDICARE

## 2021-01-21 VITALS
BODY MASS INDEX: 25.76 KG/M2 | WEIGHT: 140 LBS | HEIGHT: 62 IN | DIASTOLIC BLOOD PRESSURE: 69 MMHG | HEART RATE: 65 BPM | TEMPERATURE: 96.4 F | SYSTOLIC BLOOD PRESSURE: 159 MMHG

## 2021-01-21 LAB
BILIRUB UR QL STRIP: NORMAL
CLARITY UR: CLEAR
COLLECTION METHOD: NORMAL
GLUCOSE UR-MCNC: NORMAL
HCG UR QL: 0.2 EU/DL
HGB UR QL STRIP.AUTO: NORMAL
KETONES UR-MCNC: NORMAL
LEUKOCYTE ESTERASE UR QL STRIP: NORMAL
NITRITE UR QL STRIP: NORMAL
PH UR STRIP: 5.5
PROT UR STRIP-MCNC: ABNORMAL
SP GR UR STRIP: 1.02

## 2021-01-21 PROCEDURE — 81003 URINALYSIS AUTO W/O SCOPE: CPT | Mod: QW

## 2021-01-21 PROCEDURE — 51720 TREATMENT OF BLADDER LESION: CPT

## 2021-01-21 RX ORDER — GEMCITABINE 2 G/50ML
2 INJECTION, POWDER, LYOPHILIZED, FOR SOLUTION INTRAVENOUS
Qty: 1 | Refills: 0 | Status: COMPLETED | OUTPATIENT
Start: 2021-01-21

## 2021-01-21 RX ORDER — GEMCITABINE HYDROCHLORIDE 2 G/52.6ML
2 INJECTION INTRAVENOUS
Qty: 1 | Refills: 0 | Status: COMPLETED | COMMUNITY
Start: 2021-01-14 | End: 2021-01-14

## 2021-01-21 RX ORDER — CEPHALEXIN 250 MG/1
250 CAPSULE ORAL
Qty: 20 | Refills: 0 | Status: DISCONTINUED | COMMUNITY
Start: 2020-11-17 | End: 2021-01-21

## 2021-01-28 ENCOUNTER — APPOINTMENT (OUTPATIENT)
Dept: UROLOGY | Facility: CLINIC | Age: 76
End: 2021-01-28
Payer: MEDICARE

## 2021-01-28 VITALS
DIASTOLIC BLOOD PRESSURE: 73 MMHG | RESPIRATION RATE: 15 BRPM | HEART RATE: 69 BPM | HEIGHT: 62 IN | SYSTOLIC BLOOD PRESSURE: 180 MMHG | TEMPERATURE: 97.4 F

## 2021-01-28 LAB
BILIRUB UR QL STRIP: ABNORMAL
CLARITY UR: CLEAR
COLLECTION METHOD: NORMAL
GLUCOSE UR-MCNC: NORMAL
HCG UR QL: 0.2 EU/DL
HGB UR QL STRIP.AUTO: NORMAL
KETONES UR-MCNC: ABNORMAL
LEUKOCYTE ESTERASE UR QL STRIP: NORMAL
NITRITE UR QL STRIP: NORMAL
PH UR STRIP: 5.5
PROT UR STRIP-MCNC: ABNORMAL
SP GR UR STRIP: 1.03

## 2021-01-28 PROCEDURE — 51720 TREATMENT OF BLADDER LESION: CPT

## 2021-01-28 PROCEDURE — 81003 URINALYSIS AUTO W/O SCOPE: CPT | Mod: QW

## 2021-01-28 RX ORDER — GEMCITABINE HYDROCHLORIDE 2 G/52.6ML
2 INJECTION INTRAVENOUS
Refills: 0 | Status: COMPLETED | COMMUNITY
Start: 2021-01-28 | End: 2021-01-28

## 2021-01-28 RX ORDER — GEMCITABINE HYDROCHLORIDE 2 G/52.6ML
2 INJECTION INTRAVENOUS
Refills: 0 | Status: COMPLETED | OUTPATIENT
Start: 2021-01-28

## 2021-01-28 RX ADMIN — Medication 0 GM/52.6ML: at 00:00

## 2021-02-05 ENCOUNTER — APPOINTMENT (OUTPATIENT)
Dept: UROGYNECOLOGY | Facility: CLINIC | Age: 76
End: 2021-02-05
Payer: MEDICARE

## 2021-02-05 DIAGNOSIS — Z85.9 PERSONAL HISTORY OF MALIGNANT NEOPLASM, UNSPECIFIED: ICD-10-CM

## 2021-02-05 DIAGNOSIS — R39.89 OTHER SYMPTOMS AND SIGNS INVOLVING THE GENITOURINARY SYSTEM: ICD-10-CM

## 2021-02-05 PROCEDURE — 51798 US URINE CAPACITY MEASURE: CPT

## 2021-02-05 PROCEDURE — 99024 POSTOP FOLLOW-UP VISIT: CPT

## 2021-03-02 ENCOUNTER — APPOINTMENT (OUTPATIENT)
Dept: UROGYNECOLOGY | Facility: CLINIC | Age: 76
End: 2021-03-02
Payer: MEDICARE

## 2021-03-02 DIAGNOSIS — Z98.890 OTHER SPECIFIED POSTPROCEDURAL STATES: ICD-10-CM

## 2021-03-02 DIAGNOSIS — R39.15 URGENCY OF URINATION: ICD-10-CM

## 2021-03-02 DIAGNOSIS — R32 UNSPECIFIED URINARY INCONTINENCE: ICD-10-CM

## 2021-03-02 DIAGNOSIS — R35.0 FREQUENCY OF MICTURITION: ICD-10-CM

## 2021-03-02 PROCEDURE — 99024 POSTOP FOLLOW-UP VISIT: CPT

## 2021-03-02 PROCEDURE — 99213 OFFICE O/P EST LOW 20 MIN: CPT | Mod: 24

## 2021-03-02 NOTE — DISCUSSION/SUMMARY
[FreeTextEntry1] : 2.5 months s/p right-sided SNS placement with axonics, doing well in daytime, continues to have nocturia and nocturnal polyuria. Has an appt with endo next week. Reprogrammed today with rep present from company in hopes of avoiding muscle effects of the device. She is aware how to turn it off in the case of pain or if needed right away. All ques answered.

## 2021-03-02 NOTE — SUBJECTIVE
[FreeTextEntry1] : happy with device as her daytime OAB is normal and completely cured, however nocturia every 1 hour is unchanged and bothersome, she is losing hope. +sensation in perineum from axonics, turns it down to amp around 1 overnight due to discomfort with sleeping on side. She stays prone for this reason. During day, amp up to 3. No leakage, no dysuria,n o hematuria, no incomplete emptying. Recently did her 3 BIs with Gemcitibine for bladder cancer followup, will have cysto in 3 months with Dr. Sandhu.  [FreeTextEntry6] : normal no N/V [FreeTextEntry2] : disrupted due to freq

## 2021-03-02 NOTE — SUBJECTIVE
[FreeTextEntry1] : continues to have good daytime freq control, however nocturia is an issue, reports nocturnal polyuria as well [FreeTextEntry7] : when amplitude raised, feels muscle tightening in gluteal region on right side - not painful, but no tingling or vibration sensation, and this muscle tightening makes it difficult to sleep or move or sit on the area [FreeTextEntry9] : no fever/chills

## 2021-03-02 NOTE — OBJECTIVE
[Post Void Residual ____ ml] : Post Void Residual was [unfilled] ml [FreeTextEntry2] : back incisions healed [Clean, Dry, Intact] : Clean, Dry, Intact

## 2021-03-02 NOTE — DISCUSSION/SUMMARY
[FreeTextEntry1] : 6 weeks postop from 12/15/20: Axonics SNS right-sided lead and pocket. Good control in day. Nocturia qhour not improved. Device seems to be functioning in position as sensation correct and in correct location, no numbness or weakness, no sharp or shooting pain. She continues on myrb 25 mg and would like to continue. She reports BPs have been 130-140/70s at home and OK, PVR 0 ml bladder scan here today. We discussed behavioral training and PT - failed before, changing meds. With her BIs for bladder cancer, I do not feel bladder botox injections are an option. She will inquire regarding nocturnal polyuria, as she is not overhydrating in the day and not taking in fluids for hours before bed. Reports each overnight void about 50 cc. She will call [employer] rep and we will consider trying a new program and reassess. RTO q6 month or sooner prn, and for programming. All ques answered.\par \par She will also see her Gyn for endometrial polyp surveillence. \par \par She asked for email correspondence to [email or web address] if I come across any information that may be useful to her. We discussed nocturnal polyuria and potential known comorbidities that can be related such as restless leg syndrome, MADELINE, CHF, DM; however she does not have any of these conditions. Her PMHx consists of HTN and high chol, and bladder cancer surveillence treatments. I will reach out to her prn. [Post-Op instructions given. Pt/family verbalizes understanding] : post-operative instructions were provided to the patient/family who verbalize understanding [Risks/Benefits discussed. Pt/family verbalizes understanding] : risks and benefits of the procedure were discussed with the patient/family who verbalize understanding

## 2021-04-28 ENCOUNTER — APPOINTMENT (OUTPATIENT)
Dept: UROLOGY | Facility: CLINIC | Age: 76
End: 2021-04-28
Payer: MEDICARE

## 2021-04-28 VITALS — TEMPERATURE: 98.5 F | SYSTOLIC BLOOD PRESSURE: 189 MMHG | HEART RATE: 68 BPM | DIASTOLIC BLOOD PRESSURE: 78 MMHG

## 2021-04-28 PROCEDURE — 52000 CYSTOURETHROSCOPY: CPT

## 2021-07-28 ENCOUNTER — APPOINTMENT (OUTPATIENT)
Dept: UROLOGY | Facility: CLINIC | Age: 76
End: 2021-07-28
Payer: MEDICARE

## 2021-07-28 VITALS
HEIGHT: 62 IN | DIASTOLIC BLOOD PRESSURE: 72 MMHG | SYSTOLIC BLOOD PRESSURE: 208 MMHG | HEART RATE: 61 BPM | WEIGHT: 140 LBS | BODY MASS INDEX: 25.76 KG/M2

## 2021-07-28 PROCEDURE — 52000 CYSTOURETHROSCOPY: CPT

## 2021-08-09 ENCOUNTER — RESULT CHARGE (OUTPATIENT)
Age: 76
End: 2021-08-09

## 2021-08-09 ENCOUNTER — APPOINTMENT (OUTPATIENT)
Dept: UROLOGY | Facility: CLINIC | Age: 76
End: 2021-08-09
Payer: MEDICARE

## 2021-08-09 VITALS — SYSTOLIC BLOOD PRESSURE: 165 MMHG | HEART RATE: 62 BPM | DIASTOLIC BLOOD PRESSURE: 74 MMHG

## 2021-08-09 LAB
BILIRUB UR QL STRIP: NEGATIVE
GLUCOSE UR-MCNC: NEGATIVE
HCG UR QL: 0.2 EU/DL
HGB UR QL STRIP.AUTO: ABNORMAL
KETONES UR-MCNC: NEGATIVE
LEUKOCYTE ESTERASE UR QL STRIP: NEGATIVE
NITRITE UR QL STRIP: NEGATIVE
PH UR STRIP: 7
PROT UR STRIP-MCNC: NEGATIVE
SP GR UR STRIP: 1.02

## 2021-08-09 PROCEDURE — 51720 TREATMENT OF BLADDER LESION: CPT

## 2021-08-09 RX ORDER — GEMCITABINE HYDROCHLORIDE 2 G/52.6ML
2 INJECTION INTRAVENOUS
Refills: 0 | Status: COMPLETED | OUTPATIENT
Start: 2021-08-09

## 2021-08-09 RX ORDER — GEMCITABINE HYDROCHLORIDE 2 G/52.6ML
2 INJECTION INTRAVENOUS
Qty: 2 | Refills: 2 | Status: COMPLETED | COMMUNITY
Start: 2021-08-09 | End: 2021-08-09

## 2021-08-09 RX ADMIN — Medication 0 GM/52.6ML: at 00:00

## 2021-08-16 ENCOUNTER — APPOINTMENT (OUTPATIENT)
Dept: UROLOGY | Facility: CLINIC | Age: 76
End: 2021-08-16
Payer: MEDICARE

## 2021-08-16 VITALS — SYSTOLIC BLOOD PRESSURE: 129 MMHG | DIASTOLIC BLOOD PRESSURE: 89 MMHG

## 2021-08-16 LAB
BILIRUB UR QL STRIP: ABNORMAL
GLUCOSE UR-MCNC: NEGATIVE
HCG UR QL: 0.2 EU/DL
HGB UR QL STRIP.AUTO: NEGATIVE
KETONES UR-MCNC: NEGATIVE
LEUKOCYTE ESTERASE UR QL STRIP: NORMAL
NITRITE UR QL STRIP: NEGATIVE
PH UR STRIP: 5.5
PROT UR STRIP-MCNC: ABNORMAL
SP GR UR STRIP: 1.03

## 2021-08-16 PROCEDURE — 51720 TREATMENT OF BLADDER LESION: CPT

## 2021-08-16 RX ORDER — GEMCITABINE HYDROCHLORIDE 2 G/52.6ML
2 INJECTION INTRAVENOUS
Qty: 1 | Refills: 0 | Status: COMPLETED | COMMUNITY
Start: 2021-08-16 | End: 2021-08-16

## 2021-08-16 RX ORDER — GEMCITABINE HYDROCHLORIDE 200 MG/5.26ML
200 INJECTION INTRAVENOUS
Refills: 0 | Status: COMPLETED | OUTPATIENT
Start: 2021-08-16

## 2021-08-16 RX ADMIN — GEMCITABINE HYDROCHLORIDE 0 MG/5.26ML: 1 INJECTION, POWDER, LYOPHILIZED, FOR SOLUTION INTRAVENOUS at 00:00

## 2021-08-23 ENCOUNTER — RESULT CHARGE (OUTPATIENT)
Age: 76
End: 2021-08-23

## 2021-08-23 ENCOUNTER — APPOINTMENT (OUTPATIENT)
Dept: UROLOGY | Facility: CLINIC | Age: 76
End: 2021-08-23
Payer: MEDICARE

## 2021-08-23 VITALS
DIASTOLIC BLOOD PRESSURE: 107 MMHG | BODY MASS INDEX: 25.76 KG/M2 | SYSTOLIC BLOOD PRESSURE: 153 MMHG | HEIGHT: 62 IN | WEIGHT: 140 LBS | HEART RATE: 66 BPM

## 2021-08-23 LAB
BILIRUB UR QL STRIP: NORMAL
CLARITY UR: CLEAR
COLLECTION METHOD: NORMAL
GLUCOSE UR-MCNC: NORMAL
HCG UR QL: 0.2 EU/DL
HGB UR QL STRIP.AUTO: NORMAL
KETONES UR-MCNC: NORMAL
LEUKOCYTE ESTERASE UR QL STRIP: NORMAL
NITRITE UR QL STRIP: NORMAL
PH UR STRIP: 7
PROT UR STRIP-MCNC: NORMAL
SP GR UR STRIP: 1.02

## 2021-08-23 PROCEDURE — 51720 TREATMENT OF BLADDER LESION: CPT

## 2021-08-23 RX ORDER — DESMOPRESSIN ACETATE 0.1 MG/1
0.1 TABLET ORAL
Qty: 30 | Refills: 0 | Status: DISCONTINUED | COMMUNITY
Start: 2021-05-17

## 2021-08-23 RX ORDER — GEMCITABINE HYDROCHLORIDE 2 G/52.6ML
2 INJECTION INTRAVENOUS
Refills: 0 | Status: COMPLETED | OUTPATIENT
Start: 2021-08-23

## 2021-08-23 RX ORDER — MIRABEGRON 25 MG/1
25 TABLET, FILM COATED, EXTENDED RELEASE ORAL
Qty: 30 | Refills: 3 | Status: DISCONTINUED | COMMUNITY
Start: 2020-08-26 | End: 2021-08-23

## 2021-08-23 RX ADMIN — GEMCITABINE HYDROCHLORIDE 0 GM/52.6ML: 2 INJECTION INTRAVENOUS at 00:00

## 2021-08-23 RX ADMIN — GEMCITABINE HYDROCHLORIDE 0 GM/52.6ML: 1 INJECTION, POWDER, LYOPHILIZED, FOR SOLUTION INTRAVENOUS at 00:00

## 2021-08-24 ENCOUNTER — RESULT REVIEW (OUTPATIENT)
Age: 76
End: 2021-08-24

## 2021-08-25 ENCOUNTER — APPOINTMENT (OUTPATIENT)
Dept: DERMATOLOGY | Facility: CLINIC | Age: 76
End: 2021-08-25
Payer: MEDICARE

## 2021-08-25 PROCEDURE — 17280 DSTR MAL LS F/E/E/N/L/M .5/<: CPT

## 2021-08-25 PROCEDURE — 99212 OFFICE O/P EST SF 10 MIN: CPT | Mod: 25

## 2021-08-25 PROCEDURE — 17000 DESTRUCT PREMALG LESION: CPT | Mod: 59

## 2021-10-23 ENCOUNTER — APPOINTMENT (OUTPATIENT)
Dept: ORTHOPEDIC SURGERY | Facility: CLINIC | Age: 76
End: 2021-10-23
Payer: MEDICARE

## 2021-10-23 VITALS
TEMPERATURE: 98.1 F | SYSTOLIC BLOOD PRESSURE: 176 MMHG | HEIGHT: 62 IN | HEART RATE: 62 BPM | BODY MASS INDEX: 25.03 KG/M2 | WEIGHT: 136 LBS | DIASTOLIC BLOOD PRESSURE: 77 MMHG

## 2021-10-23 DIAGNOSIS — S76.011A STRAIN OF MUSCLE, FASCIA AND TENDON OF RIGHT HIP, INITIAL ENCOUNTER: ICD-10-CM

## 2021-10-23 DIAGNOSIS — M54.50 LOW BACK PAIN, UNSPECIFIED: ICD-10-CM

## 2021-10-23 DIAGNOSIS — G89.29 LOW BACK PAIN, UNSPECIFIED: ICD-10-CM

## 2021-10-23 PROCEDURE — ZZZZZ: CPT

## 2021-10-23 PROCEDURE — 99215 OFFICE O/P EST HI 40 MIN: CPT

## 2021-10-23 NOTE — PHYSICAL EXAM
[de-identified] : Laterality: Right hip\par \par General: Alert and oriented x3.  In no acute distress.  Pleasant in nature with a normal affect.  No apparent respiratory distress. \par Swelling: Negative\par \par ROM: \par Flexion: 0-130\par Extension: 0-30 degrees\par Internal Rotation: 40 degrees\par External Rotation: 45 degrees\par Pain with Hyperflexion: Negative\par \par Special Tests:\par ASHLYN Test (Oneal's Test): Negative\par FADIR Test (Labral Tear/STEPHANIE): Negative\par Straight Leg Raise: Negative\par Trendelenburg Test/Gait: Negative\par \par Palpation:\par Greater Trochanteric Bursa: Negative\par Hip Flexors: Negative\par \par Neurovascularly Intact with no Sensory or Motor Deficits. \par \par Lumbosacral Physical Examination: \par \par General: Alert and oriented x3.  In no acute distress.  Pleasant in nature with a normal affect.  No apparent respiratory distress. \par Inspection: No swelling, No scoliosis present.\par \par ROM: With some discomfort\par Forward Flexion: 80 degrees\par Extension: 20 degrees\par Lateral Flexion to Left: 30 degrees\par Lateral Flexion to Right: 30 degrees\par Rotation to Left: 40 degrees\par Rotation to Right: 40 degrees\par \par Normal Hip ROM.\par \par Palpation: \par Thoracolumbar Paraspinal Muscles: Positive\par Costovertebral Angle Pain/Spine Percussion: Negative for pain over the Kidney's with Spine Percussion.\par \par Special Tests:\par Straight Leg Raise: Negative\par \par Neurovascularly Intact Sensory and Motor with No Foot Drop present on examination today.  [de-identified] : X-rays of the right hip and pelvis reviewed, 10/23/2021: Mild arthritic changes right hip.\par \par X-rays of the lumbosacral spine reviewed, 10/23/2021: Degenerative disc disease with mild arthritis noted.  Spondylolisthesis L5-S1.

## 2021-10-23 NOTE — DISCUSSION/SUMMARY
[de-identified] : At this point in time I want the patient to continue with conservative management.  I gave her an outpatient physical therapy prescription for her lower back as well as her right hip.  I prescribed her an anti-inflammatory gel and Lidoderm patches to use as needed for her hip and her lower back pains.  Hold off on advanced imaging at this time such as an MRI to see if conservative management helps with her pain.  At this point in time the patient has no restrictions.  She can follow-up in the office on an as-needed basis.  The patient understood the treatment course.

## 2021-10-23 NOTE — HISTORY OF PRESENT ILLNESS
[de-identified] : The patient is a 76-year-old female who presents with acute onset of right-sided hip pain and right-sided lower back pain.  The patient has no numbness or tingling or pain shooting down her legs and she has no problems using the bathroom at this time.  She had no trauma to her hip or her lower back.  She is a very active female who continues to play golf and stay active.  She would like to continue with her activities.  Her pain scale in the right hip and lower back region is a 4 out of 10.  She has no other complaints.

## 2021-11-15 ENCOUNTER — APPOINTMENT (OUTPATIENT)
Dept: DERMATOLOGY | Facility: CLINIC | Age: 76
End: 2021-11-15
Payer: MEDICARE

## 2021-11-15 PROCEDURE — 17003 DESTRUCT PREMALG LES 2-14: CPT

## 2021-11-15 PROCEDURE — 17000 DESTRUCT PREMALG LESION: CPT

## 2021-11-15 PROCEDURE — 99213 OFFICE O/P EST LOW 20 MIN: CPT | Mod: 25

## 2021-11-24 ENCOUNTER — APPOINTMENT (OUTPATIENT)
Dept: UROLOGY | Facility: CLINIC | Age: 76
End: 2021-11-24
Payer: MEDICARE

## 2021-11-24 VITALS — DIASTOLIC BLOOD PRESSURE: 69 MMHG | SYSTOLIC BLOOD PRESSURE: 159 MMHG | HEART RATE: 75 BPM

## 2021-11-24 LAB
BILIRUB UR QL STRIP: ABNORMAL
CLARITY UR: CLEAR
COLLECTION METHOD: NORMAL
GLUCOSE UR-MCNC: NORMAL
HCG UR QL: 0.2 EU/DL
HGB UR QL STRIP.AUTO: NORMAL
KETONES UR-MCNC: ABNORMAL
LEUKOCYTE ESTERASE UR QL STRIP: ABNORMAL
NITRITE UR QL STRIP: NORMAL
PH UR STRIP: 5.5
PROT UR STRIP-MCNC: ABNORMAL
SP GR UR STRIP: >1.03

## 2021-11-24 PROCEDURE — 81003 URINALYSIS AUTO W/O SCOPE: CPT | Mod: QW

## 2021-11-24 PROCEDURE — 52000 CYSTOURETHROSCOPY: CPT

## 2021-12-08 ENCOUNTER — APPOINTMENT (OUTPATIENT)
Dept: INTERNAL MEDICINE | Facility: CLINIC | Age: 76
End: 2021-12-08
Payer: MEDICARE

## 2021-12-08 VITALS
HEIGHT: 62 IN | SYSTOLIC BLOOD PRESSURE: 130 MMHG | BODY MASS INDEX: 25.03 KG/M2 | DIASTOLIC BLOOD PRESSURE: 70 MMHG | WEIGHT: 136 LBS

## 2021-12-08 DIAGNOSIS — Z01.818 ENCOUNTER FOR OTHER PREPROCEDURAL EXAMINATION: ICD-10-CM

## 2021-12-08 DIAGNOSIS — Z85.51 PERSONAL HISTORY OF MALIGNANT NEOPLASM OF BLADDER: ICD-10-CM

## 2021-12-08 PROCEDURE — 99215 OFFICE O/P EST HI 40 MIN: CPT

## 2021-12-08 NOTE — PHYSICAL EXAM
[Well Nourished] : well nourished [Well Developed] : well developed [Well-Appearing] : well-appearing [Normal Sclera/Conjunctiva] : normal sclera/conjunctiva [PERRL] : pupils equal round and reactive to light [EOMI] : extraocular movements intact [Normal Outer Ear/Nose] : the outer ears and nose were normal in appearance [Normal Oropharynx] : the oropharynx was normal [No JVD] : no jugular venous distention [No Lymphadenopathy] : no lymphadenopathy [Supple] : supple [Thyroid Normal, No Nodules] : the thyroid was normal and there were no nodules present [No Respiratory Distress] : no respiratory distress  [No Accessory Muscle Use] : no accessory muscle use [Clear to Auscultation] : lungs were clear to auscultation bilaterally [Normal Rate] : normal rate  [Regular Rhythm] : with a regular rhythm [Normal S1, S2] : normal S1 and S2 [No Murmur] : no murmur heard [No Carotid Bruits] : no carotid bruits [No Abdominal Bruit] : a ~M bruit was not heard ~T in the abdomen [No Varicosities] : no varicosities [Pedal Pulses Present] : the pedal pulses are present [No Edema] : there was no peripheral edema [No Palpable Aorta] : no palpable aorta [No Extremity Clubbing/Cyanosis] : no extremity clubbing/cyanosis [Soft] : abdomen soft [Non Tender] : non-tender [Non-distended] : non-distended [No Masses] : no abdominal mass palpated [No HSM] : no HSM [Normal Bowel Sounds] : normal bowel sounds [Normal Posterior Cervical Nodes] : no posterior cervical lymphadenopathy [Normal Anterior Cervical Nodes] : no anterior cervical lymphadenopathy [No CVA Tenderness] : no CVA  tenderness [No Spinal Tenderness] : no spinal tenderness [No Joint Swelling] : no joint swelling [Grossly Normal Strength/Tone] : grossly normal strength/tone [No Rash] : no rash [Coordination Grossly Intact] : coordination grossly intact [No Focal Deficits] : no focal deficits [Normal Gait] : normal gait [Normal Affect] : the affect was normal [Normal Insight/Judgement] : insight and judgment were intact [de-identified] : LEFT LEG  IN A BOOT

## 2021-12-08 NOTE — HISTORY OF PRESENT ILLNESS
[No Pertinent Cardiac History] : no history of aortic stenosis, atrial fibrillation, coronary artery disease, recent myocardial infarction, or implantable device/pacemaker [No Pertinent Pulmonary History] : no history of asthma, COPD, sleep apnea, or smoking [No Adverse Anesthesia Reaction] : no adverse anesthesia reaction in self or family member [NSAIDs: _____] : NSAIDs: [unfilled] [FreeTextEntry1] : CATARACT [FreeTextEntry4] : 75yo  FEMALE WITH HX HTN CHOL AND BLADDER CANCER FOR MED CLEARANCE FOR   CATARACT SURGERIES

## 2021-12-08 NOTE — PLAN
[FreeTextEntry1] : 77yo  FEMALE WITH HX HTN CHOL AND BLADDER CANCER FOR MED CLEARANCE FOR   CATARACT SURGERIES\par PT IS MEDICALLY CLEAR FOR PROCEDURE FASED OUTSIDE PAPER FORM TO OPTHO\par

## 2022-02-24 ENCOUNTER — APPOINTMENT (OUTPATIENT)
Dept: UROLOGY | Facility: CLINIC | Age: 77
End: 2022-02-24
Payer: MEDICARE

## 2022-02-24 VITALS — SYSTOLIC BLOOD PRESSURE: 147 MMHG | DIASTOLIC BLOOD PRESSURE: 78 MMHG | HEART RATE: 66 BPM

## 2022-02-24 LAB
BILIRUB UR QL STRIP: NORMAL
CLARITY UR: CLEAR
COLLECTION METHOD: NORMAL
GLUCOSE UR-MCNC: NORMAL
HCG UR QL: 0.2 EU/DL
HGB UR QL STRIP.AUTO: NORMAL
KETONES UR-MCNC: NORMAL
LEUKOCYTE ESTERASE UR QL STRIP: NORMAL
NITRITE UR QL STRIP: NORMAL
PH UR STRIP: 5.5
PROT UR STRIP-MCNC: NORMAL
SP GR UR STRIP: 1.01

## 2022-02-24 PROCEDURE — 52000 CYSTOURETHROSCOPY: CPT

## 2022-02-24 PROCEDURE — 81003 URINALYSIS AUTO W/O SCOPE: CPT | Mod: QW

## 2022-03-09 ENCOUNTER — APPOINTMENT (OUTPATIENT)
Dept: UROLOGY | Facility: CLINIC | Age: 77
End: 2022-03-09
Payer: MEDICARE

## 2022-03-09 ENCOUNTER — RESULT CHARGE (OUTPATIENT)
Age: 77
End: 2022-03-09

## 2022-03-09 VITALS — HEART RATE: 68 BPM | SYSTOLIC BLOOD PRESSURE: 156 MMHG | DIASTOLIC BLOOD PRESSURE: 71 MMHG

## 2022-03-09 PROCEDURE — 51720 TREATMENT OF BLADDER LESION: CPT

## 2022-03-09 RX ORDER — GEMCITABINE HYDROCHLORIDE 2 G/52.6ML
2 INJECTION INTRAVENOUS
Refills: 0 | Status: COMPLETED | OUTPATIENT
Start: 2022-03-09

## 2022-03-09 RX ADMIN — GEMCITABINE HYDROCHLORIDE 0 GM/52.6ML: 1 INJECTION, POWDER, LYOPHILIZED, FOR SOLUTION INTRAVENOUS at 00:00

## 2022-03-16 ENCOUNTER — APPOINTMENT (OUTPATIENT)
Dept: UROLOGY | Facility: CLINIC | Age: 77
End: 2022-03-16
Payer: MEDICARE

## 2022-03-16 VITALS — SYSTOLIC BLOOD PRESSURE: 148 MMHG | DIASTOLIC BLOOD PRESSURE: 72 MMHG | HEART RATE: 70 BPM

## 2022-03-16 LAB
BILIRUB UR QL STRIP: NORMAL
CLARITY UR: CLEAR
COLLECTION METHOD: NORMAL
GLUCOSE UR-MCNC: NORMAL
HCG UR QL: 0.2 EU/DL
HGB UR QL STRIP.AUTO: NORMAL
KETONES UR-MCNC: NORMAL
LEUKOCYTE ESTERASE UR QL STRIP: ABNORMAL
NITRITE UR QL STRIP: NORMAL
PH UR STRIP: 5.5
PROT UR STRIP-MCNC: NORMAL
SP GR UR STRIP: 1.02

## 2022-03-16 PROCEDURE — 81003 URINALYSIS AUTO W/O SCOPE: CPT | Mod: QW

## 2022-03-16 PROCEDURE — 51720 TREATMENT OF BLADDER LESION: CPT

## 2022-03-16 RX ORDER — GEMCITABINE HYDROCHLORIDE 2 G/52.6ML
2 INJECTION INTRAVENOUS
Refills: 0 | Status: COMPLETED | OUTPATIENT
Start: 2022-03-16

## 2022-03-16 RX ADMIN — GEMCITABINE HYDROCHLORIDE 0 GM/52.6ML: 1 INJECTION, POWDER, LYOPHILIZED, FOR SOLUTION INTRAVENOUS at 00:00

## 2022-03-21 NOTE — ASU PATIENT PROFILE, ADULT - ARRIVAL TIME
[facility]  Discharge Summary    Blaise Freed Patient Status:      3/19/2022 MRN [id number]   [facility] 2SW-N Attending Gregg Ricardo MD   Hosp Day # 2 PCP No primary care provider on file. Date of Delivery: 3/19/2022  Time of Delivery: 11:18 PM  Delivery Type: Normal spontaneous vaginal delivery    Apgars:   1 minute: 8                5 minutes: 9              10 minutes:     Pertinent Maternal Labs: GBS negative    Mother's Blood Type: O +    Infant's Blood Type: pending    Coomb's Test: pending    Nursery Course: elevated total bilirubin/high intermediate DOL 2  NBS Done: yes  HEP B Vaccine:yes  HEP B IgG: no  RSV Immunoglobulin (Synagis): no  Void: yes  BM: yes    Hearing Screen Results: passed    Physical Exam:   Birth Weight: Weight: 7 lb 13.9 oz (3.57 kg) (Filed from Delivery Summary)  Weight Change Since Birth: -4%  Gen:   Awake, alert, appropriate, nontoxic, in no appearant distress  Skin:   No rashes, no petechiae, no jaundice  HEENT:  AFOSF, no eye discharge bilaterally, neck supple, no nasal discharge, no nasal    flaring, no LAD, oral mucous membranes moist  Lungs:   CTA bilaterally, equal air entry, no wheezing, no coarseness  Chest:  S1, S2 no murmur  Abd:   Soft, nontender, nondistended, + bowel sounds, no HSM, no masses  Ext:  No cyanosis/edema/clubbing, peripheral pulses equal bilaterally, no clicks  bilaterally  :  NL male, testes bilateral  Neuro:  +grasp, +suck, +audrey, good tone, no focal deficits      Assessment: Normal, healthy  with high intermediate total bilirubin    Plan:   1) Cord blood for type/moi. Repeat total bili at noon  2) Awaiting circumcision later this morning  3) plan for discharge latera today pending bilirubin results. Date of Discharge: 2022    Medications: none    Special Instructions: None.     Fadi Salcido MD  3/21/2022  8:45 AM
15:00

## 2022-03-23 ENCOUNTER — APPOINTMENT (OUTPATIENT)
Dept: UROLOGY | Facility: CLINIC | Age: 77
End: 2022-03-23
Payer: MEDICARE

## 2022-03-23 VITALS — DIASTOLIC BLOOD PRESSURE: 67 MMHG | SYSTOLIC BLOOD PRESSURE: 100 MMHG | HEART RATE: 71 BPM

## 2022-03-23 PROCEDURE — 81003 URINALYSIS AUTO W/O SCOPE: CPT | Mod: QW

## 2022-03-23 PROCEDURE — 51720 TREATMENT OF BLADDER LESION: CPT

## 2022-03-23 RX ORDER — GEMCITABINE HYDROCHLORIDE 2 G/52.6ML
2 INJECTION INTRAVENOUS
Qty: 1 | Refills: 0 | Status: COMPLETED | COMMUNITY
Start: 2022-03-23 | End: 2022-03-23

## 2022-03-23 RX ORDER — GEMCITABINE HYDROCHLORIDE 2 G/52.6ML
2 INJECTION INTRAVENOUS
Refills: 0 | Status: COMPLETED | OUTPATIENT
Start: 2022-03-23

## 2022-03-23 RX ORDER — LIDOCAINE 5% 700 MG/1
5 PATCH TOPICAL
Qty: 30 | Refills: 3 | Status: DISCONTINUED | COMMUNITY
Start: 2021-10-23 | End: 2022-03-23

## 2022-03-23 RX ORDER — DESMOPRESSIN ACETATE 0.1 MG/ML
0.01 SPRAY NASAL
Qty: 5 | Refills: 0 | Status: DISCONTINUED | COMMUNITY
Start: 2021-06-16 | End: 2022-03-23

## 2022-03-23 RX ADMIN — Medication 0 GM/52.6ML: at 00:00

## 2022-05-16 ENCOUNTER — APPOINTMENT (OUTPATIENT)
Dept: DERMATOLOGY | Facility: CLINIC | Age: 77
End: 2022-05-16
Payer: MEDICARE

## 2022-05-16 PROCEDURE — 99213 OFFICE O/P EST LOW 20 MIN: CPT

## 2022-09-19 NOTE — ASU PREOP CHECKLIST - SPO2 (%)
Patient Notified. Patient did end up going to the lab on Saturday and was upset their was no lab he will talk to you at his appointment    100

## 2022-09-28 ENCOUNTER — APPOINTMENT (OUTPATIENT)
Dept: UROLOGY | Facility: CLINIC | Age: 77
End: 2022-09-28

## 2022-10-03 RX ORDER — LEVOCETIRIZINE DIHYDROCHLORIDE 5 MG/1
5 TABLET ORAL DAILY
Qty: 30 | Refills: 0 | Status: ACTIVE | COMMUNITY
Start: 1900-01-01 | End: 1900-01-01

## 2022-10-04 ENCOUNTER — APPOINTMENT (OUTPATIENT)
Dept: UROLOGY | Facility: CLINIC | Age: 77
End: 2022-10-04

## 2022-10-04 VITALS — DIASTOLIC BLOOD PRESSURE: 60 MMHG | SYSTOLIC BLOOD PRESSURE: 142 MMHG | HEART RATE: 67 BPM

## 2022-10-04 DIAGNOSIS — C67.9 MALIGNANT NEOPLASM OF BLADDER, UNSPECIFIED: ICD-10-CM

## 2022-10-04 PROCEDURE — 52000 CYSTOURETHROSCOPY: CPT

## 2022-10-04 PROCEDURE — 81003 URINALYSIS AUTO W/O SCOPE: CPT | Mod: QW

## 2022-11-01 ENCOUNTER — APPOINTMENT (OUTPATIENT)
Dept: UROLOGY | Facility: CLINIC | Age: 77
End: 2022-11-01

## 2022-11-01 VITALS — HEART RATE: 74 BPM | DIASTOLIC BLOOD PRESSURE: 69 MMHG | SYSTOLIC BLOOD PRESSURE: 117 MMHG

## 2022-11-01 LAB
BILIRUB UR QL STRIP: ABNORMAL
CLARITY UR: CLEAR
COLLECTION METHOD: NORMAL
GLUCOSE UR-MCNC: NORMAL
HCG UR QL: 0.2 EU/DL
HGB UR QL STRIP.AUTO: ABNORMAL
KETONES UR-MCNC: NORMAL
LEUKOCYTE ESTERASE UR QL STRIP: ABNORMAL
NITRITE UR QL STRIP: NORMAL
PH UR STRIP: 6
PROT UR STRIP-MCNC: ABNORMAL
SP GR UR STRIP: 1.02

## 2022-11-01 PROCEDURE — 51720 TREATMENT OF BLADDER LESION: CPT

## 2022-11-01 PROCEDURE — 81003 URINALYSIS AUTO W/O SCOPE: CPT | Mod: QW

## 2022-11-01 RX ORDER — MITOMYCIN 40 MG/80ML
40 INJECTION, POWDER, LYOPHILIZED, FOR SOLUTION INTRAVENOUS
Qty: 1 | Refills: 0 | Status: COMPLETED | OUTPATIENT
Start: 2022-11-01

## 2022-11-01 RX ADMIN — MITOMYCIN 0 MG: 40 INJECTION, POWDER, LYOPHILIZED, FOR SOLUTION INTRAVENOUS at 00:00

## 2022-11-08 ENCOUNTER — APPOINTMENT (OUTPATIENT)
Dept: UROLOGY | Facility: CLINIC | Age: 77
End: 2022-11-08

## 2022-11-08 VITALS — DIASTOLIC BLOOD PRESSURE: 64 MMHG | SYSTOLIC BLOOD PRESSURE: 147 MMHG | HEART RATE: 58 BPM

## 2022-11-08 PROCEDURE — 51720 TREATMENT OF BLADDER LESION: CPT

## 2022-11-08 PROCEDURE — 81003 URINALYSIS AUTO W/O SCOPE: CPT | Mod: QW

## 2022-11-08 RX ORDER — MITOMYCIN 40 MG/80ML
40 INJECTION, POWDER, LYOPHILIZED, FOR SOLUTION INTRAVENOUS
Qty: 1 | Refills: 0 | Status: COMPLETED | OUTPATIENT
Start: 2022-11-08

## 2022-11-08 RX ADMIN — MITOMYCIN 0 MG: 40 INJECTION, POWDER, LYOPHILIZED, FOR SOLUTION INTRAVENOUS at 00:00

## 2022-11-15 ENCOUNTER — APPOINTMENT (OUTPATIENT)
Dept: UROLOGY | Facility: CLINIC | Age: 77
End: 2022-11-15

## 2022-11-15 PROCEDURE — 51720 TREATMENT OF BLADDER LESION: CPT

## 2022-11-15 PROCEDURE — 81003 URINALYSIS AUTO W/O SCOPE: CPT | Mod: QW

## 2022-11-15 RX ORDER — MITOMYCIN 40 MG/80ML
40 INJECTION, POWDER, LYOPHILIZED, FOR SOLUTION INTRAVENOUS
Qty: 1 | Refills: 0 | Status: COMPLETED | OUTPATIENT
Start: 2022-11-15

## 2022-11-15 RX ADMIN — MITOMYCIN 0 MG: 40 INJECTION, POWDER, LYOPHILIZED, FOR SOLUTION INTRAVENOUS at 00:00

## 2022-11-22 ENCOUNTER — OFFICE (OUTPATIENT)
Dept: URBAN - METROPOLITAN AREA CLINIC 104 | Facility: CLINIC | Age: 77
Setting detail: OPHTHALMOLOGY
End: 2022-11-22
Payer: MEDICARE

## 2022-11-22 DIAGNOSIS — H43.391: ICD-10-CM

## 2022-11-22 DIAGNOSIS — H35.033: ICD-10-CM

## 2022-11-22 DIAGNOSIS — Z96.1: ICD-10-CM

## 2022-11-22 DIAGNOSIS — H43.811: ICD-10-CM

## 2022-11-22 PROCEDURE — 99213 OFFICE O/P EST LOW 20 MIN: CPT | Performed by: SPECIALIST

## 2022-11-22 ASSESSMENT — VISUAL ACUITY
OS_BCVA: 20/25
OD_BCVA: 20/20

## 2022-11-22 ASSESSMENT — CONFRONTATIONAL VISUAL FIELD TEST (CVF)
OS_FINDINGS: FULL
OD_FINDINGS: FULL

## 2022-11-22 ASSESSMENT — TONOMETRY
OS_IOP_MMHG: 13
OD_IOP_MMHG: 13

## 2022-12-07 ENCOUNTER — RX RENEWAL (OUTPATIENT)
Age: 77
End: 2022-12-07

## 2022-12-07 RX ORDER — LOSARTAN POTASSIUM 50 MG/1
50 TABLET, FILM COATED ORAL
Qty: 90 | Refills: 2 | Status: ACTIVE | COMMUNITY
Start: 2021-08-08 | End: 1900-01-01

## 2022-12-29 ENCOUNTER — APPOINTMENT (OUTPATIENT)
Dept: INTERNAL MEDICINE | Facility: CLINIC | Age: 77
End: 2022-12-29

## 2023-02-27 ENCOUNTER — APPOINTMENT (OUTPATIENT)
Dept: DERMATOLOGY | Facility: CLINIC | Age: 78
End: 2023-02-27
Payer: MEDICARE

## 2023-02-27 PROCEDURE — 17000 DESTRUCT PREMALG LESION: CPT

## 2023-02-27 PROCEDURE — 99213 OFFICE O/P EST LOW 20 MIN: CPT | Mod: 25

## 2023-05-16 ENCOUNTER — APPOINTMENT (OUTPATIENT)
Dept: UROLOGY | Facility: CLINIC | Age: 78
End: 2023-05-16
Payer: MEDICARE

## 2023-05-16 VITALS — HEART RATE: 66 BPM | SYSTOLIC BLOOD PRESSURE: 128 MMHG | DIASTOLIC BLOOD PRESSURE: 74 MMHG

## 2023-05-16 LAB
BILIRUB UR QL STRIP: NORMAL
CLARITY UR: CLEAR
COLLECTION METHOD: NORMAL
GLUCOSE UR-MCNC: NORMAL
HCG UR QL: 0.2 EU/DL
HGB UR QL STRIP.AUTO: ABNORMAL
KETONES UR-MCNC: NORMAL
LEUKOCYTE ESTERASE UR QL STRIP: NORMAL
NITRITE UR QL STRIP: NORMAL
PH UR STRIP: 6
PROT UR STRIP-MCNC: NORMAL
SP GR UR STRIP: 1.02

## 2023-05-16 PROCEDURE — 81003 URINALYSIS AUTO W/O SCOPE: CPT | Mod: QW

## 2023-05-16 PROCEDURE — 52000 CYSTOURETHROSCOPY: CPT

## 2023-05-23 ENCOUNTER — APPOINTMENT (OUTPATIENT)
Dept: UROLOGY | Facility: CLINIC | Age: 78
End: 2023-05-23

## 2023-05-24 ENCOUNTER — APPOINTMENT (OUTPATIENT)
Dept: UROLOGY | Facility: CLINIC | Age: 78
End: 2023-05-24
Payer: MEDICARE

## 2023-05-24 VITALS — DIASTOLIC BLOOD PRESSURE: 77 MMHG | HEART RATE: 60 BPM | SYSTOLIC BLOOD PRESSURE: 174 MMHG

## 2023-05-24 PROCEDURE — 51720 TREATMENT OF BLADDER LESION: CPT

## 2023-05-24 PROCEDURE — 81003 URINALYSIS AUTO W/O SCOPE: CPT | Mod: QW

## 2023-05-24 RX ORDER — MITOMYCIN 40 MG/80ML
40 INJECTION, POWDER, LYOPHILIZED, FOR SOLUTION INTRAVENOUS
Qty: 1 | Refills: 0 | Status: COMPLETED | OUTPATIENT
Start: 2023-05-24

## 2023-05-24 RX ADMIN — MITOMYCIN 0 MG: 40 INJECTION, POWDER, LYOPHILIZED, FOR SOLUTION INTRAVENOUS at 00:00

## 2023-05-30 ENCOUNTER — APPOINTMENT (OUTPATIENT)
Dept: UROLOGY | Facility: CLINIC | Age: 78
End: 2023-05-30
Payer: MEDICARE

## 2023-05-30 PROCEDURE — 51720 TREATMENT OF BLADDER LESION: CPT

## 2023-05-30 PROCEDURE — 81003 URINALYSIS AUTO W/O SCOPE: CPT | Mod: QW

## 2023-05-30 RX ORDER — MITOMYCIN 40 MG/80ML
40 INJECTION, POWDER, LYOPHILIZED, FOR SOLUTION INTRAVENOUS
Qty: 1 | Refills: 0 | Status: COMPLETED | OUTPATIENT
Start: 2023-05-30

## 2023-05-30 RX ADMIN — MITOMYCIN 1 MG: 40 INJECTION, POWDER, LYOPHILIZED, FOR SOLUTION INTRAVENOUS at 00:00

## 2023-06-06 ENCOUNTER — APPOINTMENT (OUTPATIENT)
Dept: UROLOGY | Facility: CLINIC | Age: 78
End: 2023-06-06
Payer: MEDICARE

## 2023-06-06 VITALS — DIASTOLIC BLOOD PRESSURE: 74 MMHG | HEART RATE: 66 BPM | SYSTOLIC BLOOD PRESSURE: 166 MMHG

## 2023-06-06 LAB
BILIRUB UR QL STRIP: NORMAL
CLARITY UR: CLEAR
COLLECTION METHOD: NORMAL
GLUCOSE UR-MCNC: NORMAL
HCG UR QL: 0.2 EU/DL
HGB UR QL STRIP.AUTO: ABNORMAL
KETONES UR-MCNC: NORMAL
LEUKOCYTE ESTERASE UR QL STRIP: ABNORMAL
NITRITE UR QL STRIP: NORMAL
PH UR STRIP: 6
PROT UR STRIP-MCNC: NORMAL
SP GR UR STRIP: 1.01

## 2023-06-06 PROCEDURE — 81003 URINALYSIS AUTO W/O SCOPE: CPT | Mod: QW

## 2023-06-06 PROCEDURE — 51720 TREATMENT OF BLADDER LESION: CPT

## 2023-06-06 RX ORDER — MITOMYCIN 40 MG/80ML
40 INJECTION, POWDER, LYOPHILIZED, FOR SOLUTION INTRAVENOUS
Qty: 1 | Refills: 0 | Status: COMPLETED | OUTPATIENT
Start: 2023-06-06

## 2023-06-06 RX ADMIN — MITOMYCIN 0 MG: 40 INJECTION, POWDER, LYOPHILIZED, FOR SOLUTION INTRAVENOUS at 00:00

## 2023-07-30 ENCOUNTER — EMERGENCY (EMERGENCY)
Facility: HOSPITAL | Age: 78
LOS: 1 days | Discharge: HOME | End: 2023-07-30
Attending: EMERGENCY MEDICINE
Payer: MEDICARE

## 2023-07-30 VITALS
DIASTOLIC BLOOD PRESSURE: 61 MMHG | SYSTOLIC BLOOD PRESSURE: 101 MMHG | RESPIRATION RATE: 18 BRPM | TEMPERATURE: 98 F | HEART RATE: 81 BPM | OXYGEN SATURATION: 100 %

## 2023-07-30 DIAGNOSIS — G56.00 CARPAL TUNNEL SYNDROME, UNSPECIFIED UPPER LIMB: Chronic | ICD-10-CM

## 2023-07-30 DIAGNOSIS — Z98.890 OTHER SPECIFIED POSTPROCEDURAL STATES: Chronic | ICD-10-CM

## 2023-07-30 DIAGNOSIS — C67.9 MALIGNANT NEOPLASM OF BLADDER, UNSPECIFIED: Chronic | ICD-10-CM

## 2023-07-30 DIAGNOSIS — Z90.49 ACQUIRED ABSENCE OF OTHER SPECIFIED PARTS OF DIGESTIVE TRACT: Chronic | ICD-10-CM

## 2023-07-30 LAB
ALBUMIN SERPL ELPH-MCNC: 4 G/DL — SIGNIFICANT CHANGE UP (ref 3.3–5.2)
ALP SERPL-CCNC: 62 U/L — SIGNIFICANT CHANGE UP (ref 40–120)
ALT FLD-CCNC: 15 U/L — SIGNIFICANT CHANGE UP
ANION GAP SERPL CALC-SCNC: 16 MMOL/L — SIGNIFICANT CHANGE UP (ref 5–17)
AST SERPL-CCNC: 19 U/L — SIGNIFICANT CHANGE UP
BASOPHILS # BLD AUTO: 0.05 K/UL — SIGNIFICANT CHANGE UP (ref 0–0.2)
BASOPHILS NFR BLD AUTO: 0.3 % — SIGNIFICANT CHANGE UP (ref 0–2)
BILIRUB SERPL-MCNC: 0.4 MG/DL — SIGNIFICANT CHANGE UP (ref 0.4–2)
BUN SERPL-MCNC: 36.5 MG/DL — HIGH (ref 8–20)
CALCIUM SERPL-MCNC: 8.9 MG/DL — SIGNIFICANT CHANGE UP (ref 8.4–10.5)
CHLORIDE SERPL-SCNC: 103 MMOL/L — SIGNIFICANT CHANGE UP (ref 96–108)
CO2 SERPL-SCNC: 21 MMOL/L — LOW (ref 22–29)
CREAT SERPL-MCNC: 1.58 MG/DL — HIGH (ref 0.5–1.3)
EGFR: 34 ML/MIN/1.73M2 — LOW
EOSINOPHIL # BLD AUTO: 0.1 K/UL — SIGNIFICANT CHANGE UP (ref 0–0.5)
EOSINOPHIL NFR BLD AUTO: 0.7 % — SIGNIFICANT CHANGE UP (ref 0–6)
GLUCOSE SERPL-MCNC: 168 MG/DL — HIGH (ref 70–99)
HCT VFR BLD CALC: 31.2 % — LOW (ref 34.5–45)
HGB BLD-MCNC: 10.7 G/DL — LOW (ref 11.5–15.5)
IMM GRANULOCYTES NFR BLD AUTO: 0.4 % — SIGNIFICANT CHANGE UP (ref 0–0.9)
LIDOCAIN IGE QN: 54 U/L — HIGH (ref 22–51)
LYMPHOCYTES # BLD AUTO: 1.67 K/UL — SIGNIFICANT CHANGE UP (ref 1–3.3)
LYMPHOCYTES # BLD AUTO: 11.5 % — LOW (ref 13–44)
MCHC RBC-ENTMCNC: 31 PG — SIGNIFICANT CHANGE UP (ref 27–34)
MCHC RBC-ENTMCNC: 34.3 GM/DL — SIGNIFICANT CHANGE UP (ref 32–36)
MCV RBC AUTO: 90.4 FL — SIGNIFICANT CHANGE UP (ref 80–100)
MONOCYTES # BLD AUTO: 0.64 K/UL — SIGNIFICANT CHANGE UP (ref 0–0.9)
MONOCYTES NFR BLD AUTO: 4.4 % — SIGNIFICANT CHANGE UP (ref 2–14)
NEUTROPHILS # BLD AUTO: 12.04 K/UL — HIGH (ref 1.8–7.4)
NEUTROPHILS NFR BLD AUTO: 82.7 % — HIGH (ref 43–77)
PLATELET # BLD AUTO: 249 K/UL — SIGNIFICANT CHANGE UP (ref 150–400)
POTASSIUM SERPL-MCNC: 3.9 MMOL/L — SIGNIFICANT CHANGE UP (ref 3.5–5.3)
POTASSIUM SERPL-SCNC: 3.9 MMOL/L — SIGNIFICANT CHANGE UP (ref 3.5–5.3)
PROT SERPL-MCNC: 6.6 G/DL — SIGNIFICANT CHANGE UP (ref 6.6–8.7)
RBC # BLD: 3.45 M/UL — LOW (ref 3.8–5.2)
RBC # FLD: 12.2 % — SIGNIFICANT CHANGE UP (ref 10.3–14.5)
SODIUM SERPL-SCNC: 140 MMOL/L — SIGNIFICANT CHANGE UP (ref 135–145)
TROPONIN T SERPL-MCNC: <0.01 NG/ML — SIGNIFICANT CHANGE UP (ref 0–0.06)
WBC # BLD: 14.56 K/UL — HIGH (ref 3.8–10.5)
WBC # FLD AUTO: 14.56 K/UL — HIGH (ref 3.8–10.5)

## 2023-07-30 PROCEDURE — 72125 CT NECK SPINE W/O DYE: CPT | Mod: 26,MG

## 2023-07-30 PROCEDURE — 70450 CT HEAD/BRAIN W/O DYE: CPT | Mod: 26,MG

## 2023-07-30 PROCEDURE — G1004: CPT

## 2023-07-30 PROCEDURE — 99285 EMERGENCY DEPT VISIT HI MDM: CPT | Mod: GC

## 2023-07-30 PROCEDURE — 73502 X-RAY EXAM HIP UNI 2-3 VIEWS: CPT | Mod: 26,RT

## 2023-07-30 PROCEDURE — 71046 X-RAY EXAM CHEST 2 VIEWS: CPT | Mod: 26

## 2023-07-30 RX ORDER — SODIUM CHLORIDE 9 MG/ML
1000 INJECTION INTRAMUSCULAR; INTRAVENOUS; SUBCUTANEOUS ONCE
Refills: 0 | Status: COMPLETED | OUTPATIENT
Start: 2023-07-30 | End: 2023-07-30

## 2023-07-30 RX ORDER — FAMOTIDINE 10 MG/ML
20 INJECTION INTRAVENOUS ONCE
Refills: 0 | Status: COMPLETED | OUTPATIENT
Start: 2023-07-30 | End: 2023-07-30

## 2023-07-30 RX ORDER — ONDANSETRON 8 MG/1
4 TABLET, FILM COATED ORAL ONCE
Refills: 0 | Status: COMPLETED | OUTPATIENT
Start: 2023-07-30 | End: 2023-07-30

## 2023-07-30 RX ADMIN — ONDANSETRON 4 MILLIGRAM(S): 8 TABLET, FILM COATED ORAL at 22:18

## 2023-07-30 RX ADMIN — Medication 30 MILLILITER(S): at 22:17

## 2023-07-30 RX ADMIN — FAMOTIDINE 20 MILLIGRAM(S): 10 INJECTION INTRAVENOUS at 22:18

## 2023-07-30 RX ADMIN — SODIUM CHLORIDE 1000 MILLILITER(S): 9 INJECTION INTRAMUSCULAR; INTRAVENOUS; SUBCUTANEOUS at 22:17

## 2023-07-30 NOTE — ED ADULT NURSE NOTE - OBJECTIVE STATEMENT
assumed pt care pt stated was walking dog at approx 2030 when dog pulled on leash and pt stepped onto right foot "wrong" pt felt twist in right leg, also scraped forehead on tree branch, denies fall at time of event, denies LOC. pt is on blood thinners for stents. Pt also c/o of diarrhea starting this evening believes the dinner she ate gave her "the runs". RR even unlabored cta pulses strong, alert and ambulatory. [facility]

## 2023-07-30 NOTE — ED PROVIDER NOTE - NS ED ROS FT
[facility] OFFICE VISIT    Jacob is now approximately 2  weeks out from  Right  total hip replacement.    Doing well.  Complaints of,  none.    Taking nothing for pain.    Ambulating with walker.   Pain medication refill requested, No      Denies SOB (shortness of breath), drainage from wound, calf pain or tenderness.        I have reviewed with the patient the past medical history, family history, social history, medications and allergies listed in the medical record as obtained by my nursing staff and support staff and agree with their documentation.       ---------------------------               04/08/19                      0923         ---------------------------   BP:          133/72         Pulse:         56           Temp:   97.7 °F (36.5 °C)  ---------------------------         Wound to be healing well.  There is expected warmth, swelling, ecchymosis. There is not drainage. Gait without antalgia using  no support. No pain with gentle ROM (range of motion)  about the hip, not pushed to its extreme. Leg lengths are  equal.  Distally there is no  swelling in the calf with a negative Dylon's sign.  Distally the patient is NV intact.     AP/Lateral of Right  hip demonstrate good overall alignment and placement.  The hip is well reduced.  No alexandria-prosthetic fractures and 0 R heterotopic ossification.         ASSESSMENT:  Postoperative right Superior \"Superpath\" hip replacement  2 weeks,  good condition.       PLAN:    Restrictions:     Do not submerge incision under water.       Instructions:   Begin Physical Therapy.  Advance to cane.  Increase activity as tolerated.  Continue compression stockings for swelling.  Wear compressions stockings for car trips greater than 2 hours and air travel.  Need antibiotics before any dental work or GI surgery for a minimum of 2 years.  OK to shower.  OK to drive when off walker and you feel safe.  continue Aspirin 325 mg , 1 tab by mouth twice  daily for 4 weeks.   Do not take Ibuprofen type medications while on Aspirin.               Recheck in 1 month, earlier on a PRN basis.  All the patient's questions were answered.   X-ray will not be required at the next visit unless the patient is having problems.      Toney Montenegro PA-C  [facility]  [phone]    Supervising Physician for this date and note.   Jam Ann MD   General: No fever, chills.  Respiratory: No SOB  Cardiac: No chest pain  GI: No abdominal pain. +nausea, vomiting, diarrhea  Neuro: No headache. +dizziness  MSK: No muscle pain, back pain. +R hip pain  Psych: No known mental health issues.  Endocrine: No heat/cold intolerance, no polyuria/polydipsia.  Heme: No easy bruising or bleeding.  Allergic: No pruritis, dermatitis, or environmental allergies.

## 2023-07-30 NOTE — ED PROVIDER NOTE - OBJECTIVE STATEMENT
Patient is a 77-year-old female with past CAD s/p 5 stents in November and December 2022, bladder cancer s/p chemo in April/May 23 HTN, HLD, osteoporosis below presents to the ED complaining of right leg pain, head trauma.  Patient states she was walking her dog when the dog tried to take off after something, drove her right leg into the ground, hit her head on a tree branch.  No LOC, no headache.  Patient does feel dizzy, nauseous, vomiting, had diarrhea, felt like she was going to pass out.  Was feeling fine before this happened.  No blood thinners, but is on DAPT (prasugrel, aspirin).  Denies fever, chills, chest pain, shortness of breath, abdominal pain. Patient is a 77-year-old female with past CAD s/p 5 stents in November and December 2022, bladder cancer s/p chemo in April/May 23 HTN, HLD, osteoporosis below presents to the ED complaining of right leg pain, head trauma.  Patient states she was walking her dog when the dog tried to take off after something, drove her right leg into the ground, hit her head on a tree branch.  No LOC, no headache.  Patient does feel dizzy, nauseous, vomiting, had diarrhea, felt like she was going to pass out.  Was feeling fine before this happened.  No blood thinners, but is on DAPT (prasugrel, aspirin).  Denies fever, chills, chest pain, shortness of breath, abdominal pain. Patient also mentioned that she ate some chinese food that might have been bad, she has not had chinese food in a while.

## 2023-07-30 NOTE — ED PROVIDER NOTE - ATTENDING CONTRIBUTION TO CARE
No 78 yo F presents to ED c/o head injury and R hip/pelvic pain s/p mechanical fall while walking her dog and struck a tree branch with her head.  No LOC, headache or neck pain.  On arrival to ED pt started having diarrhea after eating Chinese food earlier tonight and developed increased nausea and dizziness.  While in x-ray standing for here CXR, pt had brief syncopal.  Pt noted to be diaphoretic and hypotensive.   On exam awake and alert, now slightly diaphoretic, PERRL, Neck supple, cor Reg, Lungs clear b/l, Abd soft, no localized tenderness, Ext FROM, no edema, Neuro non-focal.  will hydrate, check labs, X-rays and CT's, observe and re-eval

## 2023-07-30 NOTE — ED ADULT NURSE NOTE - CAS ELECT INFOMATION PROVIDED
Patient discharged by provider MD Goodrich. No signs of acute distress noted, respirations even and unlabored. Refer to provider notes./DC instructions

## 2023-07-30 NOTE — ED PROVIDER NOTE - CLINICAL SUMMARY MEDICAL DECISION MAKING FREE TEXT BOX
Patient is a 77-year-old female with past CAD s/p 5 stents in November and December 2022, bladder cancer s/p chemo in April/May 23 HTN, HLD, osteoporosis below presents to the ED complaining of right leg pain, head trauma. Will do cardiac work up for syncope/presyncope, ct head, give fluids

## 2023-07-30 NOTE — ED ADULT NURSE NOTE - NSFALLHARMRISKINTERV_ED_ALL_ED

## 2023-07-30 NOTE — ED PROVIDER NOTE - PROGRESS NOTE DETAILS
Rapid response called, syncopized in Xray, caught, did not fall. Fluids started. MD Kp: No longer dizzy, cr improved after fluids. Will dc home.

## 2023-07-30 NOTE — ED ADULT NURSE REASSESSMENT NOTE - NS ED NURSE REASSESS COMMENT FT1
At Xray pt stood for scan and stated to xray tech "I feel dizzy" per xray tech pt passed out began to fall backwards. Pt was lowered to stretcher and rapid response was called.  MDs responded, pt easily rousable shortly after code RR called. Pt given ordered bolus and meds, NNOs received RR cancelled

## 2023-07-30 NOTE — ED PROVIDER NOTE - CARE PLAN
1 Principal Discharge DX:	Head trauma  Secondary Diagnosis:	Right hip pain  Secondary Diagnosis:	Diarrhea

## 2023-07-30 NOTE — ED ADULT TRIAGE NOTE - CHIEF COMPLAINT QUOTE
PT BIBA from home for injury while walking dog. States dog took off she hit her head on tree limb and dug right leg into ground. C/O pain to right upper leg. Denies LOC but c/o dizziness. on blood thinners

## 2023-07-30 NOTE — ED PROVIDER NOTE - PATIENT PORTAL LINK FT
You can access the FollowMyHealth Patient Portal offered by [facility] by registering at the following website: [email or web address]. By joining [employer]’s FollowMyHealth portal, you will also be able to view your health information using other applications (apps) compatible with our system.

## 2023-07-31 VITALS — HEART RATE: 68 BPM | DIASTOLIC BLOOD PRESSURE: 63 MMHG | SYSTOLIC BLOOD PRESSURE: 151 MMHG

## 2023-07-31 LAB
ANION GAP SERPL CALC-SCNC: 9 MMOL/L — SIGNIFICANT CHANGE UP (ref 5–17)
BUN SERPL-MCNC: 33.2 MG/DL — HIGH (ref 8–20)
CALCIUM SERPL-MCNC: 8 MG/DL — LOW (ref 8.4–10.5)
CHLORIDE SERPL-SCNC: 106 MMOL/L — SIGNIFICANT CHANGE UP (ref 96–108)
CO2 SERPL-SCNC: 22 MMOL/L — SIGNIFICANT CHANGE UP (ref 22–29)
CREAT SERPL-MCNC: 1.17 MG/DL — SIGNIFICANT CHANGE UP (ref 0.5–1.3)
EGFR: 48 ML/MIN/1.73M2 — LOW
GLUCOSE SERPL-MCNC: 153 MG/DL — HIGH (ref 70–99)
POTASSIUM SERPL-MCNC: 4.9 MMOL/L — SIGNIFICANT CHANGE UP (ref 3.5–5.3)
POTASSIUM SERPL-SCNC: 4.9 MMOL/L — SIGNIFICANT CHANGE UP (ref 3.5–5.3)
SODIUM SERPL-SCNC: 137 MMOL/L — SIGNIFICANT CHANGE UP (ref 135–145)

## 2023-07-31 PROCEDURE — 96375 TX/PRO/DX INJ NEW DRUG ADDON: CPT

## 2023-07-31 PROCEDURE — 71046 X-RAY EXAM CHEST 2 VIEWS: CPT

## 2023-07-31 PROCEDURE — 73502 X-RAY EXAM HIP UNI 2-3 VIEWS: CPT

## 2023-07-31 PROCEDURE — 70450 CT HEAD/BRAIN W/O DYE: CPT | Mod: MG

## 2023-07-31 PROCEDURE — 36415 COLL VENOUS BLD VENIPUNCTURE: CPT

## 2023-07-31 PROCEDURE — 80048 BASIC METABOLIC PNL TOTAL CA: CPT

## 2023-07-31 PROCEDURE — 72125 CT NECK SPINE W/O DYE: CPT | Mod: MG

## 2023-07-31 PROCEDURE — 85025 COMPLETE CBC W/AUTO DIFF WBC: CPT

## 2023-07-31 PROCEDURE — 83690 ASSAY OF LIPASE: CPT

## 2023-07-31 PROCEDURE — 99284 EMERGENCY DEPT VISIT MOD MDM: CPT | Mod: 25

## 2023-07-31 PROCEDURE — 80053 COMPREHEN METABOLIC PANEL: CPT

## 2023-07-31 PROCEDURE — 96374 THER/PROPH/DIAG INJ IV PUSH: CPT

## 2023-07-31 PROCEDURE — G1004: CPT

## 2023-07-31 PROCEDURE — 84484 ASSAY OF TROPONIN QUANT: CPT

## 2023-07-31 PROCEDURE — 82962 GLUCOSE BLOOD TEST: CPT

## 2023-07-31 RX ORDER — ACETAMINOPHEN 500 MG
650 TABLET ORAL ONCE
Refills: 0 | Status: DISCONTINUED | OUTPATIENT
Start: 2023-07-31 | End: 2023-08-07

## 2023-07-31 RX ORDER — LIDOCAINE 4 G/100G
1 CREAM TOPICAL
Qty: 2 | Refills: 0
Start: 2023-07-31 | End: 2023-08-09

## 2023-07-31 RX ADMIN — SODIUM CHLORIDE 1000 MILLILITER(S): 9 INJECTION INTRAMUSCULAR; INTRAVENOUS; SUBCUTANEOUS at 00:10

## 2023-07-31 NOTE — ED ADULT NURSE REASSESSMENT NOTE - NS ED NURSE REASSESS COMMENT FT1
Patient discharged by provider MD Santos. No signs of acute distress noted, respirations even and unlabored. Refer to provider notes. Patient discharged by provider MD Goodrich. No signs of acute distress noted, respirations even and unlabored. Refer to provider notes.

## 2023-07-31 NOTE — ED ADULT NURSE REASSESSMENT NOTE - NS ED NURSE REASSESS COMMENT FT1
Patient resting comfortably in bed, awaiting CT results. No signs of acute distress or change in mental status noted, safety maintained.

## 2023-07-31 NOTE — ED ADULT NURSE REASSESSMENT NOTE - NS ED NURSE REASSESS COMMENT FT1
Assumed care of patient at 00:30 from critical care RN JANICE Cabrera. Charting as noted. Patient A&Ox4, resp even/unlabored, presents to ED s/p fall. At time of assessment, patient denies pain/discomfort, denies CP/SOB. Patient updated on the plan of care, awaiting CT results. Stretcher locked in lowest position, IV site flushed w/ NS. No redness, swelling or pain noted to site. No signs of acute distress noted, safety maintained. Pt remains on CM in NSR, rate 78, SpO2 97% on room air.

## 2023-08-04 ENCOUNTER — EMERGENCY (EMERGENCY)
Facility: HOSPITAL | Age: 78
LOS: 1 days | Discharge: HOME | End: 2023-08-04
Attending: EMERGENCY MEDICINE
Payer: MEDICARE

## 2023-08-04 VITALS
DIASTOLIC BLOOD PRESSURE: 61 MMHG | SYSTOLIC BLOOD PRESSURE: 164 MMHG | HEART RATE: 75 BPM | TEMPERATURE: 98 F | WEIGHT: 139.11 LBS | OXYGEN SATURATION: 100 % | RESPIRATION RATE: 18 BRPM | HEIGHT: 62 IN

## 2023-08-04 DIAGNOSIS — Z90.49 ACQUIRED ABSENCE OF OTHER SPECIFIED PARTS OF DIGESTIVE TRACT: Chronic | ICD-10-CM

## 2023-08-04 DIAGNOSIS — G56.00 CARPAL TUNNEL SYNDROME, UNSPECIFIED UPPER LIMB: Chronic | ICD-10-CM

## 2023-08-04 DIAGNOSIS — C67.9 MALIGNANT NEOPLASM OF BLADDER, UNSPECIFIED: Chronic | ICD-10-CM

## 2023-08-04 DIAGNOSIS — Z98.890 OTHER SPECIFIED POSTPROCEDURAL STATES: Chronic | ICD-10-CM

## 2023-08-04 PROCEDURE — 99283 EMERGENCY DEPT VISIT LOW MDM: CPT

## 2023-08-04 PROCEDURE — 99282 EMERGENCY DEPT VISIT SF MDM: CPT

## 2023-08-04 NOTE — ED STATDOCS - OBJECTIVE STATEMENT
78 y/o female with PMHx of CAD s/p stents of DAPT, bladder cancer s/p chemo presents to the ED c/o bruising to medial and posterior aspects of right lower extremity. Pt fell 4 days ago, had negative hip x-rays and ct head at that time. Pt denies leg pain, denies difficulty walking, denies hematuria. Pt has ortho appointment on Tuesday.

## 2023-08-04 NOTE — ED STATDOCS - NSFOLLOWUPINSTRUCTIONS_ED_ALL_ED_FT
The natural course of healing will be that the leg is purple and gradually turns green then yellow as it heals. Take Acetaminophen if you have pain. Follow up with Dr Blanca on Tuesday.  If you have  any changes in your condition or any other concerns, please return for repeat evaluation

## 2023-08-04 NOTE — ED STATDOCS - SKIN, MLM
extensive ecchymosis down mid and posterior right lower extremity extensive ecchymosis down mid and posterior right lower extremity. Ext is soft and nt

## 2023-08-04 NOTE — ED STATDOCS - PATIENT PORTAL LINK FT
You can access the FollowMyHealth Patient Portal offered by [facility] by registering at the following website: [email or web address]. By joining [employer]’s FollowMyHealth portal, you will also be able to view your health information using other applications (apps) compatible with our system.

## 2023-08-04 NOTE — ED STATDOCS - CLINICAL SUMMARY MEDICAL DECISION MAKING FREE TEXT BOX
pt with extensive ecchymosis s/p event on Saturday, returns because she was concerned because ecchymosis is traveling down her leg, pt is on blood thinner, no bony ttp, FROM, this practitioner does not have concern for fx. Explained to patient that blood thinner along with gravity has caused extension, pt has ortho f/u in 4 day for reassessment and is encouraged to return for any new or worsening problems

## 2023-08-04 NOTE — ED ADULT TRIAGE NOTE - CHIEF COMPLAINT QUOTE
pt c/o right inner leg pain, was seen here last week for injury but want to have right leg checked it was not like this when I was here, unable to get appointment soon  A&Ox3, resp wnl, + bruising to right inner thigh, + swelling, on presigel ( blood thinner)

## 2023-08-08 ENCOUNTER — APPOINTMENT (OUTPATIENT)
Dept: ORTHOPEDIC SURGERY | Facility: CLINIC | Age: 78
End: 2023-08-08
Payer: MEDICARE

## 2023-08-08 VITALS
SYSTOLIC BLOOD PRESSURE: 147 MMHG | HEIGHT: 62 IN | WEIGHT: 135 LBS | DIASTOLIC BLOOD PRESSURE: 57 MMHG | HEART RATE: 70 BPM | BODY MASS INDEX: 24.84 KG/M2

## 2023-08-08 DIAGNOSIS — M25.551 PAIN IN RIGHT HIP: ICD-10-CM

## 2023-08-08 PROCEDURE — 99214 OFFICE O/P EST MOD 30 MIN: CPT

## 2023-08-08 PROCEDURE — 73502 X-RAY EXAM HIP UNI 2-3 VIEWS: CPT

## 2023-08-08 NOTE — PHYSICAL EXAM
[de-identified] : GENERAL APPEARANCE: Well-nourished and hydrated, pleasant, alert, and oriented x 3. Appears their stated age.  HEENT: Normocephalic, extraocular eye motion intact. Nasal septum midline. Oral cavity clear. External auditory canal clear.  RESPIRATORY: Breath sounds clear and audible in all lobes. No wheezing, No accessory muscle use. CARDIOVASCULAR: No apparent abnormalities. No lower leg edema. No varicosities. Pedal pulses are palpable. Ecchymosis along the right thigh and lower leg NEUROLOGIC: Sensation is normal, no muscle weakness in the upper or lower extremities. DERMATOLOGIC: No apparent skin lesions, moist, warm, no rash. SPINE: Cervical spine appears normal and moves freely; thoracic spine appears normal and moves freely; lumbosacral spine appears normal and moves freely, normal, nontender. MUSCULOSKELETAL: Hands, wrists, and elbows are normal and move freely, shoulders are normal and move freely.  PSYCHIATRIC: Oriented to person, place, and time, insight and judgement were intact and the affect was normal. [de-identified] : Ambulates normally. Right hip exam showed pain with SLR, ROM is full flexion with 60 degrees external and 30 degrees internal with pain, ASHLYN negative, FADIR negative. Tenderness near the ischial tuberosity. 5/5 motor strength in bilateral lower extremities. Sensory: Intact in bilateral lower extremities. DTRs: Biceps, brachioradialis, triceps, patellar, ankle and plantar 2+ and symmetric bilaterally. Pulses: dorsalis pedis, posterior tibial, femoral, popliteal, and radial 2+ and symmetric bilaterally. [de-identified] : AP pelvis and 2 views of the right hip obtained the office today show no acute fracture or dislocation.  No significant degenerative changes noted.  Nerve stimulator is noted

## 2023-08-08 NOTE — HISTORY OF PRESENT ILLNESS
[Pain Location] : pain [] : right hip [Worsening] : worsening [___ days] : [unfilled] day(s) ago [Standing] : standing [Constant] : ~He/She~ states the symptoms seem to be constant [Sitting] : worsened by sitting [Running] : worsened by running [Walking] : worsened by walking [Acetaminophen] : relieved by acetaminophen [Ice] : relieved by ice [de-identified] : 77 year old female patient presents today for an initial consultation for right hip pain following a visit to the [facility] on 07/30/2023 (9 days ago). The patient states she was walking her dog when he spotted a squirrel and took off on her. She tried to stop him but ended up hurting herself. The patient has an extensive bruise all over the right leg. She is able to walk on it. She ices it. She uses a tib nerve stimulator. She has bladder cancer. Hurts to sit and rise from a seated position. Walking makes it feel better until the night. She does not elevate the leg when she is home. She has stopped her daily activities as to not exacerbate the pain in her right hip. The patient is also having back pain. The patient has osteoporosis. She does weight training and Pilates.  Denies any pain in the groin.  Denies any mechanical symptoms in the hip.  Has tenderness to palpation over the posterior aspect of the leg [Bending] : not worsened by bending [de-identified] : going up and down the stairs, rising on a seated position [de-identified] : Lidocaine Patch

## 2023-08-08 NOTE — DISCUSSION/SUMMARY
[Other: ____] : in [unfilled] [Medication Risks Reviewed] : Medication risks reviewed [Surgical risks reviewed] : Surgical risks reviewed [de-identified] : Patient is a 77-year-old female with a likely right hamstring tear presenting today for initial evaluation.  I have recommended conservative treatment at this time.  I have ordered an MRI of her right femur for further evaluation and management of the extent of the tear.  We discussed low impact activity and exercises.  We discussed the use of a cane or assistive device.  She is continue take Tylenol as needed for the pain.  She should continue use lidocaine patches as needed.  We discussed rest ice and elevation.  I will see her back after the MRI for repeat evaluation and management.  All questions were asked and answered.

## 2023-08-16 ENCOUNTER — TRANSCRIPTION ENCOUNTER (OUTPATIENT)
Age: 78
End: 2023-08-16

## 2023-08-22 ENCOUNTER — APPOINTMENT (OUTPATIENT)
Dept: MRI IMAGING | Facility: CLINIC | Age: 78
End: 2023-08-22

## 2023-08-23 ENCOUNTER — APPOINTMENT (OUTPATIENT)
Dept: INTERNAL MEDICINE | Facility: CLINIC | Age: 78
End: 2023-08-23
Payer: MEDICARE

## 2023-08-23 ENCOUNTER — LABORATORY RESULT (OUTPATIENT)
Age: 78
End: 2023-08-23

## 2023-08-23 VITALS
OXYGEN SATURATION: 97 % | HEIGHT: 62 IN | DIASTOLIC BLOOD PRESSURE: 72 MMHG | WEIGHT: 135 LBS | BODY MASS INDEX: 24.84 KG/M2 | SYSTOLIC BLOOD PRESSURE: 112 MMHG | HEART RATE: 78 BPM

## 2023-08-23 DIAGNOSIS — S30.1XXA CONTUSION OF ABDOMINAL WALL, INITIAL ENCOUNTER: ICD-10-CM

## 2023-08-23 DIAGNOSIS — S76.319A STRAIN OF MUSCLE, FASCIA AND TENDON OF THE POSTERIOR MUSCLE GROUP AT THIGH LEVEL, UNSPECIFIED THIGH, INITIAL ENCOUNTER: ICD-10-CM

## 2023-08-23 DIAGNOSIS — Z01.818 ENCOUNTER FOR OTHER PREPROCEDURAL EXAMINATION: ICD-10-CM

## 2023-08-23 DIAGNOSIS — Z95.5 PRESENCE OF CORONARY ANGIOPLASTY IMPLANT AND GRAFT: ICD-10-CM

## 2023-08-23 DIAGNOSIS — I25.10 ATHEROSCLEROTIC HEART DISEASE OF NATIVE CORONARY ARTERY W/OUT ANGINA PECTORIS: ICD-10-CM

## 2023-08-23 PROCEDURE — 99215 OFFICE O/P EST HI 40 MIN: CPT | Mod: 25

## 2023-08-23 PROCEDURE — 36415 COLL VENOUS BLD VENIPUNCTURE: CPT

## 2023-08-23 RX ORDER — MITOMYCIN 40 MG/80ML
40 INJECTION, POWDER, LYOPHILIZED, FOR SOLUTION INTRAVENOUS
Qty: 1 | Refills: 2 | Status: DISCONTINUED | COMMUNITY
Start: 2022-10-04 | End: 2023-08-23

## 2023-08-23 RX ORDER — MITOMYCIN 40 MG/80ML
40 INJECTION, POWDER, LYOPHILIZED, FOR SOLUTION INTRAVENOUS
Qty: 1 | Refills: 2 | Status: DISCONTINUED | COMMUNITY
Start: 2022-11-08 | End: 2023-08-23

## 2023-08-23 RX ORDER — GEMCITABINE HYDROCHLORIDE 2 G/52.6ML
2 INJECTION INTRAVENOUS
Qty: 1 | Refills: 2 | Status: DISCONTINUED | COMMUNITY
Start: 2021-01-07 | End: 2023-08-23

## 2023-08-23 RX ORDER — GEMCITABINE HYDROCHLORIDE 2 G/52.6ML
2 INJECTION INTRAVENOUS
Qty: 1 | Refills: 2 | Status: DISCONTINUED | COMMUNITY
Start: 2021-01-21 | End: 2023-08-23

## 2023-08-23 RX ORDER — MITOMYCIN 40 MG/80ML
40 INJECTION, POWDER, LYOPHILIZED, FOR SOLUTION INTRAVENOUS
Qty: 1 | Refills: 2 | Status: DISCONTINUED | COMMUNITY
Start: 2022-11-01 | End: 2023-08-23

## 2023-08-23 RX ORDER — MITOMYCIN 40 MG/80ML
40 INJECTION, POWDER, LYOPHILIZED, FOR SOLUTION INTRAVENOUS
Qty: 1 | Refills: 2 | Status: DISCONTINUED | COMMUNITY
Start: 2023-05-24 | End: 2023-08-23

## 2023-08-23 RX ORDER — SIMVASTATIN 80 MG/1
80 TABLET, FILM COATED ORAL
Qty: 90 | Refills: 3 | Status: DISCONTINUED | COMMUNITY
Start: 2020-03-05 | End: 2023-08-23

## 2023-08-23 RX ORDER — MITOMYCIN 40 MG/80ML
40 INJECTION, POWDER, LYOPHILIZED, FOR SOLUTION INTRAVENOUS
Qty: 1 | Refills: 2 | Status: DISCONTINUED | COMMUNITY
Start: 2023-05-30 | End: 2023-08-23

## 2023-08-23 RX ORDER — MITOMYCIN 40 MG/80ML
40 INJECTION, POWDER, LYOPHILIZED, FOR SOLUTION INTRAVENOUS
Qty: 1 | Refills: 2 | Status: DISCONTINUED | COMMUNITY
Start: 2023-06-06 | End: 2023-08-23

## 2023-08-23 RX ORDER — ATORVASTATIN CALCIUM 40 MG/1
40 TABLET, FILM COATED ORAL
Refills: 0 | Status: ACTIVE | COMMUNITY

## 2023-08-23 RX ORDER — GEMCITABINE HYDROCHLORIDE 2 G/52.6ML
2 INJECTION INTRAVENOUS
Qty: 1 | Refills: 2 | Status: DISCONTINUED | COMMUNITY
Start: 2019-11-25 | End: 2023-08-23

## 2023-08-23 RX ORDER — MITOMYCIN 40 MG/80ML
40 INJECTION, POWDER, LYOPHILIZED, FOR SOLUTION INTRAVENOUS
Qty: 1 | Refills: 2 | Status: DISCONTINUED | COMMUNITY
Start: 2022-11-15 | End: 2023-08-23

## 2023-08-23 RX ORDER — METOPROLOL SUCCINATE 25 MG/1
25 TABLET, EXTENDED RELEASE ORAL DAILY
Refills: 0 | Status: ACTIVE | COMMUNITY

## 2023-08-23 RX ORDER — PRASUGREL HYDROCHLORIDE 5 MG/1
5 TABLET, COATED ORAL
Refills: 0 | Status: ACTIVE | COMMUNITY

## 2023-08-23 RX ORDER — GEMCITABINE HYDROCHLORIDE 2 G/52.6ML
2 INJECTION INTRAVENOUS
Qty: 1 | Refills: 0 | Status: DISCONTINUED | COMMUNITY
Start: 2022-03-09 | End: 2023-08-23

## 2023-08-23 RX ORDER — GEMCITABINE HYDROCHLORIDE 2 G/52.6ML
2 INJECTION INTRAVENOUS
Qty: 1 | Refills: 0 | Status: DISCONTINUED | COMMUNITY
Start: 2022-03-16 | End: 2023-08-23

## 2023-08-23 NOTE — HISTORY OF PRESENT ILLNESS
[Coronary Artery Disease] : coronary artery disease [No Pertinent Pulmonary History] : no history of asthma, COPD, sleep apnea, or smoking [No Adverse Anesthesia Reaction] : no adverse anesthesia reaction in self or family member [NSAIDs: _____] : NSAIDs: [unfilled] [FreeTextEntry1] : CATARACT [FreeTextEntry4] : 77yo  FEMALE WITH HX HTN CHOL AND BLADDER CANCER CAD JHX OF STENTS  FOR MED CLEARANCE FOR  RIGHT HAMSTING  INJURY

## 2023-08-23 NOTE — PHYSICAL EXAM
[No Acute Distress] : no acute distress [Well Nourished] : well nourished [Well Developed] : well developed [Well-Appearing] : well-appearing [Normal Sclera/Conjunctiva] : normal sclera/conjunctiva [PERRL] : pupils equal round and reactive to light [EOMI] : extraocular movements intact [Normal Outer Ear/Nose] : the outer ears and nose were normal in appearance [Normal Oropharynx] : the oropharynx was normal [No JVD] : no jugular venous distention [No Lymphadenopathy] : no lymphadenopathy [Supple] : supple [Thyroid Normal, No Nodules] : the thyroid was normal and there were no nodules present [No Respiratory Distress] : no respiratory distress  [No Accessory Muscle Use] : no accessory muscle use [Clear to Auscultation] : lungs were clear to auscultation bilaterally [Normal Rate] : normal rate  [Regular Rhythm] : with a regular rhythm [Normal S1, S2] : normal S1 and S2 [No Murmur] : no murmur heard [No Carotid Bruits] : no carotid bruits [No Abdominal Bruit] : a ~M bruit was not heard ~T in the abdomen [No Varicosities] : no varicosities [Pedal Pulses Present] : the pedal pulses are present [No Edema] : there was no peripheral edema [No Palpable Aorta] : no palpable aorta [No Extremity Clubbing/Cyanosis] : no extremity clubbing/cyanosis [Soft] : abdomen soft [Non Tender] : non-tender [Non-distended] : non-distended [No Masses] : no abdominal mass palpated [No HSM] : no HSM [Normal Bowel Sounds] : normal bowel sounds [Normal Posterior Cervical Nodes] : no posterior cervical lymphadenopathy [Normal Anterior Cervical Nodes] : no anterior cervical lymphadenopathy [No CVA Tenderness] : no CVA  tenderness [No Spinal Tenderness] : no spinal tenderness [No Joint Swelling] : no joint swelling [Grossly Normal Strength/Tone] : grossly normal strength/tone [No Rash] : no rash [Coordination Grossly Intact] : coordination grossly intact [No Focal Deficits] : no focal deficits [Normal Gait] : normal gait [Deep Tendon Reflexes (DTR)] : deep tendon reflexes were 2+ and symmetric [Normal Affect] : the affect was normal [Normal Insight/Judgement] : insight and judgment were intact [Normal] : affect was normal and insight and judgment were intact [de-identified] : LEFT LEG  IN A BOOT

## 2023-08-23 NOTE — PLAN
[FreeTextEntry1] : 77yo  FEMALE WITH HX HTN CHOL AND BLADDER CANCER  CAD S/P STENTS FOR MED CLEARANCE FOR   RIGHT HAMSTRING INJURY PT HAS SEEN HER CARDIOLOIGSIT AND IS OFF HER ELIQUIS SECODNARY TO LARGE HEMATOMAS PT IS MEDICALLY CLEAR FOR PROCEDURE  WILL FOLLOWUP

## 2023-08-27 LAB
ALBUMIN SERPL ELPH-MCNC: 4.7 G/DL
ALP BLD-CCNC: 74 U/L
ALT SERPL-CCNC: 14 U/L
ANION GAP SERPL CALC-SCNC: 12 MMOL/L
APPEARANCE: CLEAR
APTT BLD: 32.8 SEC
AST SERPL-CCNC: 15 U/L
BILIRUB SERPL-MCNC: 0.7 MG/DL
BILIRUBIN URINE: ABNORMAL
BLOOD URINE: NEGATIVE
BUN SERPL-MCNC: 46 MG/DL
CALCIUM SERPL-MCNC: 9.8 MG/DL
CHLORIDE SERPL-SCNC: 104 MMOL/L
CO2 SERPL-SCNC: 22 MMOL/L
COLOR: ABNORMAL
CREAT SERPL-MCNC: 1.56 MG/DL
EGFR: 34 ML/MIN/1.73M2
GLUCOSE QUALITATIVE U: NEGATIVE MG/DL
GLUCOSE SERPL-MCNC: 107 MG/DL
INR PPP: 0.96 RATIO
KETONES URINE: NEGATIVE MG/DL
LEUKOCYTE ESTERASE URINE: ABNORMAL
NITRITE URINE: POSITIVE
PH URINE: 5
POTASSIUM SERPL-SCNC: 4.9 MMOL/L
PROT SERPL-MCNC: 7.5 G/DL
PROTEIN URINE: 30 MG/DL
PT BLD: 11 SEC
SODIUM SERPL-SCNC: 138 MMOL/L
SPECIFIC GRAVITY URINE: 1.03
UROBILINOGEN URINE: 1 MG/DL

## 2023-09-14 ENCOUNTER — TRANSCRIPTION ENCOUNTER (OUTPATIENT)
Age: 78
End: 2023-09-14

## 2023-11-28 ENCOUNTER — OFFICE (OUTPATIENT)
Dept: URBAN - METROPOLITAN AREA CLINIC 104 | Facility: CLINIC | Age: 78
Setting detail: OPHTHALMOLOGY
End: 2023-11-28
Payer: MEDICARE

## 2023-11-28 DIAGNOSIS — Z96.1: ICD-10-CM

## 2023-11-28 DIAGNOSIS — H01.004: ICD-10-CM

## 2023-11-28 DIAGNOSIS — H01.002: ICD-10-CM

## 2023-11-28 DIAGNOSIS — H01.001: ICD-10-CM

## 2023-11-28 DIAGNOSIS — H35.033: ICD-10-CM

## 2023-11-28 DIAGNOSIS — H01.005: ICD-10-CM

## 2023-11-28 DIAGNOSIS — H43.391: ICD-10-CM

## 2023-11-28 DIAGNOSIS — H43.811: ICD-10-CM

## 2023-11-28 PROBLEM — G43.109 MIGRAINE-W/ AURA: Status: ACTIVE | Noted: 2023-11-28

## 2023-11-28 PROCEDURE — 92014 COMPRE OPH EXAM EST PT 1/>: CPT | Performed by: SPECIALIST

## 2023-11-28 ASSESSMENT — LID EXAM ASSESSMENTS
OS_BLEPHARITIS: LLL LUL 2+
OD_BLEPHARITIS: RLL RUL 2+

## 2023-11-28 ASSESSMENT — CONFRONTATIONAL VISUAL FIELD TEST (CVF)
OS_FINDINGS: FULL
OD_FINDINGS: FULL

## 2023-12-07 ENCOUNTER — APPOINTMENT (OUTPATIENT)
Dept: UROLOGY | Facility: CLINIC | Age: 78
End: 2023-12-07

## 2024-01-08 ENCOUNTER — APPOINTMENT (OUTPATIENT)
Dept: INTERNAL MEDICINE | Facility: CLINIC | Age: 79
End: 2024-01-08
Payer: MEDICARE

## 2024-01-08 VITALS
SYSTOLIC BLOOD PRESSURE: 152 MMHG | WEIGHT: 130 LBS | HEIGHT: 62 IN | BODY MASS INDEX: 23.92 KG/M2 | HEART RATE: 67 BPM | OXYGEN SATURATION: 95 % | DIASTOLIC BLOOD PRESSURE: 60 MMHG

## 2024-01-08 DIAGNOSIS — Z13.29 ENCOUNTER FOR SCREENING FOR OTHER SUSPECTED ENDOCRINE DISORDER: ICD-10-CM

## 2024-01-08 DIAGNOSIS — E78.5 HYPERLIPIDEMIA, UNSPECIFIED: ICD-10-CM

## 2024-01-08 DIAGNOSIS — S76.301A UNSPECIFIED INJURY OF MUSCLE, FASCIA AND TENDON OF THE POSTERIOR MUSCLE GROUP AT THIGH LEVEL, RIGHT THIGH, INITIAL ENCOUNTER: ICD-10-CM

## 2024-01-08 DIAGNOSIS — E78.00 PURE HYPERCHOLESTEROLEMIA, UNSPECIFIED: ICD-10-CM

## 2024-01-08 DIAGNOSIS — I10 ESSENTIAL (PRIMARY) HYPERTENSION: ICD-10-CM

## 2024-01-08 PROCEDURE — 36415 COLL VENOUS BLD VENIPUNCTURE: CPT

## 2024-01-08 PROCEDURE — 99215 OFFICE O/P EST HI 40 MIN: CPT | Mod: 25

## 2024-01-10 DIAGNOSIS — M35.3 POLYMYALGIA RHEUMATICA: ICD-10-CM

## 2024-01-10 LAB
ALBUMIN SERPL ELPH-MCNC: 4.2 G/DL
ALP BLD-CCNC: 85 U/L
ALT SERPL-CCNC: 15 U/L
ANION GAP SERPL CALC-SCNC: 10 MMOL/L
AST SERPL-CCNC: 13 U/L
BASOPHILS # BLD AUTO: 0.02 K/UL
BASOPHILS NFR BLD AUTO: 0.2 %
BILIRUB SERPL-MCNC: 0.4 MG/DL
BUN SERPL-MCNC: 34 MG/DL
CALCIUM SERPL-MCNC: 9.7 MG/DL
CHLORIDE SERPL-SCNC: 102 MMOL/L
CHOLEST SERPL-MCNC: 231 MG/DL
CK SERPL-CCNC: 50 U/L
CO2 SERPL-SCNC: 25 MMOL/L
CREAT SERPL-MCNC: 1.08 MG/DL
EGFR: 53 ML/MIN/1.73M2
EOSINOPHIL # BLD AUTO: 0.19 K/UL
EOSINOPHIL NFR BLD AUTO: 2.4 %
ERYTHROCYTE [SEDIMENTATION RATE] IN BLOOD BY WESTERGREN METHOD: 54 MM/HR
GLUCOSE SERPL-MCNC: 81 MG/DL
HCT VFR BLD CALC: 34.3 %
HDLC SERPL-MCNC: 38 MG/DL
HGB BLD-MCNC: 10.9 G/DL
IMM GRANULOCYTES NFR BLD AUTO: 0.4 %
LDLC SERPL CALC-MCNC: 160 MG/DL
LYMPHOCYTES # BLD AUTO: 0.93 K/UL
LYMPHOCYTES NFR BLD AUTO: 11.5 %
MAN DIFF?: NORMAL
MCHC RBC-ENTMCNC: 29.5 PG
MCHC RBC-ENTMCNC: 31.8 GM/DL
MCV RBC AUTO: 93 FL
MONOCYTES # BLD AUTO: 0.59 K/UL
MONOCYTES NFR BLD AUTO: 7.3 %
NEUTROPHILS # BLD AUTO: 6.3 K/UL
NEUTROPHILS NFR BLD AUTO: 78.2 %
NONHDLC SERPL-MCNC: 193 MG/DL
PLATELET # BLD AUTO: 204 K/UL
POTASSIUM SERPL-SCNC: 5.9 MMOL/L
PROT SERPL-MCNC: 7.3 G/DL
RBC # BLD: 3.69 M/UL
RBC # FLD: 12.6 %
RHEUMATOID FACT SER QL: 12 IU/ML
SODIUM SERPL-SCNC: 137 MMOL/L
TRIGL SERPL-MCNC: 182 MG/DL
TSH SERPL-ACNC: 2.13 UIU/ML
WBC # FLD AUTO: 8.06 K/UL

## 2024-01-13 DIAGNOSIS — Z86.39 PERSONAL HISTORY OF OTHER ENDOCRINE, NUTRITIONAL AND METABOLIC DISEASE: ICD-10-CM

## 2024-01-13 DIAGNOSIS — E87.5 HYPERKALEMIA: ICD-10-CM

## 2024-01-13 LAB
ANION GAP SERPL CALC-SCNC: 16 MMOL/L
BUN SERPL-MCNC: 33 MG/DL
CALCIUM SERPL-MCNC: 9.6 MG/DL
CHLORIDE SERPL-SCNC: 102 MMOL/L
CO2 SERPL-SCNC: 20 MMOL/L
CREAT SERPL-MCNC: 1.06 MG/DL
CRP SERPL-MCNC: 49 MG/L
EGFR: 54 ML/MIN/1.73M2
GLUCOSE SERPL-MCNC: 79 MG/DL
POTASSIUM SERPL-SCNC: 6.1 MMOL/L
SODIUM SERPL-SCNC: 138 MMOL/L

## 2024-01-15 LAB — POTASSIUM SERPL-SCNC: 5.2 MMOL/L

## 2024-01-19 ENCOUNTER — APPOINTMENT (OUTPATIENT)
Dept: RHEUMATOLOGY | Facility: CLINIC | Age: 79
End: 2024-01-19
Payer: MEDICARE

## 2024-01-19 VITALS
RESPIRATION RATE: 17 BRPM | DIASTOLIC BLOOD PRESSURE: 62 MMHG | WEIGHT: 130 LBS | BODY MASS INDEX: 23.92 KG/M2 | TEMPERATURE: 98 F | HEART RATE: 68 BPM | SYSTOLIC BLOOD PRESSURE: 120 MMHG | HEIGHT: 62 IN

## 2024-01-19 DIAGNOSIS — M79.10 MYALGIA, UNSPECIFIED SITE: ICD-10-CM

## 2024-01-19 DIAGNOSIS — K59.00 CONSTIPATION, UNSPECIFIED: ICD-10-CM

## 2024-01-19 PROCEDURE — 99204 OFFICE O/P NEW MOD 45 MIN: CPT

## 2024-01-19 NOTE — ASSESSMENT
Learning About Dental Care for Your Child What is good dental care for your child? It's never too early to start cleaning your child's gums and teeth. Bacteria, like those found in plaque, can lead to dental problems. Plaque is a thin film of bacteria that sticks to teeth above and below the gum line. The bacteria in plaque use sugars in food to make acids. These acids can cause tooth decay and gum disease. Good brushing habits can help to remove bacteria and prevent plaque. And regular teeth cleaning by your child's dentist can remove tartar, which is plaque that has built up and hardened. As part of your child's dental health, give your child healthy foods, including whole grains, vegetables, and fruits. Try to avoid foods that are high in sugar and processed carbohydrates, such as pastries, pasta, and white bread. Healthy eating helps to keep gums healthy and make teeth strong. It also helps your child avoid tooth decay, which can lead to holes (cavities) in the teeth. How can you manage your child's dental care? Birth to 3 years · Make sure that your family practices good dental habits. Keeping your own teeth and gums healthy lowers the risk of passing bacteria from your mouth to your child. Also, avoid sharing spoons and other utensils with your child. · Don't put your baby to bed with a bottle of juice, milk, formula, or other sugary liquid. This raises the chance of tooth decay. · Use a soft cloth to clean your baby's gums. Start a few days after birth, and do this until the first teeth come in. As soon as the teeth come in, clean them with a soft toothbrush. Ask your dentist if it's okay to use a rice-sized amount of fluoride toothpaste. · Experts recommend that children have a dental exam when the first tooth appears or by their first birthday. Ages 3 to 6 years [FreeTextEntry1] : 77 yo woman with CAD s/p 5 stents, HTN, HLD, bladder ca , osteopenia, hearing loss ( age related) presents with acute onset of shoulder/hip girdle pain.  Patient most likely with PMR.    --we have discuss what PMR is and how it can be associated to GCA.  she is aware that HAs and vision changes are worrisone signs and if these were to occur she is to see the ER --she is to start prednisone taper ( 20mg for 1 week, if she tolerated reduce to 15mg for 2 weeks and than to 10mg )  --she is to start calcium and vit d  --we will need her last DEXA from Dr Gertrude Hurst  --will check some serologies however this is very low yield  · Your child can learn how to brush his or her own teeth at about 1years of age. Children should be brushing their own teeth, morning and night, by age 3. You should still supervise and check for proper cleaning. · Give your child a small, soft toothbrush. Use a pea-sized amount of fluoride toothpaste. Encourage your child to watch you and older siblings brush teeth. Teach your child not to swallow the toothpaste. · Talk with your dentist about when and how to floss your child's teeth and to teach your child to floss. · Help children age 3 years and older to stop sucking their fingers, thumbs, or pacifiers. If your child can't stop, see your dentist. [facility] children's dentist is specially trained to treat this problem. Ages 10 to 12 years · A child's teeth should be flossed as soon as the teeth touch each other. Flossing can be hard for a child to learn. Talk with your dentist about the right way to teach your child how to floss. · Your dentist may advise the use of a mouthwash that contains fluoride. But teach your child not to swallow it. · Use disclosing tablets from time to time. They can help you see if any plaque is left on your child's teeth after brushing. These tablets are chewable and will color any plaque left on the teeth after the child brushes. You can buy these at most drugstores. · After your child's permanent teeth begin to appear, talk with your dentist about having dental sealant placed on the molars. Follow-up care is a key part of your child's treatment and safety. Be sure to make and go to all appointments, and call your dentist if your child is having problems. It's also a good idea to know your test results and keep a list of the medicines your child takes. Where can you learn more? Go to http://www.gray.com/ Enter H870 in the search box to learn more about \"Learning About Dental Care for Your Child. \" 
 Current as of: March 25, 2020               Content Version: 12.6 © 2339-3003 [employer]. Care instructions adapted under license by [employer] (which disclaims liability or warranty for this information). If you have questions about a medical condition or this instruction, always ask your healthcare professional. [address] any warranty or liability for your use of this information. Tooth Decay in Children: Care Instructions Your Care Instructions Tooth decay is damage to a tooth caused by plaque. Plaque is a thin film of bacteria that sticks to the teeth above and below the gum line. If plaque isn't removed from the teeth, it can build up and harden into tartar. The bacteria in plaque and tartar use sugars in food to make acids. These acids can cause tooth decay and gum disease. Any part of your child's tooth can decay, from the roots below the gum line to the chewing surface. Decay can affect the outer layer (enamel) and inner layer (dentin) of your child's teeth. The deeper the decay, the worse the damage. Untreated tooth decay will get worse and may lead to tooth loss. If your child has a small hole (cavity), your dentist can repair it by removing the decay and filling the hole. If the tooth has deeper decay, your child may need more treatment. A very badly damaged tooth may have to be removed. Follow-up care is a key part of your child's treatment and safety. Be sure to make and go to all appointments, and call your dentist if your child is having problems. It's also a good idea to know your child's test results and keep a list of the medicines your child takes. How can you care for your child at home? If your child has pain and swelling from a decayed tooth: · Give acetaminophen (Tylenol) or ibuprofen (Advil, Motrin) for pain. Be safe with medicines. Read and follow all instructions on the label. ? Do not give your child two or more pain medicines at the same time unless the doctor told you to. Many pain medicines have acetaminophen, which is Tylenol. Too much acetaminophen (Tylenol) can be harmful. · Put ice or a cold pack on the cheek over the tooth for 10 to 15 minutes at a time. Put a thin cloth between the ice and your child's skin. To prevent tooth decay Your dentist may suggest that your child receive dental care by his or her first birthday. After that, many dentists suggest checkups and cleanings every 6 months. Your dentist may recommend fluoride treatments or a sealant. · Don't put your baby to bed with a bottle of juice, milk, formula, or other sugary liquid. This raises the chance of tooth decay. · Give your toddler liquids in a cup rather than a bottle. Drinking from a bottle makes it more likely that your toddler will start to have tooth decay. · Give your child healthy foods to eat. These include whole grains, vegetables, and fruits. Cheese, yogurt, and milk are good for teeth and make great snacks. · Rinse or brush your child's teeth after he or she eats sugary foods, especially sticky, sweet foods like candy or raisins. · Brush your child's teeth two times a day, morning and night. Floss his or her teeth once a day. · Make sure that your family practices good dental habits. Keep your own teeth and gums healthy. This lowers the risk of giving the bacteria from your mouth to your child. And avoid sharing spoons and other utensils with your child. When should you call for help? Call your dentist now or seek immediate medical care if: 
  · Your child has signs of infection, such as: 
? Increased pain, swelling, warmth, or redness. ? Red streaks on the gum leading from a tooth. ? Pus draining from the gum around a tooth. ? A fever.  
  · Your child has a toothache. Watch closely for changes in your child's health, and be sure to contact your dentist if your child has any problems. Where can you learn more? Go to http://www.gray.com/ Enter F587 in the search box to learn more about \"Tooth Decay in Children: Care Instructions. \" Current as of: March 25, 2020               Content Version: 12.6 © 7259-5869 [employer]. Care instructions adapted under license by [employer] (which disclaims liability or warranty for this information). If you have questions about a medical condition or this instruction, always ask your healthcare professional. [place] [address] any warranty or liability for your use of this information.

## 2024-01-19 NOTE — PHYSICAL EXAM
[General Appearance - Well Nourished] : well nourished [General Appearance - Well Developed] : well developed [Sclera] : the sclera and conjunctiva were normal [Hearing Threshold Finger Rub Not [place]] : hearing was normal [Neck Appearance] : the appearance of the neck was normal [Auscultation Breath Sounds / Voice Sounds] : lungs were clear to auscultation bilaterally [Heart Sounds] : normal S1 and S2 [Nail Clubbing] : no clubbing  or cyanosis of the fingernails [Motor Tone] : muscle strength and tone were normal [] : no rash [Skin Lesions] : no skin lesions [Affect] : the affect was normal [Mood] : the mood was normal [FreeTextEntry1] : mild Heberden nodes and Osman nodes, today on medrol quincy normal ROM of shoudlers and hips

## 2024-01-19 NOTE — HISTORY OF PRESENT ILLNESS
[FreeTextEntry1] : 77 yo woman with history of CAD s/p 5 stents, HTN, HLD, Bladder CA dx 2020 ( had chemo wash out to bladder),osteopenia and  some hearing loss.  Patient states that around Jan 5, 2024, she acutely started to have pain over the bilateral upper arms. the pain was so severe she could not take off her coat or clothes.  she has never had issues with her arms or shoulders in the past.  she than started to feel pain of bilateral thighs with some soreness.  She has hamstring complete tear in july 2023.  she spoke to her PMD and was started on medrol quincy with great response.  she was noted to have elevted ESR 54 CRP 49, mild anemia Hg 10.9.  Her tsh, cpk and RF are normal.  Patient denies any joint pain or swelling.  No prior morning stiffness.  Patent denies any associated HAs, vision changes, diplopia, vision loss, jaw claudication, scalp tenderness, oral lesion, rashes, recent infections, recent fevers.   of note patient was on statins previously ( lipitor for about 2 years and she self d/c when these symptoms started   denies any new alopecia, oral lesions, persistent dry eye or dry mouth, red painful eye, nose bleeding, dysphagia, hoarseness, facial rashes, photosensitivity, sob, cough, cp, hx of serositis, hx low wbc, plts or anemia that required special treatment, , raynauds, weakness ,DVt/PE, miscarriages    PMH: as above Surgery: hamstring repair, appendectomy, carpal tunnel surgery, bunion surgery, Left ankle Fracture   Allergy: bactrim ? 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

## 2024-02-01 LAB
ANA SER IF-ACNC: NEGATIVE
CCP AB SER IA-ACNC: <8 UNITS
CENTROMERE IGG SER-ACNC: <0.2 AL
DSDNA AB SER-ACNC: <12 IU/ML
ENA RNP AB SER IA-ACNC: <0.2 AL
ENA SCL70 IGG SER IA-ACNC: <0.2 AL
ENA SM AB SER IA-ACNC: <0.2 AL
ENA SS-A AB SER IA-ACNC: <0.2 AL
ENA SS-B AB SER IA-ACNC: <0.2 AL
RF+CCP IGG SER-IMP: NEGATIVE

## 2024-02-16 ENCOUNTER — APPOINTMENT (OUTPATIENT)
Dept: RHEUMATOLOGY | Facility: CLINIC | Age: 79
End: 2024-02-16
Payer: MEDICARE

## 2024-02-16 VITALS — HEART RATE: 97 BPM | DIASTOLIC BLOOD PRESSURE: 62 MMHG | SYSTOLIC BLOOD PRESSURE: 120 MMHG | OXYGEN SATURATION: 95 %

## 2024-02-16 VITALS — HEART RATE: 72 BPM

## 2024-02-16 PROCEDURE — 99214 OFFICE O/P EST MOD 30 MIN: CPT

## 2024-02-16 RX ORDER — PREDNISONE 2.5 MG/1
2.5 TABLET ORAL
Qty: 150 | Refills: 2 | Status: ACTIVE | COMMUNITY
Start: 2024-02-16 | End: 1900-01-01

## 2024-02-28 ENCOUNTER — APPOINTMENT (OUTPATIENT)
Dept: DERMATOLOGY | Facility: CLINIC | Age: 79
End: 2024-02-28
Payer: MEDICARE

## 2024-02-28 PROCEDURE — 17000 DESTRUCT PREMALG LESION: CPT

## 2024-02-28 PROCEDURE — 99213 OFFICE O/P EST LOW 20 MIN: CPT | Mod: 25

## 2024-02-28 PROCEDURE — 17003 DESTRUCT PREMALG LES 2-14: CPT

## 2024-03-01 ENCOUNTER — APPOINTMENT (OUTPATIENT)
Dept: RADIOLOGY | Facility: CLINIC | Age: 79
End: 2024-03-01
Payer: MEDICARE

## 2024-03-01 ENCOUNTER — OUTPATIENT (OUTPATIENT)
Dept: OUTPATIENT SERVICES | Facility: HOSPITAL | Age: 79
LOS: 1 days | End: 2024-03-01
Payer: MEDICARE

## 2024-03-01 DIAGNOSIS — M35.3 POLYMYALGIA RHEUMATICA: ICD-10-CM

## 2024-03-01 DIAGNOSIS — Z98.890 OTHER SPECIFIED POSTPROCEDURAL STATES: Chronic | ICD-10-CM

## 2024-03-01 DIAGNOSIS — G56.00 CARPAL TUNNEL SYNDROME, UNSPECIFIED UPPER LIMB: Chronic | ICD-10-CM

## 2024-03-01 DIAGNOSIS — Z90.49 ACQUIRED ABSENCE OF OTHER SPECIFIED PARTS OF DIGESTIVE TRACT: Chronic | ICD-10-CM

## 2024-03-01 DIAGNOSIS — C67.9 MALIGNANT NEOPLASM OF BLADDER, UNSPECIFIED: Chronic | ICD-10-CM

## 2024-03-01 PROCEDURE — 77080 DXA BONE DENSITY AXIAL: CPT | Mod: 26

## 2024-03-01 PROCEDURE — 73130 X-RAY EXAM OF HAND: CPT | Mod: 26,50

## 2024-03-01 PROCEDURE — 77080 DXA BONE DENSITY AXIAL: CPT

## 2024-03-01 PROCEDURE — 73130 X-RAY EXAM OF HAND: CPT

## 2024-03-01 NOTE — PHYSICAL EXAM
[General Appearance - Well Nourished] : well nourished [General Appearance - Well Developed] : well developed [Sclera] : the sclera and conjunctiva were normal [Hearing Threshold Finger Rub Not [place]] : hearing was normal [Neck Appearance] : the appearance of the neck was normal [Auscultation Breath Sounds / Voice Sounds] : lungs were clear to auscultation bilaterally [Heart Sounds] : normal S1 and S2 [Nail Clubbing] : no clubbing  or cyanosis of the fingernails [Motor Tone] : muscle strength and tone were normal [] : no rash [Skin Lesions] : no skin lesions [Affect] : the affect was normal [Mood] : the mood was normal [FreeTextEntry1] : mild Heberden nodes and Osman nodes, today on medrol quincy normal ROM of shoudlers and hips

## 2024-03-01 NOTE — ASSESSMENT
[FreeTextEntry1] : 79 yo woman with CAD s/p 5 stents, HTN, HLD, bladder ca , osteopenia, hearing loss ( age related) presents with acute onset of shoulder/hip girdle pain.  Patient most likely with PMR.    --we have discuss what PMR can be associated to GCA.  she is aware that HAs and vision changes are worrisone signs and if these were to occur she is to see the ER --she will cont prednisone 12.5mg and taper slowly   --will repeat ESR/CRP i 4 weeks --she is to start calcium and vit d  --we will need new dexa  --patient is to see urology and f/u on bladder ca ( missed dec appointment and cystoscopy)  --she is to get xray of the hand ( hx of Giant cell tumor)     -------  -

## 2024-03-01 NOTE — HISTORY OF PRESENT ILLNESS
[FreeTextEntry1] : 79 yo woman with history of CAD s/p 5 stents, HTN, HLD, Bladder CA dx 2020 ( had chemo wash out to bladder),osteopenia and  some hearing loss.  Patient states that around Jan 5, 2024, she acutely started to have pain over the bilateral upper arms. the pain was so severe she could not take off her coat or clothes.  she has never had issues with her arms or shoulders in the past.  she than started to feel pain of bilateral thighs with some soreness.  She has hamstring complete tear in july 2023.  she spoke to her PMD and was started on medrol quincy with great response.  she was noted to have elevted ESR 54 CRP 49, mild anemia Hg 10.9.  Her tsh, cpk and RF are normal.  Patient denies any joint pain or swelling.  No prior morning stiffness.  Patent denies any associated HAs, vision changes, diplopia, vision loss, jaw claudication, scalp tenderness, oral lesion, rashes, recent infections, recent fevers.   of note patient was on statins previously ( lipitor for about 2 years and she self d/c when these symptoms started   denies any new alopecia, oral lesions, persistent dry eye or dry mouth, red painful eye, nose bleeding, dysphagia, hoarseness, facial rashes, photosensitivity, sob, cough, cp, hx of serositis, hx low wbc, plts or anemia that required special treatment, , raynauds, weakness ,DVt/PE, miscarriages    TODAY: patient took prednisone 20mg daily for 7days, followed by 15mg daily for 2 week.  she feels much better.  she than reduced to 10mg and felt some morning stiffness. she has some soreness over  the shoulders in the morning.  she denies any persistent HAs or vision changes.  no jaw claudication.  she has started to walk outside again.  she had hamstring surgery in july 2023.   patient has a hx of bladder ca and has not f/u with urology.  missed her appointment in dec 2023.   she also had a giant cell tumor of the hand.  they had suggested resection nbut she declined and had a different procedure many years ago  DEXA 8/22/2020 ( patient states that she declines treatment ) L1-L4 -0.7 LFN -2.5 RFN -2.6  PMH: as above Surgery: hamstring repair, appendectomy, carpal tunnel surgery, bunion surgery, Left ankle Fracture   Allergy: bactrim ? 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

## 2024-03-01 NOTE — REVIEW OF SYSTEMS
SHELLEY PETIT  86y, Female  Allergy: No Known Allergies      HPI:  86 year old lady with PMHx ITP, diverticulitis, PPM presents after an episode of AMS a/w SOB, RAYA, orthopnea. As per family, this has been recurring for the past 2-3 weeks where the patient has been waking in a state of delirium and screaming out in distress and the episodes usually resolve after a few minutes. However, today this episode was much more prolonged, lasting almost an hour, so the family brought her to the ED for further evaluation. Of note, the patient was worked up by her PMD for possible neurologic etiology and it has been negative thus far. She has not had a chance to see her neurologist yet. Denies cp, palpitations, abd pain, n/v/d/c, numbness, paresthesia, recent travel, sick contacts. Admits to SOB, ELIZABETH, RAYA, orthopnea.    In ED, T 96.7F HR 88 bp 147/62 90% on RA. CTHNC was done due to AMS which came back unremarkable. Patient found to be in CHF and required BiPAP which made her feel better with improvement of saturation and was given lasix 40mg IV x1. BNP on admission was 56387. (04 Sep 2018 20:30)    FAMILY HISTORY:  No pertinent family history in first degree relatives    PAST MEDICAL & SURGICAL HISTORY:  Dementia  Cardiomyopathy  Immune thrombocytopenia  Artificial cardiac pacemaker        VITALS:  T(F): 98, Max: 98 (18 @ 05:45)  HR: 69  BP: 166/73  RR: 18Vital Signs Last 24 Hrs  T(C): 36.7 (13 Sep 2018 05:45), Max: 36.7 (13 Sep 2018 05:45)  T(F): 98 (13 Sep 2018 05:45), Max: 98 (13 Sep 2018 05:45)  HR: 69 (13 Sep 2018 05:45) (67 - 83)  BP: 166/73 (13 Sep 2018 05:45) (108/54 - 166/73)  BP(mean): --  RR: 18 (13 Sep 2018 05:45) (18 - 19)  SpO2: 95% (12 Sep 2018 23:20) (95% - 95%)    TESTS & MEASUREMENTS:                        12.9   7.18  )-----------( 259      ( 13 Sep 2018 06:07 )             39.9     09-    137  |  94<L>  |  33<H>  ----------------------------<  218<H>  3.9   |  25  |  0.9    Ca    8.8      11 Sep 2018 09:45          Urinalysis Basic - ( 13 Sep 2018 06:10 )    Color: Yellow / Appearance: Cloudy / S.020 / pH: x  Gluc: x / Ketone: Negative  / Bili: Negative / Urobili: 0.2 mg/dL   Blood: x / Protein: Trace mg/dL / Nitrite: Negative   Leuk Esterase: Small / RBC: x / WBC 10-25 /HPF   Sq Epi: x / Non Sq Epi: Moderate /HPF / Bacteria: Moderate /HPF          RADIOLOGY & ADDITIONAL TESTS:    ANTIBIOTICS: [Chills] : no chills [Fever] : no fever [Eye Pain] : no eye pain [Red Eyes] : eyes not red [Nosebleeds] : no nosebleeds [Nasal Discharge] : no nasal discharge [Palpitations] : no palpitations [Chest Pain] : no chest pain [Cough] : no cough [SOB on Exertion] : no shortness of breath during exertion [Constipation] : no constipation [Diarrhea] : no diarrhea

## 2024-03-07 ENCOUNTER — APPOINTMENT (OUTPATIENT)
Dept: UROLOGY | Facility: CLINIC | Age: 79
End: 2024-03-07
Payer: MEDICARE

## 2024-03-07 VITALS
WEIGHT: 130 LBS | HEIGHT: 62 IN | HEART RATE: 56 BPM | DIASTOLIC BLOOD PRESSURE: 56 MMHG | BODY MASS INDEX: 23.92 KG/M2 | SYSTOLIC BLOOD PRESSURE: 126 MMHG

## 2024-03-07 DIAGNOSIS — D48.9 NEOPLASM OF UNCERTAIN BEHAVIOR, UNSPECIFIED: ICD-10-CM

## 2024-03-07 LAB
BILIRUB UR QL STRIP: NORMAL
CLARITY UR: CLEAR
COLLECTION METHOD: NORMAL
GLUCOSE UR-MCNC: NORMAL
HCG UR QL: 0.2 EU/DL
HGB UR QL STRIP.AUTO: ABNORMAL
KETONES UR-MCNC: NORMAL
LEUKOCYTE ESTERASE UR QL STRIP: ABNORMAL
NITRITE UR QL STRIP: NORMAL
PH UR STRIP: 5.5
PROT UR STRIP-MCNC: ABNORMAL
SP GR UR STRIP: 1.02

## 2024-03-07 PROCEDURE — 52000 CYSTOURETHROSCOPY: CPT

## 2024-03-07 PROCEDURE — 81003 URINALYSIS AUTO W/O SCOPE: CPT | Mod: QW

## 2024-03-22 ENCOUNTER — APPOINTMENT (OUTPATIENT)
Dept: RHEUMATOLOGY | Facility: CLINIC | Age: 79
End: 2024-03-22
Payer: MEDICARE

## 2024-03-22 VITALS
DIASTOLIC BLOOD PRESSURE: 66 MMHG | TEMPERATURE: 97.6 F | HEART RATE: 70 BPM | OXYGEN SATURATION: 97 % | SYSTOLIC BLOOD PRESSURE: 122 MMHG | HEIGHT: 62 IN | RESPIRATION RATE: 17 BRPM

## 2024-03-22 LAB
CRP SERPL-MCNC: <3 MG/L
ERYTHROCYTE [SEDIMENTATION RATE] IN BLOOD BY WESTERGREN METHOD: 3 MM/HR

## 2024-03-22 PROCEDURE — G2211 COMPLEX E/M VISIT ADD ON: CPT

## 2024-03-22 PROCEDURE — 99214 OFFICE O/P EST MOD 30 MIN: CPT

## 2024-03-22 RX ORDER — METHYLPREDNISOLONE 4 MG/1
4 TABLET ORAL
Qty: 1 | Refills: 0 | Status: DISCONTINUED | COMMUNITY
Start: 2024-01-10 | End: 2024-03-22

## 2024-03-22 NOTE — HISTORY OF PRESENT ILLNESS
[FreeTextEntry1] : 79 yo woman with history of CAD s/p 5 stents, HTN, HLD, Bladder CA dx 2020 ( had chemo wash out to bladder),osteopenia and  some hearing loss.  Patient states that around Jan 5, 2024, she acutely started to have pain over the bilateral upper arms. the pain was so severe she could not take off her coat or clothes.  she has never had issues with her arms or shoulders in the past.  she than started to feel pain of bilateral thighs with some soreness.  She has hamstring complete tear in july 2023.  she spoke to her PMD and was started on medrol quincy with great response.  she was noted to have elevted ESR 54 CRP 49, mild anemia Hg 10.9.  Her tsh, cpk and RF are normal.  Patient denies any joint pain or swelling.  No prior morning stiffness.  Patent denies any associated HAs, vision changes, diplopia, vision loss, jaw claudication, scalp tenderness, oral lesion, rashes, recent infections, recent fevers.   of note patient was on statins previously ( lipitor for about 2 years and she self d/c when these symptoms started   denies any new alopecia, oral lesions, persistent dry eye or dry mouth, red painful eye, nose bleeding, dysphagia, hoarseness, facial rashes, photosensitivity, sob, cough, cp, hx of serositis, hx low wbc, plts or anemia that required special treatment, , raynauds, weakness ,DVt/PE, miscarriages    TODAY: Patient was on prednisone 12.5 on last visit however she had increase bilteral arm pain and increased prednisone chelsey 17.5.  Now she is doing well and is pain free.  denies any HAs, jaw claudication, vision changes.  she saw urology and had cystoscopy and told that she was cancer free.  She did xray of hand and she is noted to have giant cell tumor of the left hand -she does not want to see ortho as this is the same finding for 25 years.  In the past she saw ortho who wanted to ampulate and she decided to do cryosurgery.  Today she is interested in starting OP treatment but would like to discuss with her GYN.   she is on calcium and vit d and excercising  ESR and CRP normal now    patient has a hx of bladder ca and has not f/u with urology.  missed her appointment in dec 2023.   she also had a giant cell tumor of the hand.  they had suggested resection nbut she declined and had a different procedure many years ago  DEXA 8/22/2020 ( patient states that she declines treatment ) L1-L4 -0.7 LFN -2.5 RFN -2.6  PMH: as above Surgery: hamstring repair, appendectomy, carpal tunnel surgery, bunion surgery, Left ankle Fracture   Allergy: bactrim ? 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

## 2024-03-22 NOTE — PHYSICAL EXAM
[General Appearance - Well Nourished] : well nourished [General Appearance - Well Developed] : well developed [Sclera] : the sclera and conjunctiva were normal [Hearing Threshold Finger Rub Not [place]] : hearing was normal [Neck Appearance] : the appearance of the neck was normal [Auscultation Breath Sounds / Voice Sounds] : lungs were clear to auscultation bilaterally [Heart Sounds] : normal S1 and S2 [Nail Clubbing] : no clubbing  or cyanosis of the fingernails [Motor Tone] : muscle strength and tone were normal [] : no rash [Skin Lesions] : no skin lesions [Affect] : the affect was normal [Mood] : the mood was normal [FreeTextEntry1] : mild Heberden nodes and Osman nodes, today on medrol quincy normal ROM of shoudlers and hips

## 2024-03-22 NOTE — ASSESSMENT
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  Patient most likely with PMR.    --we have discuss what PMR can be associated to GCA.  she is aware that HAs and vision changes are worrisome signs and if these were to occur she is to see the ER --she will cont prednisone 17.5mg for total 2 week and taper to 15mg for 2 weeks.  every 2 weeks reduce by 2.5 until she gets to 10mg    --will repeat ESR/CRP prior to next visit  --she is to start calcium and vit d  --had new dexa showing osteopenia. however patient will be on steroids for some time and would benefit from treatment. she will discuss with her GYN   -- bladder ca-- had repeat cystoscopy.  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  patient declines ortho referral      -------  -

## 2024-05-21 ENCOUNTER — APPOINTMENT (OUTPATIENT)
Dept: RHEUMATOLOGY | Facility: CLINIC | Age: 79
End: 2024-05-21

## 2024-05-23 ENCOUNTER — RX RENEWAL (OUTPATIENT)
Age: 79
End: 2024-05-23

## 2024-05-23 RX ORDER — PREDNISONE 5 MG/1
5 TABLET ORAL
Qty: 60 | Refills: 0 | Status: ACTIVE | COMMUNITY
Start: 2024-01-19 | End: 1900-01-01

## 2024-05-29 LAB
CRP SERPL-MCNC: <3 MG/L
ERYTHROCYTE [SEDIMENTATION RATE] IN BLOOD BY WESTERGREN METHOD: 11 MM/HR

## 2024-05-31 ENCOUNTER — APPOINTMENT (OUTPATIENT)
Dept: RHEUMATOLOGY | Facility: CLINIC | Age: 79
End: 2024-05-31
Payer: MEDICARE

## 2024-05-31 VITALS
TEMPERATURE: 98.2 F | SYSTOLIC BLOOD PRESSURE: 126 MMHG | HEIGHT: 62 IN | DIASTOLIC BLOOD PRESSURE: 76 MMHG | OXYGEN SATURATION: 98 % | HEART RATE: 86 BPM

## 2024-05-31 DIAGNOSIS — M81.0 AGE-RELATED OSTEOPOROSIS W/OUT CURRENT PATHOLOGICAL FRACTURE: ICD-10-CM

## 2024-05-31 PROCEDURE — G2211 COMPLEX E/M VISIT ADD ON: CPT

## 2024-05-31 PROCEDURE — 99214 OFFICE O/P EST MOD 30 MIN: CPT

## 2024-05-31 RX ORDER — PREDNISONE 1 MG/1
1 TABLET ORAL DAILY
Qty: 120 | Refills: 2 | Status: ACTIVE | COMMUNITY
Start: 2024-05-31 | End: 1900-01-01

## 2024-06-06 DIAGNOSIS — N18.9 CHRONIC KIDNEY DISEASE, UNSPECIFIED: ICD-10-CM

## 2024-06-06 LAB
25(OH)D3 SERPL-MCNC: 46.4 NG/ML
ALBUMIN SERPL ELPH-MCNC: 4.4 G/DL
ALP BLD-CCNC: 50 U/L
ALT SERPL-CCNC: 20 U/L
ANION GAP SERPL CALC-SCNC: 11 MMOL/L
AST SERPL-CCNC: 15 U/L
BILIRUB SERPL-MCNC: 0.8 MG/DL
BUN SERPL-MCNC: 28 MG/DL
CALCIUM SERPL-MCNC: 9.9 MG/DL
CHLORIDE SERPL-SCNC: 100 MMOL/L
CO2 SERPL-SCNC: 26 MMOL/L
CREAT SERPL-MCNC: 1.47 MG/DL
EGFR: 36 ML/MIN/1.73M2
GLUCOSE SERPL-MCNC: 140 MG/DL
MAGNESIUM SERPL-MCNC: 1.7 MG/DL
POTASSIUM SERPL-SCNC: 4.6 MMOL/L
PROT SERPL-MCNC: 6.9 G/DL
SODIUM SERPL-SCNC: 137 MMOL/L
TSH SERPL-ACNC: 1.4 UIU/ML

## 2024-06-06 RX ORDER — ZOLEDRONIC ACID 5 MG/100ML
5 INJECTION INTRAVENOUS
Qty: 1 | Refills: 0 | Status: DISCONTINUED | COMMUNITY
Start: 2024-05-31 | End: 2024-06-06

## 2024-06-12 ENCOUNTER — TRANSCRIPTION ENCOUNTER (OUTPATIENT)
Age: 79
End: 2024-06-12

## 2024-06-13 ENCOUNTER — EMERGENCY (EMERGENCY)
Facility: HOSPITAL | Age: 79
LOS: 1 days | Discharge: HOME | End: 2024-06-13
Attending: STUDENT IN AN ORGANIZED HEALTH CARE EDUCATION/TRAINING PROGRAM
Payer: MEDICARE

## 2024-06-13 VITALS
OXYGEN SATURATION: 100 % | HEIGHT: 62 IN | RESPIRATION RATE: 18 BRPM | TEMPERATURE: 98 F | DIASTOLIC BLOOD PRESSURE: 76 MMHG | WEIGHT: 131.18 LBS | SYSTOLIC BLOOD PRESSURE: 205 MMHG | HEART RATE: 74 BPM

## 2024-06-13 VITALS
HEART RATE: 70 BPM | SYSTOLIC BLOOD PRESSURE: 172 MMHG | OXYGEN SATURATION: 100 % | RESPIRATION RATE: 20 BRPM | DIASTOLIC BLOOD PRESSURE: 95 MMHG

## 2024-06-13 DIAGNOSIS — Z98.890 OTHER SPECIFIED POSTPROCEDURAL STATES: Chronic | ICD-10-CM

## 2024-06-13 DIAGNOSIS — C67.9 MALIGNANT NEOPLASM OF BLADDER, UNSPECIFIED: Chronic | ICD-10-CM

## 2024-06-13 DIAGNOSIS — G56.00 CARPAL TUNNEL SYNDROME, UNSPECIFIED UPPER LIMB: Chronic | ICD-10-CM

## 2024-06-13 DIAGNOSIS — Z90.49 ACQUIRED ABSENCE OF OTHER SPECIFIED PARTS OF DIGESTIVE TRACT: Chronic | ICD-10-CM

## 2024-06-13 LAB
ALBUMIN SERPL ELPH-MCNC: 4.4 G/DL — SIGNIFICANT CHANGE UP (ref 3.3–5.2)
ALP SERPL-CCNC: 43 U/L — SIGNIFICANT CHANGE UP (ref 40–120)
ALT FLD-CCNC: 19 U/L — SIGNIFICANT CHANGE UP
ANION GAP SERPL CALC-SCNC: 13 MMOL/L — SIGNIFICANT CHANGE UP (ref 5–17)
APPEARANCE UR: CLEAR — SIGNIFICANT CHANGE UP
APTT BLD: 26.5 SEC — SIGNIFICANT CHANGE UP (ref 24.5–35.6)
AST SERPL-CCNC: 25 U/L — SIGNIFICANT CHANGE UP
BASOPHILS # BLD AUTO: 0.03 K/UL — SIGNIFICANT CHANGE UP (ref 0–0.2)
BASOPHILS NFR BLD AUTO: 0.4 % — SIGNIFICANT CHANGE UP (ref 0–2)
BILIRUB SERPL-MCNC: 0.9 MG/DL — SIGNIFICANT CHANGE UP (ref 0.4–2)
BILIRUB UR-MCNC: NEGATIVE — SIGNIFICANT CHANGE UP
BUN SERPL-MCNC: 19.3 MG/DL — SIGNIFICANT CHANGE UP (ref 8–20)
CALCIUM SERPL-MCNC: 9.9 MG/DL — SIGNIFICANT CHANGE UP (ref 8.4–10.5)
CHLORIDE SERPL-SCNC: 94 MMOL/L — LOW (ref 96–108)
CO2 SERPL-SCNC: 25 MMOL/L — SIGNIFICANT CHANGE UP (ref 22–29)
COLOR SPEC: YELLOW — SIGNIFICANT CHANGE UP
CREAT SERPL-MCNC: 1.14 MG/DL — SIGNIFICANT CHANGE UP (ref 0.5–1.3)
DIFF PNL FLD: NEGATIVE — SIGNIFICANT CHANGE UP
EGFR: 49 ML/MIN/1.73M2 — LOW
EOSINOPHIL # BLD AUTO: 0.08 K/UL — SIGNIFICANT CHANGE UP (ref 0–0.5)
EOSINOPHIL NFR BLD AUTO: 1 % — SIGNIFICANT CHANGE UP (ref 0–6)
GLUCOSE SERPL-MCNC: 83 MG/DL — SIGNIFICANT CHANGE UP (ref 70–99)
GLUCOSE UR QL: NEGATIVE MG/DL — SIGNIFICANT CHANGE UP
HCT VFR BLD CALC: 35.2 % — SIGNIFICANT CHANGE UP (ref 34.5–45)
HGB BLD-MCNC: 12.8 G/DL — SIGNIFICANT CHANGE UP (ref 11.5–15.5)
IMM GRANULOCYTES NFR BLD AUTO: 0.3 % — SIGNIFICANT CHANGE UP (ref 0–0.9)
INR BLD: 0.94 RATIO — SIGNIFICANT CHANGE UP (ref 0.85–1.18)
KETONES UR-MCNC: NEGATIVE MG/DL — SIGNIFICANT CHANGE UP
LEUKOCYTE ESTERASE UR-ACNC: NEGATIVE — SIGNIFICANT CHANGE UP
LIDOCAIN IGE QN: 64 U/L — HIGH (ref 22–51)
LYMPHOCYTES # BLD AUTO: 1.23 K/UL — SIGNIFICANT CHANGE UP (ref 1–3.3)
LYMPHOCYTES # BLD AUTO: 16 % — SIGNIFICANT CHANGE UP (ref 13–44)
MAGNESIUM SERPL-MCNC: 1.6 MG/DL — LOW (ref 1.8–2.6)
MCHC RBC-ENTMCNC: 31.8 PG — SIGNIFICANT CHANGE UP (ref 27–34)
MCHC RBC-ENTMCNC: 36.4 GM/DL — HIGH (ref 32–36)
MCV RBC AUTO: 87.6 FL — SIGNIFICANT CHANGE UP (ref 80–100)
MONOCYTES # BLD AUTO: 0.5 K/UL — SIGNIFICANT CHANGE UP (ref 0–0.9)
MONOCYTES NFR BLD AUTO: 6.5 % — SIGNIFICANT CHANGE UP (ref 2–14)
NEUTROPHILS # BLD AUTO: 5.82 K/UL — SIGNIFICANT CHANGE UP (ref 1.8–7.4)
NEUTROPHILS NFR BLD AUTO: 75.8 % — SIGNIFICANT CHANGE UP (ref 43–77)
NITRITE UR-MCNC: NEGATIVE — SIGNIFICANT CHANGE UP
PH UR: 7 — SIGNIFICANT CHANGE UP (ref 5–8)
PLATELET # BLD AUTO: 200 K/UL — SIGNIFICANT CHANGE UP (ref 150–400)
POTASSIUM SERPL-MCNC: 4.2 MMOL/L — SIGNIFICANT CHANGE UP (ref 3.5–5.3)
POTASSIUM SERPL-SCNC: 4.2 MMOL/L — SIGNIFICANT CHANGE UP (ref 3.5–5.3)
PROT SERPL-MCNC: 7.3 G/DL — SIGNIFICANT CHANGE UP (ref 6.6–8.7)
PROT UR-MCNC: NEGATIVE MG/DL — SIGNIFICANT CHANGE UP
PROTHROM AB SERPL-ACNC: 10.4 SEC — SIGNIFICANT CHANGE UP (ref 9.5–13)
RBC # BLD: 4.02 M/UL — SIGNIFICANT CHANGE UP (ref 3.8–5.2)
RBC # FLD: 12 % — SIGNIFICANT CHANGE UP (ref 10.3–14.5)
SODIUM SERPL-SCNC: 132 MMOL/L — LOW (ref 135–145)
SP GR SPEC: 1.01 — SIGNIFICANT CHANGE UP (ref 1–1.03)
TROPONIN T, HIGH SENSITIVITY RESULT: 20 NG/L — SIGNIFICANT CHANGE UP (ref 0–51)
TROPONIN T, HIGH SENSITIVITY RESULT: 22 NG/L — SIGNIFICANT CHANGE UP (ref 0–51)
UROBILINOGEN FLD QL: 0.2 MG/DL — SIGNIFICANT CHANGE UP (ref 0.2–1)
WBC # BLD: 7.68 K/UL — SIGNIFICANT CHANGE UP (ref 3.8–10.5)
WBC # FLD AUTO: 7.68 K/UL — SIGNIFICANT CHANGE UP (ref 3.8–10.5)

## 2024-06-13 PROCEDURE — 85730 THROMBOPLASTIN TIME PARTIAL: CPT

## 2024-06-13 PROCEDURE — 99285 EMERGENCY DEPT VISIT HI MDM: CPT | Mod: 25

## 2024-06-13 PROCEDURE — 71046 X-RAY EXAM CHEST 2 VIEWS: CPT

## 2024-06-13 PROCEDURE — 71046 X-RAY EXAM CHEST 2 VIEWS: CPT | Mod: 26

## 2024-06-13 PROCEDURE — 81003 URINALYSIS AUTO W/O SCOPE: CPT

## 2024-06-13 PROCEDURE — 83690 ASSAY OF LIPASE: CPT

## 2024-06-13 PROCEDURE — 84484 ASSAY OF TROPONIN QUANT: CPT

## 2024-06-13 PROCEDURE — 93010 ELECTROCARDIOGRAM REPORT: CPT

## 2024-06-13 PROCEDURE — 36415 COLL VENOUS BLD VENIPUNCTURE: CPT

## 2024-06-13 PROCEDURE — 93005 ELECTROCARDIOGRAM TRACING: CPT

## 2024-06-13 PROCEDURE — 99285 EMERGENCY DEPT VISIT HI MDM: CPT

## 2024-06-13 PROCEDURE — 83735 ASSAY OF MAGNESIUM: CPT

## 2024-06-13 PROCEDURE — 80053 COMPREHEN METABOLIC PANEL: CPT

## 2024-06-13 PROCEDURE — 85025 COMPLETE CBC W/AUTO DIFF WBC: CPT

## 2024-06-13 PROCEDURE — 85610 PROTHROMBIN TIME: CPT

## 2024-06-13 RX ORDER — ACETAMINOPHEN 500 MG
975 TABLET ORAL ONCE
Refills: 0 | Status: COMPLETED | OUTPATIENT
Start: 2024-06-13 | End: 2024-06-13

## 2024-06-13 RX ADMIN — Medication 975 MILLIGRAM(S): at 20:56

## 2024-06-13 NOTE — ED PROVIDER NOTE - PATIENT PORTAL LINK FT
You can access the FollowMyHealth Patient Portal offered by [facility] by registering at the following website: [email or web address]. By joining [facility]’s FollowMyHealth portal, you will also be able to view your health information using other applications (apps) compatible with our system.

## 2024-06-13 NOTE — ED PROVIDER NOTE - CLINICAL SUMMARY MEDICAL DECISION MAKING FREE TEXT BOX
77yo female with pmh of CAD s/p stents, HTN, PMR, HLD, bladder ca in remission presents with HTN. 77yo female with pmh of CAD s/p stents, HTN, PMR, HLD, bladder ca in remission presents with HTN. Patient with no acute ischemic changes on EKG sinus at a rate of 58  QRS 84 QTc 406, chest x-ray no acute findings, delta Trope to otherwise labs at baseline no UTI patient well-appearing BP improved throughout stay.  Patient stable for DC with follow-up.  Likely increase in blood pressure due to recent change in medication.

## 2024-06-13 NOTE — ED ADULT NURSE NOTE - OBJECTIVE STATEMENT
Pt presents to ED c/o HTN. PM Hx stents x5, bladder CA. Pt states she took her night time dose losartan early. Pt connected to cardiac monitor-NSR on room air. /107 at this time. AOx3, alert and calm. Denies blurry vision. Airway patent RR even unlabored. Denies chest pain and SOB. Pt awaiting CXR.

## 2024-06-13 NOTE — ED ADULT TRIAGE NOTE - CHIEF COMPLAINT QUOTE
pt states she has HTN, she is polymyalgia & last time kidney function was off, HX 5 stents  A&Ox3, resp wnl, denies dizziness or neuro deficits

## 2024-06-13 NOTE — ED PROVIDER NOTE - OBJECTIVE STATEMENT
79yo female with pmh of CAD s/p stents, HTN, PMR, HLD, bladder ca in remission presents with HTN. Pt states she is being evaluated for a new medication by her Rheumatologist and she had bloodwork done which she was told her kidney function wasn't at baseline and to follow up with a nephro this was about 2 weeks ago but she hasn't been able to get an appt. Pt states today she was out with friends took her BP noticed it was high and states her BP is very labile. Pt also states she was taken off of her metoprolol last week. Pt denies fevers/chills, ha, loc, focal neuro deficits, cp/sob/palp, cough, abd pain/n/v/d, urinary symptoms, recent travel

## 2024-06-13 NOTE — ED PROVIDER NOTE - PHYSICAL EXAMINATION
Const: Awake, alert and oriented. In no acute distress. Well appearing.  HEENT: NC/AT. Moist mucous membranes.  Eyes: No scleral icterus. EOMI.  Neck:. Soft and supple. Full ROM without pain.  Cardiac: Regular rate and regular rhythm. +S1/S2. Peripheral pulses 2+ and symmetric. No LE edema.  Resp: Speaking in full sentences. No evidence of respiratory distress. No wheezes, rales or rhonchi.  Abd: Soft, non-tender, non-distended. Normal bowel sounds in all 4 quadrants. No guarding or rebound.  Back: Spine midline and non-tender. No CVAT.  Skin: No rashes, abrasions or lacerations.  Lymph: No cervical lymphadenopathy.  Neuro: A&Ox3, moving all extremities symmetrically, follows commands, motor nae upper and lower ext 5/5, sensory symm and intact CN 2-12 grossly intact, no ataxia, no nystagmus, no dysmetria, no ddk, symm nae, no pronator drift

## 2024-06-21 ENCOUNTER — TRANSCRIPTION ENCOUNTER (OUTPATIENT)
Age: 79
End: 2024-06-21

## 2024-06-21 NOTE — PHYSICAL EXAM
[General Appearance - Well Nourished] : well nourished [General Appearance - Well Developed] : well developed [Sclera] : the sclera and conjunctiva were normal [Hearing Threshold Finger Rub Not [place]] : hearing was normal [Neck Appearance] : the appearance of the neck was normal [Auscultation Breath Sounds / Voice Sounds] : lungs were clear to auscultation bilaterally [Heart Sounds] : normal S1 and S2 [] : no rash [Skin Lesions] : no skin lesions [Affect] : the affect was normal [Mood] : the mood was normal [Motor Tone] : muscle strength and tone were normal [Nail Clubbing] : no clubbing  or cyanosis of the fingernails [FreeTextEntry1] : mild Heberden nodes and Osman nodes, today on medrol quincy normal ROM of shoudlers and hips

## 2024-06-21 NOTE — ASSESSMENT
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  Patient most likely with PMR.    --we have discuss that PMR can be associated to GCA.  she is aware that HAs and vision changes are worrisome signs and if these were to occur she is to see the ER --she is currently on prednisone 5mg and she will taper by 1 mg every 3 weeks.  It seems like she was tapering too quickly previously --will repeat ESR/CRP prior to next visit  --had new dexa showing osteopenia. however patient will be on steroids for some time and would benefit from treatment. we will start reclast .  she is to cont calcium and vit d  -- bladder ca-- had repeat cystoscopy.  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 had cryosurgery many years ago for this.  patient declines ortho referral   --check TSH and vit d .  normal PTh,ca, phos and transglutaminase   -------  -

## 2024-06-21 NOTE — HISTORY OF PRESENT ILLNESS
[FreeTextEntry1] : 77 yo woman with history of CAD s/p 5 stents, HTN, HLD, Bladder CA dx 2020 ( had chemo wash out to bladder),osteopenia and  some hearing loss.  Patient states that around Jan 5, 2024, she acutely started to have pain over the bilateral upper arms. the pain was so severe she could not take off her coat or clothes.  she has never had issues with her arms or shoulders in the past.  she than started to feel pain of bilateral thighs with some soreness.  She has hamstring complete tear in july 2023.  she spoke to her PMD and was started on medrol quincy with great response.  she was noted to have elevted ESR 54 CRP 49, mild anemia Hg 10.9.  Her tsh, cpk and RF are normal.  Patient denies any joint pain or swelling.  No prior morning stiffness.  Patent denies any associated HAs, vision changes, diplopia, vision loss, jaw claudication, scalp tenderness, oral lesion, rashes, recent infections, recent fevers.   of note patient was on statins previously ( lipitor for about 2 years and she self d/c when these symptoms started   denies any new alopecia, oral lesions, persistent dry eye or dry mouth, red painful eye, nose bleeding, dysphagia, hoarseness, facial rashes, photosensitivity, sob, cough, cp, hx of serositis, hx low wbc, plts or anemia that required special treatment, , raynauds, weakness ,DVt/PE, miscarriages    TODAY: Patient is on prednisone 5mg daily for now 2 weeks   denies any HAs, jaw claudication, vision changes.  she saw urology and had cystoscopy and told that she was cancer free.  She did xray of hand and she is noted to have giant cell tumor of the left hand -she does not want to see ortho as this is the same finding for 25 years.  In the past she saw ortho who wanted to amputate, and she decided to do cryosurgery.   she is on calcium and vit d and exercising for osteopenia ESR and CRP normal now    patient has a hx of bladder ca is followed by urology.   she also had a giant cell tumor of the hand.  they had suggested resection nbut she declined and had a different procedure many years ago  DEXA 8/22/2020 ( patient states that she declines treatment ) L1-L4 -0.7 LFN -2.5 RFN -2.6    PMH: as above Surgery: hamstring repair, appendectomy, carpal tunnel surgery, bunion surgery, Left ankle Fracture   Allergy: bactrim ? 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

## 2024-07-02 ENCOUNTER — TRANSCRIPTION ENCOUNTER (OUTPATIENT)
Age: 79
End: 2024-07-02

## 2024-07-02 ENCOUNTER — NON-APPOINTMENT (OUTPATIENT)
Age: 79
End: 2024-07-02

## 2024-07-22 NOTE — PHYSICAL EXAM
I reviewed the progress note and agree with the resident's findings and plan as documented in current encounter.     [Chaperone Present] : A chaperone was present in the examining room during all aspects of the physical examination

## 2024-08-09 ENCOUNTER — APPOINTMENT (OUTPATIENT)
Dept: RHEUMATOLOGY | Facility: CLINIC | Age: 79
End: 2024-08-09

## 2024-08-09 PROCEDURE — 99214 OFFICE O/P EST MOD 30 MIN: CPT

## 2024-08-09 NOTE — ASSESSMENT
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  Patient most likely with PMR.    --we have discuss that PMR can be associated to GCA.  she is aware that HAs and vision changes are worrisome signs and if these were to occur she is to see the ER --she is currently on prednisone 5mg and she will taper by 1 mg every 3 weeks.  once she reaches 3mg -she is to stay on this dose until she sees me.   --will repeat ESR/CRP prior to next visit  --had new dexa showing osteopenia. however patient will be on steroids for some time and would benefit from treatment. she is to cont calcium and vit d and doing excercises.  she would like to hold off on started treatement -- bladder ca-- had repeat cystoscopy.  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 had cryosurgery many years ago for this.  patient declines ortho referral   --monitor renal function as this seem to fluctuate    -------  -

## 2024-08-09 NOTE — HISTORY OF PRESENT ILLNESS
[FreeTextEntry1] : 79 yo woman with history of CAD s/p 5 stents, HTN, HLD, Bladder CA dx 2020 ( had chemo wash out to bladder),osteopenia and  some hearing loss.  Patient states that around Jan 5, 2024, she acutely started to have pain over the bilateral upper arms. the pain was so severe she could not take off her coat or clothes.  she has never had issues with her arms or shoulders in the past.  she than started to feel pain of bilateral thighs with some soreness.  She has hamstring complete tear in july 2023.  she spoke to her PMD and was started on medrol quincy with great response.  she was noted to have elevted ESR 54 CRP 49, mild anemia Hg 10.9.  Her tsh, cpk and RF are normal.  Patient denies any joint pain or swelling.  No prior morning stiffness.  Patent denies any associated HAs, vision changes, diplopia, vision loss, jaw claudication, scalp tenderness, oral lesion, rashes, recent infections, recent fevers.   of note patient was on statins previously ( lipitor for about 2 years and she self d/c when these symptoms started   denies any new alopecia, oral lesions, persistent dry eye or dry mouth, red painful eye, nose bleeding, dysphagia, hoarseness, facial rashes, photosensitivity, sob, cough, cp, hx of serositis, hx low wbc, plts or anemia that required special treatment, , raynauds, weakness ,DVt/PE, miscarriages    TODAY: Patient is on prednisone 5mg daily for now 1 weeks   denies any HAs, jaw claudication, vision changes.  she was able to get prednisone down to 3mg but had a flare and increased it back to 10mg.  Patient has fluctuating CR.  it was 1.47 and on repeat improved to 1.14.  She was undergoing stress and not eat or drinking well.  This has now improved.   -osteopenia: patient is hesitated to start OP treatment.  she is currently on toño/vit d and doing excercises with weights and pilates.  she understands that prednisone can worse osteopenia.  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  In the past she saw ortho who wanted to amputate, and she decided to do cryosurgery.   she is on calcium and vit d and exercising for osteopenia     patient has a hx of bladder ca is followed by urology.   she also had a giant cell tumor of the hand.  they had suggested resection nbut she declined and had a different procedure many years ago  DEXA 8/22/2020 ( patient states that she declines treatment ) L1-L4 -0.7 LFN -2.5 RFN -2.6    PMH: as above Surgery: hamstring repair, appendectomy, carpal tunnel surgery, bunion surgery, Left ankle Fracture   Allergy: bactrim ? 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

## 2024-08-09 NOTE — PHYSICAL EXAM
[General Appearance - Well Nourished] : well nourished [General Appearance - Well Developed] : well developed [Hearing Threshold Finger Rub Not [place]] : hearing was normal [Sclera] : the sclera and conjunctiva were normal [Neck Appearance] : the appearance of the neck was normal [Heart Sounds] : normal S1 and S2 [Auscultation Breath Sounds / Voice Sounds] : lungs were clear to auscultation bilaterally [] : no rash [Skin Lesions] : no skin lesions [Affect] : the affect was normal [Mood] : the mood was normal [Nail Clubbing] : no clubbing  or cyanosis of the fingernails [Motor Tone] : muscle strength and tone were normal [FreeTextEntry1] : mild Heberden nodes and Osman nodes, today on medrol quincy normal ROM of shoudlers and hips

## 2024-08-14 ENCOUNTER — OFFICE (OUTPATIENT)
Dept: URBAN - METROPOLITAN AREA CLINIC 114 | Facility: CLINIC | Age: 79
Setting detail: OPHTHALMOLOGY
End: 2024-08-14
Payer: MEDICARE

## 2024-08-14 DIAGNOSIS — H01.002: ICD-10-CM

## 2024-08-14 DIAGNOSIS — H34.231: ICD-10-CM

## 2024-08-14 DIAGNOSIS — H35.033: ICD-10-CM

## 2024-08-14 DIAGNOSIS — H43.391: ICD-10-CM

## 2024-08-14 DIAGNOSIS — H01.004: ICD-10-CM

## 2024-08-14 DIAGNOSIS — H01.001: ICD-10-CM

## 2024-08-14 DIAGNOSIS — Z96.1: ICD-10-CM

## 2024-08-14 DIAGNOSIS — H01.005: ICD-10-CM

## 2024-08-14 DIAGNOSIS — H43.811: ICD-10-CM

## 2024-08-14 PROCEDURE — 92202 OPSCPY EXTND ON/MAC DRAW: CPT | Performed by: SPECIALIST

## 2024-08-14 PROCEDURE — 92014 COMPRE OPH EXAM EST PT 1/>: CPT | Performed by: SPECIALIST

## 2024-08-14 ASSESSMENT — CONFRONTATIONAL VISUAL FIELD TEST (CVF)
OS_FINDINGS: FULL
OD_FINDINGS: FULL

## 2024-08-14 ASSESSMENT — LID EXAM ASSESSMENTS
OS_BLEPHARITIS: LLL LUL 2+
OD_BLEPHARITIS: RLL RUL 2+

## 2024-08-28 ENCOUNTER — OFFICE (OUTPATIENT)
Dept: URBAN - METROPOLITAN AREA CLINIC 114 | Facility: CLINIC | Age: 79
Setting detail: OPHTHALMOLOGY
End: 2024-08-28
Payer: MEDICARE

## 2024-08-28 DIAGNOSIS — H43.391: ICD-10-CM

## 2024-08-28 DIAGNOSIS — H43.811: ICD-10-CM

## 2024-08-28 DIAGNOSIS — H35.033: ICD-10-CM

## 2024-08-28 DIAGNOSIS — H01.005: ICD-10-CM

## 2024-08-28 DIAGNOSIS — H01.002: ICD-10-CM

## 2024-08-28 DIAGNOSIS — H01.001: ICD-10-CM

## 2024-08-28 DIAGNOSIS — Z96.1: ICD-10-CM

## 2024-08-28 DIAGNOSIS — H34.231: ICD-10-CM

## 2024-08-28 DIAGNOSIS — H01.004: ICD-10-CM

## 2024-08-28 PROCEDURE — 92083 EXTENDED VISUAL FIELD XM: CPT | Performed by: SPECIALIST

## 2024-08-28 PROCEDURE — 99213 OFFICE O/P EST LOW 20 MIN: CPT | Performed by: SPECIALIST

## 2024-08-28 ASSESSMENT — LID EXAM ASSESSMENTS
OD_BLEPHARITIS: RLL RUL 2+
OS_BLEPHARITIS: LLL LUL 2+

## 2024-10-07 ENCOUNTER — APPOINTMENT (OUTPATIENT)
Dept: DERMATOLOGY | Facility: CLINIC | Age: 79
End: 2024-10-07
Payer: MEDICARE

## 2024-10-07 PROCEDURE — 99213 OFFICE O/P EST LOW 20 MIN: CPT

## 2024-11-06 ENCOUNTER — APPOINTMENT (OUTPATIENT)
Dept: INTERNAL MEDICINE | Facility: CLINIC | Age: 79
End: 2024-11-06
Payer: MEDICARE

## 2024-11-06 ENCOUNTER — APPOINTMENT (OUTPATIENT)
Dept: GASTROENTEROLOGY | Facility: CLINIC | Age: 79
End: 2024-11-06
Payer: MEDICARE

## 2024-11-06 VITALS
BODY MASS INDEX: 25.4 KG/M2 | OXYGEN SATURATION: 97 % | WEIGHT: 138 LBS | SYSTOLIC BLOOD PRESSURE: 120 MMHG | DIASTOLIC BLOOD PRESSURE: 70 MMHG | RESPIRATION RATE: 16 BRPM | HEIGHT: 62 IN | HEART RATE: 92 BPM

## 2024-11-06 VITALS
WEIGHT: 138 LBS | SYSTOLIC BLOOD PRESSURE: 110 MMHG | DIASTOLIC BLOOD PRESSURE: 55 MMHG | HEIGHT: 62 IN | BODY MASS INDEX: 25.4 KG/M2

## 2024-11-06 DIAGNOSIS — M35.3 POLYMYALGIA RHEUMATICA: ICD-10-CM

## 2024-11-06 DIAGNOSIS — Z01.818 ENCOUNTER FOR OTHER PREPROCEDURAL EXAMINATION: ICD-10-CM

## 2024-11-06 DIAGNOSIS — R19.5 OTHER FECAL ABNORMALITIES: ICD-10-CM

## 2024-11-06 DIAGNOSIS — K59.00 CONSTIPATION, UNSPECIFIED: ICD-10-CM

## 2024-11-06 DIAGNOSIS — N18.9 CHRONIC KIDNEY DISEASE, UNSPECIFIED: ICD-10-CM

## 2024-11-06 DIAGNOSIS — I25.10 ATHEROSCLEROTIC HEART DISEASE OF NATIVE CORONARY ARTERY W/OUT ANGINA PECTORIS: ICD-10-CM

## 2024-11-06 DIAGNOSIS — I10 ESSENTIAL (PRIMARY) HYPERTENSION: ICD-10-CM

## 2024-11-06 PROCEDURE — 99204 OFFICE O/P NEW MOD 45 MIN: CPT

## 2024-11-06 PROCEDURE — 99215 OFFICE O/P EST HI 40 MIN: CPT

## 2024-11-06 RX ORDER — SODIUM SULFATE, POTASSIUM SULFATE AND MAGNESIUM SULFATE 1.6; 3.13; 17.5 G/177ML; G/177ML; G/177ML
17.5-3.13-1.6 SOLUTION ORAL
Qty: 2 | Refills: 0 | Status: ACTIVE | COMMUNITY
Start: 2024-11-06 | End: 1900-01-01

## 2024-11-08 ENCOUNTER — APPOINTMENT (OUTPATIENT)
Dept: RHEUMATOLOGY | Facility: CLINIC | Age: 79
End: 2024-11-08
Payer: MEDICARE

## 2024-11-08 VITALS
OXYGEN SATURATION: 98 % | HEIGHT: 62 IN | SYSTOLIC BLOOD PRESSURE: 116 MMHG | TEMPERATURE: 97.7 F | DIASTOLIC BLOOD PRESSURE: 68 MMHG | HEART RATE: 82 BPM

## 2024-11-08 DIAGNOSIS — R76.8 OTHER SPECIFIED ABNORMAL IMMUNOLOGICAL FINDINGS IN SERUM: ICD-10-CM

## 2024-11-08 DIAGNOSIS — G89.29 PAIN IN RIGHT SHOULDER: ICD-10-CM

## 2024-11-08 DIAGNOSIS — M25.511 PAIN IN RIGHT SHOULDER: ICD-10-CM

## 2024-11-08 DIAGNOSIS — M25.512 PAIN IN RIGHT SHOULDER: ICD-10-CM

## 2024-11-08 PROCEDURE — 99214 OFFICE O/P EST MOD 30 MIN: CPT

## 2024-11-27 ENCOUNTER — OFFICE (OUTPATIENT)
Dept: URBAN - METROPOLITAN AREA CLINIC 114 | Facility: CLINIC | Age: 79
Setting detail: OPHTHALMOLOGY
End: 2024-11-27
Payer: MEDICARE

## 2024-11-27 DIAGNOSIS — H01.004: ICD-10-CM

## 2024-11-27 DIAGNOSIS — H34.231: ICD-10-CM

## 2024-11-27 DIAGNOSIS — H43.811: ICD-10-CM

## 2024-11-27 DIAGNOSIS — Z96.1: ICD-10-CM

## 2024-11-27 DIAGNOSIS — H01.002: ICD-10-CM

## 2024-11-27 DIAGNOSIS — H35.033: ICD-10-CM

## 2024-11-27 DIAGNOSIS — H01.005: ICD-10-CM

## 2024-11-27 DIAGNOSIS — H01.001: ICD-10-CM

## 2024-11-27 DIAGNOSIS — H43.391: ICD-10-CM

## 2024-11-27 PROCEDURE — 92083 EXTENDED VISUAL FIELD XM: CPT | Performed by: SPECIALIST

## 2024-11-27 PROCEDURE — 99213 OFFICE O/P EST LOW 20 MIN: CPT | Performed by: SPECIALIST

## 2024-11-27 ASSESSMENT — LID EXAM ASSESSMENTS
OD_BLEPHARITIS: RLL RUL 2+
OS_BLEPHARITIS: LLL LUL 2+

## 2024-11-27 ASSESSMENT — CONFRONTATIONAL VISUAL FIELD TEST (CVF)
OD_FINDINGS: FULL
OS_FINDINGS: FULL

## 2024-11-27 ASSESSMENT — VISUAL ACUITY
OD_BCVA: 20/25
OS_BCVA: 20/20

## 2024-11-27 ASSESSMENT — TONOMETRY
OS_IOP_MMHG: 17
OD_IOP_MMHG: 17

## 2025-01-06 ENCOUNTER — APPOINTMENT (OUTPATIENT)
Dept: GASTROENTEROLOGY | Facility: HOSPITAL | Age: 80
End: 2025-01-06

## 2025-01-06 ENCOUNTER — TRANSCRIPTION ENCOUNTER (OUTPATIENT)
Age: 80
End: 2025-01-06

## 2025-01-06 ENCOUNTER — OUTPATIENT (OUTPATIENT)
Dept: OUTPATIENT SERVICES | Facility: HOSPITAL | Age: 80
LOS: 1 days | End: 2025-01-06
Payer: MEDICARE

## 2025-01-06 VITALS
RESPIRATION RATE: 22 BRPM | HEIGHT: 63 IN | OXYGEN SATURATION: 94 % | TEMPERATURE: 98 F | DIASTOLIC BLOOD PRESSURE: 77 MMHG | HEART RATE: 60 BPM | WEIGHT: 138.23 LBS | SYSTOLIC BLOOD PRESSURE: 170 MMHG

## 2025-01-06 VITALS
DIASTOLIC BLOOD PRESSURE: 69 MMHG | SYSTOLIC BLOOD PRESSURE: 153 MMHG | OXYGEN SATURATION: 100 % | HEART RATE: 69 BPM | RESPIRATION RATE: 22 BRPM

## 2025-01-06 DIAGNOSIS — C67.9 MALIGNANT NEOPLASM OF BLADDER, UNSPECIFIED: Chronic | ICD-10-CM

## 2025-01-06 DIAGNOSIS — Z98.890 OTHER SPECIFIED POSTPROCEDURAL STATES: Chronic | ICD-10-CM

## 2025-01-06 DIAGNOSIS — R19.5 OTHER FECAL ABNORMALITIES: ICD-10-CM

## 2025-01-06 DIAGNOSIS — Z90.49 ACQUIRED ABSENCE OF OTHER SPECIFIED PARTS OF DIGESTIVE TRACT: Chronic | ICD-10-CM

## 2025-01-06 DIAGNOSIS — K59.00 CONSTIPATION, UNSPECIFIED: ICD-10-CM

## 2025-01-06 DIAGNOSIS — G56.00 CARPAL TUNNEL SYNDROME, UNSPECIFIED UPPER LIMB: Chronic | ICD-10-CM

## 2025-01-06 PROCEDURE — 45378 DIAGNOSTIC COLONOSCOPY: CPT

## 2025-01-06 DEVICE — NAIL OSTEO 1.5X16MM STRL: Type: IMPLANTABLE DEVICE | Status: FUNCTIONAL

## 2025-01-06 NOTE — PRE-OP CHECKLIST - WEIGHT IN KG
59 y/o female coming to the ER with c/o n/v. A&Ox4. Ambulatory. PMH type 1 diabetes with insulin pump and dexcom on lower abdomen, previous obstructive kidney stone. Patient states she has had 3 days of nausea, vomiting, and decreased po intake. 62.7 61 y/o female coming to the ER with c/o n/v. A&Ox4. Ambulatory. PMH type 1 diabetes with insulin pump and dexcom on lower abdomen, previous obstructive kidney stone. Patient states she has had 3 days of nausea, vomiting, and decreased po intake. Patient states "this happens when my sugar is high." patient current . Abdomen is soft, non distended, non tender. 2 PIV's placed. EKG performed. 99.8F rectally. Patient denies chest pain, fever, chills, diarrhea, urinary symptoms, abdominal pain. Safety measures maintained. Bed in the lowest position. Call bell within reach.  Provider at the bedside. No acute distress noted or further complaints at this time.

## 2025-01-15 ENCOUNTER — LABORATORY RESULT (OUTPATIENT)
Age: 80
End: 2025-01-15

## 2025-01-15 ENCOUNTER — OUTPATIENT (OUTPATIENT)
Dept: OUTPATIENT SERVICES | Facility: HOSPITAL | Age: 80
LOS: 1 days | End: 2025-01-15
Payer: MEDICARE

## 2025-01-15 ENCOUNTER — APPOINTMENT (OUTPATIENT)
Dept: RADIOLOGY | Facility: CLINIC | Age: 80
End: 2025-01-15
Payer: MEDICARE

## 2025-01-15 DIAGNOSIS — Z98.890 OTHER SPECIFIED POSTPROCEDURAL STATES: Chronic | ICD-10-CM

## 2025-01-15 DIAGNOSIS — G56.00 CARPAL TUNNEL SYNDROME, UNSPECIFIED UPPER LIMB: Chronic | ICD-10-CM

## 2025-01-15 DIAGNOSIS — M25.511 PAIN IN RIGHT SHOULDER: ICD-10-CM

## 2025-01-15 DIAGNOSIS — Z90.49 ACQUIRED ABSENCE OF OTHER SPECIFIED PARTS OF DIGESTIVE TRACT: Chronic | ICD-10-CM

## 2025-01-15 PROCEDURE — 73030 X-RAY EXAM OF SHOULDER: CPT | Mod: 26,50

## 2025-01-15 PROCEDURE — 73030 X-RAY EXAM OF SHOULDER: CPT

## 2025-01-24 ENCOUNTER — RESULT REVIEW (OUTPATIENT)
Age: 80
End: 2025-01-24

## 2025-01-24 ENCOUNTER — LABORATORY RESULT (OUTPATIENT)
Age: 80
End: 2025-01-24

## 2025-01-24 ENCOUNTER — APPOINTMENT (OUTPATIENT)
Dept: UROLOGY | Facility: CLINIC | Age: 80
End: 2025-01-24
Payer: MEDICARE

## 2025-01-24 VITALS — HEIGHT: 62 IN | WEIGHT: 138 LBS | BODY MASS INDEX: 25.4 KG/M2

## 2025-01-24 PROCEDURE — 99213 OFFICE O/P EST LOW 20 MIN: CPT

## 2025-01-25 LAB
APPEARANCE: CLEAR
BILIRUBIN URINE: NEGATIVE
BLOOD URINE: ABNORMAL
COLOR: NORMAL
GLUCOSE QUALITATIVE U: NEGATIVE MG/DL
KETONES URINE: ABNORMAL MG/DL
LEUKOCYTE ESTERASE URINE: ABNORMAL
NITRITE URINE: NEGATIVE
PH URINE: 5.5
PROTEIN URINE: 30 MG/DL
SPECIFIC GRAVITY URINE: 1.02
UROBILINOGEN URINE: 1 MG/DL

## 2025-01-27 ENCOUNTER — APPOINTMENT (OUTPATIENT)
Dept: MRI IMAGING | Facility: CLINIC | Age: 80
End: 2025-01-27
Payer: MEDICARE

## 2025-01-27 PROCEDURE — A9585: CPT | Mod: JW

## 2025-01-27 PROCEDURE — 74183 MRI ABD W/O CNTR FLWD CNTR: CPT | Mod: TC

## 2025-01-27 PROCEDURE — 72197 MRI PELVIS W/O & W/DYE: CPT | Mod: TC

## 2025-01-28 LAB — URINE CYTOLOGY: NORMAL

## 2025-01-29 ENCOUNTER — APPOINTMENT (OUTPATIENT)
Dept: UROLOGY | Facility: CLINIC | Age: 80
End: 2025-01-29
Payer: MEDICARE

## 2025-01-29 ENCOUNTER — NON-APPOINTMENT (OUTPATIENT)
Age: 80
End: 2025-01-29

## 2025-01-29 DIAGNOSIS — N28.9 DISORDER OF KIDNEY AND URETER, UNSPECIFIED: ICD-10-CM

## 2025-01-29 LAB
BILIRUB UR QL STRIP: NORMAL
CLARITY UR: CLEAR
COLLECTION METHOD: NORMAL
GLUCOSE UR-MCNC: NORMAL
HCG UR QL: 1 EU/DL
HGB UR QL STRIP.AUTO: ABNORMAL
KETONES UR-MCNC: NORMAL
LEUKOCYTE ESTERASE UR QL STRIP: NORMAL
NITRITE UR QL STRIP: NORMAL
PH UR STRIP: 6
PROT UR STRIP-MCNC: ABNORMAL
SP GR UR STRIP: 1.02

## 2025-01-29 PROCEDURE — 99215 OFFICE O/P EST HI 40 MIN: CPT | Mod: 57,25

## 2025-01-29 PROCEDURE — 52000 CYSTOURETHROSCOPY: CPT

## 2025-01-30 ENCOUNTER — APPOINTMENT (OUTPATIENT)
Dept: RHEUMATOLOGY | Facility: CLINIC | Age: 80
End: 2025-01-30
Payer: MEDICARE

## 2025-01-30 ENCOUNTER — OUTPATIENT (OUTPATIENT)
Dept: OUTPATIENT SERVICES | Facility: HOSPITAL | Age: 80
LOS: 1 days | End: 2025-01-30
Payer: MEDICARE

## 2025-01-30 VITALS — HEIGHT: 62 IN | DIASTOLIC BLOOD PRESSURE: 68 MMHG | SYSTOLIC BLOOD PRESSURE: 126 MMHG

## 2025-01-30 VITALS
TEMPERATURE: 98 F | OXYGEN SATURATION: 99 % | WEIGHT: 134.48 LBS | RESPIRATION RATE: 16 BRPM | HEART RATE: 65 BPM | SYSTOLIC BLOOD PRESSURE: 142 MMHG | DIASTOLIC BLOOD PRESSURE: 64 MMHG | HEIGHT: 62 IN

## 2025-01-30 DIAGNOSIS — I10 ESSENTIAL (PRIMARY) HYPERTENSION: ICD-10-CM

## 2025-01-30 DIAGNOSIS — Z98.890 OTHER SPECIFIED POSTPROCEDURAL STATES: Chronic | ICD-10-CM

## 2025-01-30 DIAGNOSIS — Z98.61 CORONARY ANGIOPLASTY STATUS: Chronic | ICD-10-CM

## 2025-01-30 DIAGNOSIS — Z01.818 ENCOUNTER FOR OTHER PREPROCEDURAL EXAMINATION: ICD-10-CM

## 2025-01-30 DIAGNOSIS — E78.5 HYPERLIPIDEMIA, UNSPECIFIED: ICD-10-CM

## 2025-01-30 DIAGNOSIS — G56.00 CARPAL TUNNEL SYNDROME, UNSPECIFIED UPPER LIMB: Chronic | ICD-10-CM

## 2025-01-30 DIAGNOSIS — Z29.9 ENCOUNTER FOR PROPHYLACTIC MEASURES, UNSPECIFIED: ICD-10-CM

## 2025-01-30 DIAGNOSIS — D49.4 NEOPLASM OF UNSPECIFIED BEHAVIOR OF BLADDER: ICD-10-CM

## 2025-01-30 DIAGNOSIS — M35.3 POLYMYALGIA RHEUMATICA: ICD-10-CM

## 2025-01-30 DIAGNOSIS — C67.9 MALIGNANT NEOPLASM OF BLADDER, UNSPECIFIED: ICD-10-CM

## 2025-01-30 DIAGNOSIS — Z90.49 ACQUIRED ABSENCE OF OTHER SPECIFIED PARTS OF DIGESTIVE TRACT: Chronic | ICD-10-CM

## 2025-01-30 DIAGNOSIS — C67.9 MALIGNANT NEOPLASM OF BLADDER, UNSPECIFIED: Chronic | ICD-10-CM

## 2025-01-30 LAB
A1C WITH ESTIMATED AVERAGE GLUCOSE RESULT: 5.4 % — SIGNIFICANT CHANGE UP (ref 4–5.6)
APPEARANCE UR: CLEAR — SIGNIFICANT CHANGE UP
BACTERIA # UR AUTO: NEGATIVE /HPF — SIGNIFICANT CHANGE UP
BILIRUB UR-MCNC: NEGATIVE — SIGNIFICANT CHANGE UP
BLD GP AB SCN SERPL QL: SIGNIFICANT CHANGE UP
CAST: 2 /LPF — SIGNIFICANT CHANGE UP (ref 0–4)
COLOR SPEC: YELLOW — SIGNIFICANT CHANGE UP
DIFF PNL FLD: NEGATIVE — SIGNIFICANT CHANGE UP
ESTIMATED AVERAGE GLUCOSE: 108 MG/DL — SIGNIFICANT CHANGE UP (ref 68–114)
GLUCOSE UR QL: NEGATIVE MG/DL — SIGNIFICANT CHANGE UP
KETONES UR-MCNC: NEGATIVE MG/DL — SIGNIFICANT CHANGE UP
LEUKOCYTE ESTERASE UR-ACNC: ABNORMAL
NITRITE UR-MCNC: NEGATIVE — SIGNIFICANT CHANGE UP
PH UR: 6 — SIGNIFICANT CHANGE UP (ref 5–8)
PROT UR-MCNC: NEGATIVE MG/DL — SIGNIFICANT CHANGE UP
RBC CASTS # UR COMP ASSIST: 0 /HPF — SIGNIFICANT CHANGE UP (ref 0–4)
SP GR SPEC: 1.01 — SIGNIFICANT CHANGE UP (ref 1–1.03)
SQUAMOUS # UR AUTO: 1 /HPF — SIGNIFICANT CHANGE UP (ref 0–5)
UROBILINOGEN FLD QL: 1 MG/DL — SIGNIFICANT CHANGE UP (ref 0.2–1)
WBC UR QL: 10 /HPF — HIGH (ref 0–5)

## 2025-01-30 PROCEDURE — 86900 BLOOD TYPING SEROLOGIC ABO: CPT

## 2025-01-30 PROCEDURE — 86901 BLOOD TYPING SEROLOGIC RH(D): CPT

## 2025-01-30 PROCEDURE — 99214 OFFICE O/P EST MOD 30 MIN: CPT

## 2025-01-30 PROCEDURE — 81001 URINALYSIS AUTO W/SCOPE: CPT

## 2025-01-30 PROCEDURE — 83036 HEMOGLOBIN GLYCOSYLATED A1C: CPT

## 2025-01-30 PROCEDURE — 87086 URINE CULTURE/COLONY COUNT: CPT

## 2025-01-30 PROCEDURE — 86850 RBC ANTIBODY SCREEN: CPT

## 2025-01-30 PROCEDURE — 93010 ELECTROCARDIOGRAM REPORT: CPT

## 2025-01-30 PROCEDURE — 93005 ELECTROCARDIOGRAM TRACING: CPT

## 2025-01-30 PROCEDURE — 36415 COLL VENOUS BLD VENIPUNCTURE: CPT

## 2025-01-30 PROCEDURE — G0463: CPT

## 2025-01-30 NOTE — H&P PST ADULT - HISTORY OF PRESENT ILLNESS
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 79 year old female with PMHx of former smoker w HTN, HLD, CKD 3b, CAD (s/p stents, on ASA 81),polymyalgia rheumatica and carotid stenosis s/p Right CEA here for PST with non-muscle invasive BC and left ureteral lesion. 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 David on 2/7/25. Medical and cardiac optimization pending

## 2025-01-30 NOTE — H&P PST ADULT - NSICDXFAMILYHX_GEN_ALL_CORE_FT
FAMILY HISTORY:  Father  Still living? Unknown  Family history of early CAD, Age at diagnosis: Age Unknown    Mother  Still living? Unknown  FH: hypertension, Age at diagnosis: Age Unknown    Sibling  Still living? Unknown  Family history of benign bladder tumor, Age at diagnosis: Age Unknown FAMILY HISTORY:  Father  Still living? Unknown  Family history of early CAD, Age at diagnosis: Age Unknown    Mother  Still living? Unknown  FH: hypertension, Age at diagnosis: Age Unknown    Sibling  Still living? Unknown  Family history of benign bladder tumor, Age at diagnosis: Age Unknown

## 2025-01-30 NOTE — H&P PST ADULT - ASSESSMENT
CAPRINI SCORE    AGE RELATED RISK FACTORS                                                             [ ] Age 41-60 years                                            (1 Point)  [ ] Age: 61-74 years                                           (2 Points)                 [ ] Age= 75 years                                                (3 Points)             DISEASE RELATED RISK FACTORS                                                       [ ] Edema in the lower extremities                 (1 Point)                     [ ] Varicose veins                                               (1 Point)                                 [ ] BMI > 25 Kg/m2                                            (1 Point)                                  [ ] Serious infection (ie PNA)                            (1 Point)                     [ ] Lung disease ( COPD, Emphysema)            (1 Point)                                                                          [ ] Acute myocardial infarction                         (1 Point)                  [ ] Congestive heart failure (in the previous month)  (1 Point)         [ ] Inflammatory bowel disease                            (1 Point)                  [ ] Central venous access, PICC or Port               (2 points)       (within the last month)                                                                [ ] Stroke (in the previous month)                        (5 Points)    [ ] Previous or present malignancy                       (2 points)                                                                                                                                                         HEMATOLOGY RELATED FACTORS                                                         [ ] Prior episodes of VTE                                     (3 Points)                     [ ] Positive family history for VTE                      (3 Points)                  [ ] Prothrombin 75375 A                                     (3 Points)                     [ ] Factor V Leiden                                                (3 Points)                        [ ] Lupus anticoagulants                                      (3 Points)                                                           [ ] Anticardiolipin antibodies                              (3 Points)                                                       [ ] High homocysteine in the blood                   (3 Points)                                             [ ] Other congenital or acquired thrombophilia      (3 Points)                                                [ ] Heparin induced thrombocytopenia                  (3 Points)                                        MOBILITY RELATED FACTORS  [ ] Bed rest                                                         (1 Point)  [ ] Plaster cast                                                    (2 points)  [ ] Bed bound for more than 72 hours           (2 Points)    GENDER SPECIFIC FACTORS  [ ] Pregnancy or had a baby within the last month   (1 Point)  [ ] Post-partum < 6 weeks                                   (1 Point)  [ ] Hormonal therapy  or oral contraception   (1 Point)  [ ] History of pregnancy complications              (1 point)  [ ] Unexplained or recurrent              (1 Point)    OTHER RISK FACTORS                                           (1 Point)  [ ] BMI >40, smoking, diabetes requiring insulin, chemotherapy  blood transfusions and length of surgery over 2 hours    SURGERY RELATED RISK FACTORS  [ ]  Section within the last month     (1 Point)  [ ] Minor surgery                                                  (1 Point)  [ ] Arthroscopic surgery                                       (2 Points)  [ ] Planned major surgery lasting more            (2 Points)      than 45 minutes     [ ] Elective hip or knee joint replacement       (5 points)       surgery                                                TRAUMA RELATED RISK FACTORS  [ ] Fracture of the hip, pelvis, or leg                       (5 Points)  [ ] Spinal cord injury resulting in paralysis             (5 points)       (in the previous month)    [ ] Paralysis  (less than 1 month)                             (5 Points)  [ ] Multiple Trauma within 1 month                        (5 Points)    Total Score [        ]    Caprini Score 0-2: Low Risk, NO VTE prophylaxis required for most patients, encourage ambulation  Caprini Score 3-6: Moderate Risk , pharmacologic VTE prophylaxis is indicated for most patients (in the absence of contraindications)  Caprini Score Greater than or =7: High risk, pharmocologic VTE prophylaxis indicated for most patients (in the absence of contraindications)    OPIOID RISK TOOL    TRICIA EACH BOX THAT APPLIES AND ADD TOTALS AT THE END    FAMILY HISTORY OF SUBSTANCE ABUSE                 FEMALE         MALE                                                Alcohol                             [  ]1 pt          [  ]3pts                                               Illegal Drugs                     [  ]2 pts        [  ]3pts                                               Rx Drugs                           [  ]4 pts        [  ]4 pts    PERSONAL HISTORY OF SUBSTANCE ABUSE                                                                                          Alcohol                             [  ]3 pts       [  ]3 pts                                               Illegal Drugs                     [  ]4 pts        [  ]4 pts                                               Rx Drugs                           [  ]5 pts        [  ]5 pts    AGE BETWEEN 16-45 YEARS                                      [  ]1 pt         [  ]1 pt    HISTORY OF PREADOLESCENT   SEXUAL ABUSE                                                             [  ]3 pts        [  ]0pts    PSYCHOLOGICAL DISEASE                     ADD, OCD, Bipolar, Schizophrenia        [  ]2 pts         [  ]2 pts                      Depression                                               [  ]1 pt           [  ]1 pt           SCORING TOTAL   (add numbers and type here)              (***)                                     A score of 3 or lower indicated LOW risk for future opioid abuse  A score of 4 to 7 indicated moderate risk for future opioid abuse  A score of 8 or higher indicates a high risk for opioid abuse                           79 year old female with PMHx of former smoker w HTN, HLD, CKD 3b, CAD (s/p stents, on ASA 81),polymyalgia rheumatica and carotid stenosis s/p Right CEA here for PST with non-muscle invasive BC and left ureteral lesion. 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 David on 25. Medical and cardiac optimization pending     medications reviewed, instructions given on what medications to take and what not to take.   She takes most of her meds in the evening, so no meds to be taken in the AM of surgery.  Asked the patient to consult with Dr. Cosme cardiologist about holding ASA and stop/Hold it accordingly, failure to do so may result in cancellation or postponement of your surgery, pt  agreed and verbalized understanding. , also told the patient as per dr. David ASA need to be held 3 days prior to surgery and asked her to check with cardiologist first.  Asked the pt not to take any NSAID's 5-7 days before surgery and told the pt Tylenol is okay to take for pain, pt verbalized understanding.  stop MVI one week prior to surgery   All other meds can be continued till the night before surgery.         CAPRINI SCORE    AGE RELATED RISK FACTORS                                                             [ ] Age 41-60 years                                            (1 Point)  [ ] Age: 61-74 years                                           (2 Points)                 [x ] Age= 75 years                                                (3 Points)             DISEASE RELATED RISK FACTORS                                                       [ ] Edema in the lower extremities                 (1 Point)                     [ ] Varicose veins                                               (1 Point)                                 [ ] BMI > 25 Kg/m2                                            (1 Point)                                  [ ] Serious infection (ie PNA)                            (1 Point)                     [ ] Lung disease ( COPD, Emphysema)            (1 Point)                                                                          [ ] Acute myocardial infarction                         (1 Point)                  [ ] Congestive heart failure (in the previous month)  (1 Point)         [ ] Inflammatory bowel disease                            (1 Point)                  [ ] Central venous access, PICC or Port               (2 points)       (within the last month)                                                                [ ] Stroke (in the previous month)                        (5 Points)    [ x] Previous or present malignancy                       (2 points)                                                                                                                                                         HEMATOLOGY RELATED FACTORS                                                         [ ] Prior episodes of VTE                                     (3 Points)                     [ ] Positive family history for VTE                      (3 Points)                  [ ] Prothrombin 74944 A                                     (3 Points)                     [ ] Factor V Leiden                                                (3 Points)                        [ ] Lupus anticoagulants                                      (3 Points)                                                           [ ] Anticardiolipin antibodies                              (3 Points)                                                       [ ] High homocysteine in the blood                   (3 Points)                                             [ ] Other congenital or acquired thrombophilia      (3 Points)                                                [ ] Heparin induced thrombocytopenia                  (3 Points)                                        MOBILITY RELATED FACTORS  [ ] Bed rest                                                         (1 Point)  [ ] Plaster cast                                                    (2 points)  [ ] Bed bound for more than 72 hours           (2 Points)    GENDER SPECIFIC FACTORS  [ ] Pregnancy or had a baby within the last month   (1 Point)  [ ] Post-partum < 6 weeks                                   (1 Point)  [ ] Hormonal therapy  or oral contraception   (1 Point)  [ ] History of pregnancy complications              (1 point)  [ ] Unexplained or recurrent              (1 Point)    OTHER RISK FACTORS                                           (1 Point)  [ ] BMI >40, smoking, diabetes requiring insulin, chemotherapy  blood transfusions and length of surgery over 2 hours    SURGERY RELATED RISK FACTORS  [ ]  Section within the last month     (1 Point)  [ ] Minor surgery                                                  (1 Point)  [ ] Arthroscopic surgery                                       (2 Points)  [ x] Planned major surgery lasting more            (2 Points)      than 45 minutes     [ ] Elective hip or knee joint replacement       (5 points)       surgery                                                TRAUMA RELATED RISK FACTORS  [ ] Fracture of the hip, pelvis, or leg                       (5 Points)  [ ] Spinal cord injury resulting in paralysis             (5 points)       (in the previous month)    [ ] Paralysis  (less than 1 month)                             (5 Points)  [ ] Multiple Trauma within 1 month                        (5 Points)    Total Score [     7   ]    Caprini Score 0-2: Low Risk, NO VTE prophylaxis required for most patients, encourage ambulation  Caprini Score 3-6: Moderate Risk , pharmacologic VTE prophylaxis is indicated for most patients (in the absence of contraindications)  Caprini Score Greater than or =7: High risk, pharmocologic VTE prophylaxis indicated for most patients (in the absence of contraindications)    OPIOID RISK TOOL    TRICIA EACH BOX THAT APPLIES AND ADD TOTALS AT THE END    FAMILY HISTORY OF SUBSTANCE ABUSE                 FEMALE         MALE                                                Alcohol                             [  ]1 pt          [  ]3pts                                               Illegal Drugs                     [  ]2 pts        [  ]3pts                                               Rx Drugs                           [  ]4 pts        [  ]4 pts    PERSONAL HISTORY OF SUBSTANCE ABUSE                                                                                          Alcohol                             [  ]3 pts       [  ]3 pts                                               Illegal Drugs                     [  ]4 pts        [  ]4 pts                                               Rx Drugs                           [  ]5 pts        [  ]5 pts    AGE BETWEEN 16-45 YEARS                                      [  ]1 pt         [  ]1 pt    HISTORY OF PREADOLESCENT   SEXUAL ABUSE                                                             [  ]3 pts        [  ]0pts    PSYCHOLOGICAL DISEASE                     ADD, OCD, Bipolar, Schizophrenia        [  ]2 pts         [  ]2 pts                      Depression                                               [  ]1 pt           [  ]1 pt           SCORING TOTAL   (add numbers and type here)              ( 0)                                     A score of 3 or lower indicated LOW risk for future opioid abuse  A score of 4 to 7 indicated moderate risk for future opioid abuse  A score of 8 or higher indicates a high risk for opioid abuse

## 2025-01-30 NOTE — H&P PST ADULT - NSICDXPASTMEDICALHX_GEN_ALL_CORE_FT
PAST MEDICAL HISTORY:  Bladder tumor     Bone tumor (benign) left hand    HLD (hyperlipidemia)     HTN (hypertension)     OA (osteoarthritis) PAST MEDICAL HISTORY:  Bladder tumor     Bone tumor (benign) left hand    H/O carotid stenosis     History of polymyalgia rheumatica     HLD (hyperlipidemia)     HTN (hypertension)     OA (osteoarthritis)

## 2025-01-30 NOTE — H&P PST ADULT - PROBLEM SELECTOR PLAN 5
Bladder biopsy, Left ureteroscopy with Biopsy and a stent by Dr. David on 2/7/25. Medical and cardiac optimization pending

## 2025-01-30 NOTE — H&P PST ADULT - NSICDXPASTSURGICALHX_GEN_ALL_CORE_FT
PAST SURGICAL HISTORY:  Carpal tunnel syndrome     History of bunionectomy     History of cryosurgery left finger    Recurrent malignant neoplasm of bladder     S/P appendectomy PAST SURGICAL HISTORY:  Carpal tunnel syndrome     H/O cataract extraction     H/O colonoscopy     History of bunionectomy     History of cryosurgery left finger    History of right-sided carotid endarterectomy     Post PTCA     Recurrent malignant neoplasm of bladder     S/P appendectomy

## 2025-02-01 LAB
CULTURE RESULTS: SIGNIFICANT CHANGE UP
SPECIMEN SOURCE: SIGNIFICANT CHANGE UP

## 2025-02-06 ENCOUNTER — APPOINTMENT (OUTPATIENT)
Dept: INTERNAL MEDICINE | Facility: CLINIC | Age: 80
End: 2025-02-06
Payer: MEDICARE

## 2025-02-06 VITALS
SYSTOLIC BLOOD PRESSURE: 138 MMHG | BODY MASS INDEX: 25.03 KG/M2 | WEIGHT: 136 LBS | HEIGHT: 62 IN | DIASTOLIC BLOOD PRESSURE: 55 MMHG

## 2025-02-06 DIAGNOSIS — Z01.818 ENCOUNTER FOR OTHER PREPROCEDURAL EXAMINATION: ICD-10-CM

## 2025-02-06 DIAGNOSIS — I25.10 ATHEROSCLEROTIC HEART DISEASE OF NATIVE CORONARY ARTERY W/OUT ANGINA PECTORIS: ICD-10-CM

## 2025-02-06 DIAGNOSIS — I10 ESSENTIAL (PRIMARY) HYPERTENSION: ICD-10-CM

## 2025-02-06 DIAGNOSIS — C67.9 MALIGNANT NEOPLASM OF BLADDER, UNSPECIFIED: ICD-10-CM

## 2025-02-06 PROCEDURE — 99205 OFFICE O/P NEW HI 60 MIN: CPT

## 2025-02-06 PROCEDURE — 99215 OFFICE O/P EST HI 40 MIN: CPT

## 2025-02-07 ENCOUNTER — TRANSCRIPTION ENCOUNTER (OUTPATIENT)
Age: 80
End: 2025-02-07

## 2025-02-07 ENCOUNTER — OUTPATIENT (OUTPATIENT)
Dept: OUTPATIENT SERVICES | Facility: HOSPITAL | Age: 80
LOS: 1 days | End: 2025-02-07
Payer: MEDICARE

## 2025-02-07 ENCOUNTER — APPOINTMENT (OUTPATIENT)
Dept: UROLOGY | Facility: HOSPITAL | Age: 80
End: 2025-02-07

## 2025-02-07 VITALS
RESPIRATION RATE: 18 BRPM | HEART RATE: 69 BPM | DIASTOLIC BLOOD PRESSURE: 56 MMHG | TEMPERATURE: 98 F | SYSTOLIC BLOOD PRESSURE: 145 MMHG | OXYGEN SATURATION: 99 %

## 2025-02-07 VITALS — WEIGHT: 134.48 LBS | HEIGHT: 62 IN

## 2025-02-07 DIAGNOSIS — Z90.49 ACQUIRED ABSENCE OF OTHER SPECIFIED PARTS OF DIGESTIVE TRACT: Chronic | ICD-10-CM

## 2025-02-07 DIAGNOSIS — Z98.890 OTHER SPECIFIED POSTPROCEDURAL STATES: Chronic | ICD-10-CM

## 2025-02-07 DIAGNOSIS — G56.00 CARPAL TUNNEL SYNDROME, UNSPECIFIED UPPER LIMB: Chronic | ICD-10-CM

## 2025-02-07 DIAGNOSIS — Z98.61 CORONARY ANGIOPLASTY STATUS: Chronic | ICD-10-CM

## 2025-02-07 DIAGNOSIS — N28.9 DISORDER OF KIDNEY AND URETER, UNSPECIFIED: ICD-10-CM

## 2025-02-07 DIAGNOSIS — C67.9 MALIGNANT NEOPLASM OF BLADDER, UNSPECIFIED: ICD-10-CM

## 2025-02-07 PROCEDURE — 88305 TISSUE EXAM BY PATHOLOGIST: CPT

## 2025-02-07 PROCEDURE — 88342 IMHCHEM/IMCYTCHM 1ST ANTB: CPT

## 2025-02-07 PROCEDURE — 88341 IMHCHEM/IMCYTCHM EA ADD ANTB: CPT

## 2025-02-07 PROCEDURE — C2617: CPT

## 2025-02-07 PROCEDURE — 88305 TISSUE EXAM BY PATHOLOGIST: CPT | Mod: 26

## 2025-02-07 PROCEDURE — C1758: CPT

## 2025-02-07 PROCEDURE — 88341 IMHCHEM/IMCYTCHM EA ADD ANTB: CPT | Mod: 26

## 2025-02-07 PROCEDURE — 74450 X-RAY URETHRA/BLADDER: CPT | Mod: 26,LT

## 2025-02-07 PROCEDURE — 52354 CYSTOURETERO W/BIOPSY: CPT | Mod: LT

## 2025-02-07 PROCEDURE — 52332 CYSTOSCOPY AND TREATMENT: CPT | Mod: LT

## 2025-02-07 PROCEDURE — C1769: CPT

## 2025-02-07 PROCEDURE — 52204 CYSTOSCOPY W/BIOPSY(S): CPT | Mod: 59

## 2025-02-07 PROCEDURE — 36415 COLL VENOUS BLD VENIPUNCTURE: CPT

## 2025-02-07 PROCEDURE — 52204 CYSTOSCOPY W/BIOPSY(S): CPT

## 2025-02-07 PROCEDURE — 52354 CYSTOURETERO W/BIOPSY: CPT | Mod: LT,22

## 2025-02-07 PROCEDURE — C1887: CPT

## 2025-02-07 PROCEDURE — 88342 IMHCHEM/IMCYTCHM 1ST ANTB: CPT | Mod: 26

## 2025-02-07 PROCEDURE — C1889: CPT

## 2025-02-07 DEVICE — URETERAL CATH AXXCESS OPEN END 6FR 70CM: Type: IMPLANTABLE DEVICE | Site: LEFT | Status: FUNCTIONAL

## 2025-02-07 DEVICE — URETERAL SHEATH NAVIGATOR 11/13FR X 28CM: Type: IMPLANTABLE DEVICE | Site: LEFT | Status: FUNCTIONAL

## 2025-02-07 DEVICE — IMPLANTABLE DEVICE: Type: IMPLANTABLE DEVICE | Site: LEFT | Status: FUNCTIONAL

## 2025-02-07 DEVICE — GUIDEWIRE SENSOR DUAL-FLEX NITINOL STRAIGHT .035" X 150CM: Type: IMPLANTABLE DEVICE | Site: LEFT | Status: FUNCTIONAL

## 2025-02-07 DEVICE — GUIDEWIRE AMPLATZ SUPER-STIFF .035" X 145CM 7CM BENTSON-TYPE: Type: IMPLANTABLE DEVICE | Site: LEFT | Status: FUNCTIONAL

## 2025-02-07 DEVICE — URETERAL CATH IMAGER II BERN 5FR 65CM: Type: IMPLANTABLE DEVICE | Site: LEFT | Status: FUNCTIONAL

## 2025-02-07 RX ORDER — PREDNISONE 5 MG/1
2.5 TABLET ORAL
Refills: 0 | DISCHARGE

## 2025-02-07 RX ORDER — SODIUM CHLORIDE 9 G/ML
1000 INJECTION, SOLUTION INTRAVENOUS
Refills: 0 | Status: DISCONTINUED | OUTPATIENT
Start: 2025-02-07 | End: 2025-02-07

## 2025-02-07 RX ORDER — CEFAZOLIN SODIUM IN 0.9 % NACL 2 G/10 ML
2000 SYRINGE (ML) INTRAVENOUS ONCE
Refills: 0 | Status: DISCONTINUED | OUTPATIENT
Start: 2025-02-07 | End: 2025-02-07

## 2025-02-07 RX ORDER — FENTANYL CITRATE 50 UG/ML
50 INJECTION INTRAMUSCULAR; INTRAVENOUS ONCE
Refills: 0 | Status: DISCONTINUED | OUTPATIENT
Start: 2025-02-07 | End: 2025-02-07

## 2025-02-07 RX ORDER — ACETAMINOPHEN 160 MG/5ML
975 SUSPENSION ORAL ONCE
Refills: 0 | Status: COMPLETED | OUTPATIENT
Start: 2025-02-07 | End: 2025-02-07

## 2025-02-07 RX ORDER — LOSARTAN POTASSIUM 100 MG
1 TABLET ORAL
Refills: 0 | DISCHARGE

## 2025-02-07 RX ORDER — HYDROMORPHONE HYDROCHLORIDE 4 MG/ML
0.5 INJECTION, SOLUTION INTRAMUSCULAR; INTRAVENOUS; SUBCUTANEOUS ONCE
Refills: 0 | Status: DISCONTINUED | OUTPATIENT
Start: 2025-02-07 | End: 2025-02-07

## 2025-02-07 RX ORDER — ATORVASTATIN CALCIUM 80 MG/1
1 TABLET, FILM COATED ORAL
Refills: 0 | DISCHARGE

## 2025-02-07 RX ADMIN — ACETAMINOPHEN 975 MILLIGRAM(S): 160 SUSPENSION ORAL at 11:49

## 2025-02-07 NOTE — ASU DISCHARGE PLAN (ADULT/PEDIATRIC) - NS MD DC FALL RISK RISK
For information on Fall & Injury Prevention, visit: [email or web address] OR  [email or web address] OR  https://www.cdc.gov/steadi/patient.html

## 2025-02-07 NOTE — BRIEF OPERATIVE NOTE - NSICDXBRIEFPOSTOP_GEN_ALL_CORE_FT
POST-OP DIAGNOSIS:  Bladder tumor 07-Feb-2025 15:51:11  Chencho David  Malignant tumor of left ureter 07-Feb-2025 15:51:21  Chencho David

## 2025-02-07 NOTE — BRIEF OPERATIVE NOTE - OPERATION/FINDINGS
Bladder dome lesion consistent with pre-operative cystoscopy biopsied and fulgurated. Left ureteral lesion biopsied and ureteral stent left in place.

## 2025-02-07 NOTE — BRIEF OPERATIVE NOTE - NSICDXBRIEFPREOP_GEN_ALL_CORE_FT
PRE-OP DIAGNOSIS:  Lesion of urinary bladder 07-Feb-2025 15:50:34  Chencho David  Malignant tumor of left ureter 07-Feb-2025 15:50:52  Chencho David

## 2025-02-07 NOTE — ASU DISCHARGE PLAN (ADULT/PEDIATRIC) - CARE PROVIDER_API CALL
Chencho David  Urology  [address], [address]  [place] [address]  Phone: [phone]  Fax: [phone]  Follow Up Time: 1 week

## 2025-02-07 NOTE — BRIEF OPERATIVE NOTE - NSICDXBRIEFPROCEDURE_GEN_ALL_CORE_FT
PROCEDURES:  Cystoscopy, with retrograde pyelogram and bladder biopsy 07-Feb-2025 15:49:20  Chencho David  Cystourethroscopy, with ureteroscopy, ureteral lesion biopsy and fulguration, and ureteral stent insertion 07-Feb-2025 15:50:25  Chencho David

## 2025-02-07 NOTE — ASU DISCHARGE PLAN (ADULT/PEDIATRIC) - PROCEDURE
Cystoscopy, with retrograde pyelogram and bladder biopsy; Cystourethroscopy, with ureteroscopy, ureteral lesion biopsy and fulguration, and ureteral stent insertion

## 2025-02-07 NOTE — ASU DISCHARGE PLAN (ADULT/PEDIATRIC) - FINANCIAL ASSISTANCE
[facility] provides services at a reduced cost to those who are determined to be eligible through [facility]’s financial assistance program. Information regarding [facility]’s financial assistance program can be found by going to [email or web address] or by calling [phone].
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
## 2025-02-07 NOTE — ASU PREOP CHECKLIST - LATEX ALLERGY
no 84 y/o F with afib on coumadin, htn, hld, anxiety admitted with left hip pain and hx of having LE edema with SOB. pt's echo showed diastolic dysfunction, pt not symptomatic during admission. no left hip fracture/pathology on xray

## 2025-02-07 NOTE — ASU DISCHARGE PLAN (ADULT/PEDIATRIC) - ASU DC SPECIAL INSTRUCTIONSFT
Please take tylenol and advil as needed for pain. Please contact the office to coordinate care and follow up with Dr. Ortiz and Dr. David outpatient.

## 2025-02-10 PROBLEM — Z87.39 PERSONAL HISTORY OF OTHER DISEASES OF THE MUSCULOSKELETAL SYSTEM AND CONNECTIVE TISSUE: Chronic | Status: ACTIVE | Noted: 2025-01-30

## 2025-02-10 PROBLEM — Z86.79 PERSONAL HISTORY OF OTHER DISEASES OF THE CIRCULATORY SYSTEM: Chronic | Status: ACTIVE | Noted: 2025-01-30

## 2025-02-19 ENCOUNTER — APPOINTMENT (OUTPATIENT)
Dept: UROLOGY | Facility: CLINIC | Age: 80
End: 2025-02-19

## 2025-02-20 LAB — SURGICAL PATHOLOGY STUDY: SIGNIFICANT CHANGE UP

## 2025-02-27 ENCOUNTER — APPOINTMENT (OUTPATIENT)
Dept: UROLOGY | Facility: CLINIC | Age: 80
End: 2025-02-27
Payer: MEDICARE

## 2025-02-27 PROCEDURE — 99214 OFFICE O/P EST MOD 30 MIN: CPT

## 2025-03-03 ENCOUNTER — APPOINTMENT (OUTPATIENT)
Dept: UROLOGY | Facility: CLINIC | Age: 80
End: 2025-03-03
Payer: MEDICARE

## 2025-03-03 VITALS
RESPIRATION RATE: 16 BRPM | DIASTOLIC BLOOD PRESSURE: 83 MMHG | HEIGHT: 62 IN | WEIGHT: 136 LBS | BODY MASS INDEX: 25.03 KG/M2 | SYSTOLIC BLOOD PRESSURE: 184 MMHG | HEART RATE: 63 BPM | OXYGEN SATURATION: 100 %

## 2025-03-03 DIAGNOSIS — C67.9 MALIGNANT NEOPLASM OF BLADDER, UNSPECIFIED: ICD-10-CM

## 2025-03-03 PROCEDURE — 99214 OFFICE O/P EST MOD 30 MIN: CPT

## 2025-03-13 ENCOUNTER — APPOINTMENT (OUTPATIENT)
Dept: UROLOGY | Facility: CLINIC | Age: 80
End: 2025-03-13

## 2025-03-17 ENCOUNTER — APPOINTMENT (OUTPATIENT)
Dept: UROLOGY | Facility: CLINIC | Age: 80
End: 2025-03-17
Payer: MEDICARE

## 2025-03-17 VITALS
HEART RATE: 67 BPM | HEIGHT: 62 IN | OXYGEN SATURATION: 97 % | RESPIRATION RATE: 16 BRPM | SYSTOLIC BLOOD PRESSURE: 150 MMHG | DIASTOLIC BLOOD PRESSURE: 66 MMHG | WEIGHT: 136 LBS | BODY MASS INDEX: 25.03 KG/M2

## 2025-03-17 LAB
BILIRUB UR QL STRIP: ABNORMAL
GLUCOSE UR-MCNC: NEGATIVE
HCG UR QL: 1 EU/DL
HGB UR QL STRIP.AUTO: ABNORMAL
KETONES UR-MCNC: ABNORMAL
LEUKOCYTE ESTERASE UR QL STRIP: ABNORMAL
NITRITE UR QL STRIP: NEGATIVE
PH UR STRIP: 5.5
PROT UR STRIP-MCNC: >=300
SP GR UR STRIP: 1.02

## 2025-03-17 PROCEDURE — 81003 URINALYSIS AUTO W/O SCOPE: CPT | Mod: QW

## 2025-03-17 PROCEDURE — 52310 CYSTOSCOPY AND TREATMENT: CPT

## 2025-03-18 ENCOUNTER — OUTPATIENT (OUTPATIENT)
Dept: OUTPATIENT SERVICES | Facility: HOSPITAL | Age: 80
LOS: 1 days | End: 2025-03-18
Payer: MEDICARE

## 2025-03-18 VITALS
HEIGHT: 62 IN | DIASTOLIC BLOOD PRESSURE: 70 MMHG | WEIGHT: 131.18 LBS | HEART RATE: 68 BPM | RESPIRATION RATE: 18 BRPM | OXYGEN SATURATION: 100 % | SYSTOLIC BLOOD PRESSURE: 127 MMHG | TEMPERATURE: 98 F

## 2025-03-18 DIAGNOSIS — Z01.818 ENCOUNTER FOR OTHER PREPROCEDURAL EXAMINATION: ICD-10-CM

## 2025-03-18 DIAGNOSIS — Z98.890 OTHER SPECIFIED POSTPROCEDURAL STATES: Chronic | ICD-10-CM

## 2025-03-18 DIAGNOSIS — Z96.82 PRESENCE OF NEUROSTIMULATOR: Chronic | ICD-10-CM

## 2025-03-18 DIAGNOSIS — G56.00 CARPAL TUNNEL SYNDROME, UNSPECIFIED UPPER LIMB: Chronic | ICD-10-CM

## 2025-03-18 DIAGNOSIS — C67.9 MALIGNANT NEOPLASM OF BLADDER, UNSPECIFIED: Chronic | ICD-10-CM

## 2025-03-18 LAB
ANION GAP SERPL CALC-SCNC: 4 MMOL/L — LOW (ref 5–17)
APPEARANCE UR: ABNORMAL
APTT BLD: 27.3 SEC — SIGNIFICANT CHANGE UP (ref 24.5–35.6)
BACTERIA # UR AUTO: NEGATIVE /HPF — SIGNIFICANT CHANGE UP
BASOPHILS # BLD AUTO: 0.01 K/UL — SIGNIFICANT CHANGE UP (ref 0–0.2)
BASOPHILS NFR BLD AUTO: 0.2 % — SIGNIFICANT CHANGE UP (ref 0–2)
BILIRUB UR-MCNC: NEGATIVE — SIGNIFICANT CHANGE UP
BLD GP AB SCN SERPL QL: SIGNIFICANT CHANGE UP
BUN SERPL-MCNC: 31 MG/DL — HIGH (ref 7–23)
CALCIUM SERPL-MCNC: 9.6 MG/DL — SIGNIFICANT CHANGE UP (ref 8.5–10.1)
CAST: 30 /LPF — HIGH (ref 0–4)
CHLORIDE SERPL-SCNC: 106 MMOL/L — SIGNIFICANT CHANGE UP (ref 96–108)
CO2 SERPL-SCNC: 28 MMOL/L — SIGNIFICANT CHANGE UP (ref 22–31)
COLOR SPEC: SIGNIFICANT CHANGE UP
CREAT SERPL-MCNC: 1.84 MG/DL — HIGH (ref 0.5–1.3)
DIFF PNL FLD: ABNORMAL
EGFR: 28 ML/MIN/1.73M2 — LOW
EGFR: 28 ML/MIN/1.73M2 — LOW
EOSINOPHIL # BLD AUTO: 0.18 K/UL — SIGNIFICANT CHANGE UP (ref 0–0.5)
EOSINOPHIL NFR BLD AUTO: 3.5 % — SIGNIFICANT CHANGE UP (ref 0–6)
GLUCOSE SERPL-MCNC: 94 MG/DL — SIGNIFICANT CHANGE UP (ref 70–99)
HCT VFR BLD CALC: 36.3 % — SIGNIFICANT CHANGE UP (ref 34.5–45)
HGB BLD-MCNC: 12.3 G/DL — SIGNIFICANT CHANGE UP (ref 11.5–15.5)
HYALINE CASTS # UR AUTO: PRESENT
IMM GRANULOCYTES # BLD AUTO: 0.01 K/UL — SIGNIFICANT CHANGE UP (ref 0–0.07)
IMM GRANULOCYTES NFR BLD AUTO: 0.2 % — SIGNIFICANT CHANGE UP (ref 0–0.9)
INR BLD: 0.98 RATIO — SIGNIFICANT CHANGE UP (ref 0.85–1.16)
KETONES UR-MCNC: ABNORMAL MG/DL
LEUKOCYTE ESTERASE UR-ACNC: ABNORMAL
LYMPHOCYTES # BLD AUTO: 0.84 K/UL — LOW (ref 1–3.3)
LYMPHOCYTES NFR BLD AUTO: 16.5 % — SIGNIFICANT CHANGE UP (ref 13–44)
MCHC RBC-ENTMCNC: 30.8 PG — SIGNIFICANT CHANGE UP (ref 27–34)
MCV RBC AUTO: 90.8 FL — SIGNIFICANT CHANGE UP (ref 80–100)
MONOCYTES # BLD AUTO: 0.52 K/UL — SIGNIFICANT CHANGE UP (ref 0–0.9)
MONOCYTES NFR BLD AUTO: 10.2 % — SIGNIFICANT CHANGE UP (ref 2–14)
NEUTROPHILS # BLD AUTO: 3.53 K/UL — SIGNIFICANT CHANGE UP (ref 1.8–7.4)
NEUTROPHILS NFR BLD AUTO: 69.4 % — SIGNIFICANT CHANGE UP (ref 43–77)
NITRITE UR-MCNC: NEGATIVE — SIGNIFICANT CHANGE UP
NRBC # BLD AUTO: 0 K/UL — SIGNIFICANT CHANGE UP (ref 0–0)
NRBC # FLD: 0 K/UL — SIGNIFICANT CHANGE UP (ref 0–0)
NRBC BLD AUTO-RTO: 0 /100 WBCS — SIGNIFICANT CHANGE UP (ref 0–0)
PH UR: 5.5 — SIGNIFICANT CHANGE UP (ref 5–8)
PLATELET # BLD AUTO: 196 K/UL — SIGNIFICANT CHANGE UP (ref 150–400)
PMV BLD: 10.1 FL — SIGNIFICANT CHANGE UP (ref 7–13)
POTASSIUM SERPL-MCNC: 4.6 MMOL/L — SIGNIFICANT CHANGE UP (ref 3.5–5.3)
POTASSIUM SERPL-SCNC: 4.6 MMOL/L — SIGNIFICANT CHANGE UP (ref 3.5–5.3)
PROT UR-MCNC: 100 MG/DL
PROTHROM AB SERPL-ACNC: 11.6 SEC — SIGNIFICANT CHANGE UP (ref 9.9–13.4)
RBC # BLD: 4 M/UL — SIGNIFICANT CHANGE UP (ref 3.8–5.2)
RBC # FLD: 12.8 % — SIGNIFICANT CHANGE UP (ref 10.3–14.5)
RBC CASTS # UR COMP ASSIST: 11 /HPF — HIGH (ref 0–4)
SODIUM SERPL-SCNC: 138 MMOL/L — SIGNIFICANT CHANGE UP (ref 135–145)
SP GR SPEC: 1.02 — SIGNIFICANT CHANGE UP (ref 1–1.03)
SQUAMOUS # UR AUTO: 11 /HPF — HIGH (ref 0–5)
UROBILINOGEN FLD QL: 1 MG/DL — SIGNIFICANT CHANGE UP (ref 0.2–1)
WBC # BLD: 5.09 K/UL — SIGNIFICANT CHANGE UP (ref 3.8–10.5)
WBC # FLD AUTO: 5.09 K/UL — SIGNIFICANT CHANGE UP (ref 3.8–10.5)
WBC UR QL: 38 /HPF — HIGH (ref 0–5)

## 2025-03-18 PROCEDURE — 86901 BLOOD TYPING SEROLOGIC RH(D): CPT

## 2025-03-18 PROCEDURE — 36415 COLL VENOUS BLD VENIPUNCTURE: CPT

## 2025-03-18 PROCEDURE — 81001 URINALYSIS AUTO W/SCOPE: CPT

## 2025-03-18 PROCEDURE — 86850 RBC ANTIBODY SCREEN: CPT

## 2025-03-18 PROCEDURE — 99214 OFFICE O/P EST MOD 30 MIN: CPT | Mod: 25

## 2025-03-18 PROCEDURE — 85610 PROTHROMBIN TIME: CPT

## 2025-03-18 PROCEDURE — 86900 BLOOD TYPING SEROLOGIC ABO: CPT

## 2025-03-18 PROCEDURE — 87086 URINE CULTURE/COLONY COUNT: CPT

## 2025-03-18 PROCEDURE — 85730 THROMBOPLASTIN TIME PARTIAL: CPT

## 2025-03-18 PROCEDURE — 80048 BASIC METABOLIC PNL TOTAL CA: CPT

## 2025-03-18 PROCEDURE — 85025 COMPLETE CBC W/AUTO DIFF WBC: CPT

## 2025-03-18 NOTE — H&P PST ADULT - NSICDXPASTSURGICALHX_GEN_ALL_CORE_FT
PAST SURGICAL HISTORY:  Carpal tunnel syndrome     H/O cataract extraction     H/O colonoscopy     History of bunionectomy     History of cryosurgery left finger    History of right-sided carotid endarterectomy     Post PTCA     Recurrent malignant neoplasm of bladder     S/P appendectomy     S/P placement of nerve stimulator      PAST SURGICAL HISTORY:  Carpal tunnel syndrome     H/O cataract extraction     H/O colonoscopy     History of biopsy of bladder     History of bunionectomy     History of cryosurgery left finger    History of right-sided carotid endarterectomy     Post PTCA     S/P appendectomy     S/P placement of nerve stimulator

## 2025-03-18 NOTE — H&P PST ADULT - ASSESSMENT
78 y/o female with lesion of left ureter, PMH of bladder cancer, CAD with stents, carotid endarterectomy, PMR, HTN, HLD, S/P nerve stimulator implant. She is scheduled for Robotic left distal ureterectomy and reimplant.   Plan  1. Stop all NSAIDS, herbal supplements and vitamins for 7 days.  2. NPO as per ASU instructions.  3. Take the following medications ( none ) with small sips of water on the morning of your procedure/surgery.  4. Use EZ sponges as directed  5. Labs done in PST. EKG, CXR on chart  6. PMD and cardiologist visits for optimization prior to surgery as per surgeon  7. Hold Losartan the evening before surgery.    CAPRINI SCORE Version 2013    AGE RELATED RISK FACTORS                                                             [ ] Age 41-60 years             [1 point]  [ ] Age: 61-74 years            [2 points]                 [x ] Age 75 years or over     [3 points]             DISEASE RELATED RISK FACTORS                                                       [ ] Current swollen legs (pitting edema of any level)     [1 point]                     [ ] Varicose veins (visible, bulging vein, not spider veins or surgically removed veins)   [1 point]                                 [ ] BMI > 25 Kg/m2                       [1 point]  [ ] BMI > 40 kg/m2                       [1 point]                                 [ ] Serious infection (within past month, requiring hospitalization and IV antibiotics)    [1 point]                     [ ] Lung disease ( interstitial: COPD, emphysema, sarcoid, 9-11 illness, NOT asthma)       [1 point]                                                                          [ ] Acute myocardial infarction (within past month)      [1 point]                  [ ] Congestive heart failure (episode within past month or taking CHF meds)                   [1 point]         [ ] Inflammatory bowel disease (Crohns disease or ulcerative colitis, not irritable bowel syndrome)   [1 point]                  [ ] Central venous access, PICC line or Port (within past month)     [2 points]                                                             [ ] Stroke (within past month)     [5 points]    [x ] Previous or present malignancy (includes melanoma but not basal cell carcinoma, each incidence of cancer scores 2 points-not metastases)  [2 points]                                                                                                                                                         HEMATOLOGY RELATED FACTORS                                                         [ ] History of DVT or PE (including superficial venous thrombosis)    [3 points]                    [ ] Positive family history for DVT or PE (includes first-, second-, and third-degree relatives)   [3 points]   [ ] Personal or family history of genetic thrombophilia (family history counts only if it has not been confirmed that patient does not have this genetic marker)                       [ ] Prothrombin 63103Q mutation                   [3 points]                           [ ] Factor V Leiden                                               [3 points]            [ ] Antithrombin III deficiency                           [3 points]         [ ] Protein C & S deficiency                                [3 points]              [ ] Dysfibrinogenemia                                         [3 points]  [ ] Personal history of acquired thrombophilia                       [ ] Lupus anticoagulant                                       [3 points]                                                                  [ ] Anticardiolipin antibodies                             [3 points]              [ ] Antiphospholipid antibodies                         [3 points]                                                         [ ] High homocysteine in the blood                  [3 points]                                                    [ ] Myeloproliferative disorders (including thrombocytosis)    [3 points]            [ ] HIV                                                                     [3 points]                                                    [ ] Heparin induced thrombocytopenia            [3 points]                                        MOBILITY RELATED FACTORS  [ ] Bed rest or restricted mobility (inability to ambulate 30 feet continuously or removable leg brace) for less than 72 hours    [1 point]  [ ] Nonremovable plaster cast or mold that prevents calf muscle use   [2 points]  [ ] Bed bound  or restricted mobility for more than 72 hours                  [2 points]    GENDER SPECIFIC FACTORS  [ ] Pregnancy or had a baby within the last month   [1 point]  [ ] Hormone therapy  or oral contraception               [1 point]  [ ] Current use of estrogen-like drugs (raloxifine, tamoxifen, anastrozole, letrozole)                                [1 point]  [ ] History of unexplained stillborn infant, premature birth with toxemia or growth-restricted infant   [1 point]  [ ] Recurrent spontaneous abortions (3 or more)      [1 point]    OTHER RISK FACTORS                                         [ ] Current smoker (includes vaping and smoking marijuana)      [1 point]  [ ] Diabetes requiring insulin     [1 point]                   [ ] Chemotherapy (includes methotrexate for rheumatoid arthritis, hydroxyurea for thrombocytosis)    [1 point]  [ ] Blood transfusion(s)               [1 point]    SURGERY RELATED RISK FACTORS  [ ]  section within the last month                    [1 point]  [ ] Minor surgery is planned (less than 45 minutes)     [1 point]  [ ] Past major surgery (longer than 45 minutes) within past month  [2 points]  [ x] Planned major surgery lasting more than 45 minutes (includes laparoscopic and arthroscopic; do not add to the "5" for hip and knee replacement)    [2 points]  [ x] Length of surgery over 2 hours (includes anesthesia time; do not add to the "5" for hip and knee replacement)   [1 point]  [ ] Elective hip or knee joint replacement surgery         [5 points]                                               TRAUMA RELATED RISK FACTORS  [ ] Fracture of the hip, pelvis, or leg                       [5 points]  [ ] Spinal cord injury resulting in paralysis (within the past month)    [5 points]  [ ] Paralysis  (within the past month)                      [5 points]  [ ] Multiple trauma (within the past month)         [5 Points]    Total Score [    8    ]    Total hip and total knee replacement:  Caprini score 9 or less: LOW risk  Caprini score 10 or highter: HIGH RISK    Caprini score 0-2: Low Risk, NO VTE prophylaxis required for most patients, encourage ambulation  Caprini score 3-6: Moderate Risk , pharmacologic VTE prophylaxis is indicated for most patients (in the absence of contraindications)  Caprini score 7 or higher: High risk, pharmocologic VTE prophylaxis indicated for most patients (in the absence of contraindications)

## 2025-03-18 NOTE — H&P PST ADULT - HISTORY OF PRESENT ILLNESS
80 y/o female with lesion of left ureter, PMH of  78 y/o female with lesion of left ureter, PMH of bladder cancer, CAD with stents, carotid endarterectomy, PMR, HTN, HLD, S/P nerve stimulator implant. Pt denies blood in urine or dysuria, she has frequent urination. She is scheduled for Robotic left distal ureterectomy and reimplant.

## 2025-03-18 NOTE — H&P PST ADULT - HEIGHT IN INCHES
Please contact [facility] RN with questions or concerns following today's appointment: [phone].    Thank you!    
2

## 2025-03-18 NOTE — H&P PST ADULT - NSICDXPASTMEDICALHX_GEN_ALL_CORE_FT
PAST MEDICAL HISTORY:  Bladder cancer     Bladder tumor     Bone tumor (benign) left hand    CAD (coronary artery disease)     H/O carotid stenosis     History of polymyalgia rheumatica     HLD (hyperlipidemia)     HTN (hypertension)     OA (osteoarthritis)     S/P placement of nerve stimulator     Stented coronary artery

## 2025-03-19 ENCOUNTER — OUTPATIENT (OUTPATIENT)
Dept: OUTPATIENT SERVICES | Facility: HOSPITAL | Age: 80
LOS: 1 days | End: 2025-03-19
Payer: MEDICARE

## 2025-03-19 ENCOUNTER — RESULT REVIEW (OUTPATIENT)
Age: 80
End: 2025-03-19

## 2025-03-19 DIAGNOSIS — N28.9 DISORDER OF KIDNEY AND URETER, UNSPECIFIED: ICD-10-CM

## 2025-03-19 DIAGNOSIS — Z90.49 ACQUIRED ABSENCE OF OTHER SPECIFIED PARTS OF DIGESTIVE TRACT: Chronic | ICD-10-CM

## 2025-03-19 DIAGNOSIS — Z98.890 OTHER SPECIFIED POSTPROCEDURAL STATES: Chronic | ICD-10-CM

## 2025-03-19 DIAGNOSIS — C67.9 MALIGNANT NEOPLASM OF BLADDER, UNSPECIFIED: ICD-10-CM

## 2025-03-19 DIAGNOSIS — Z01.818 ENCOUNTER FOR OTHER PREPROCEDURAL EXAMINATION: ICD-10-CM

## 2025-03-19 DIAGNOSIS — Z98.61 CORONARY ANGIOPLASTY STATUS: Chronic | ICD-10-CM

## 2025-03-19 DIAGNOSIS — G56.00 CARPAL TUNNEL SYNDROME, UNSPECIFIED UPPER LIMB: Chronic | ICD-10-CM

## 2025-03-19 DIAGNOSIS — Z96.82 PRESENCE OF NEUROSTIMULATOR: Chronic | ICD-10-CM

## 2025-03-19 PROBLEM — Z95.5 PRESENCE OF CORONARY ANGIOPLASTY IMPLANT AND GRAFT: Chronic | Status: ACTIVE | Noted: 2025-03-18

## 2025-03-19 PROBLEM — I25.10 ATHEROSCLEROTIC HEART DISEASE OF NATIVE CORONARY ARTERY WITHOUT ANGINA PECTORIS: Chronic | Status: ACTIVE | Noted: 2025-03-18

## 2025-03-19 LAB
CULTURE RESULTS: SIGNIFICANT CHANGE UP
SPECIMEN SOURCE: SIGNIFICANT CHANGE UP

## 2025-03-19 PROCEDURE — 88321 CONSLTJ&REPRT SLD PREP ELSWR: CPT

## 2025-03-20 DIAGNOSIS — C67.9 MALIGNANT NEOPLASM OF BLADDER, UNSPECIFIED: ICD-10-CM

## 2025-03-20 DIAGNOSIS — N28.9 DISORDER OF KIDNEY AND URETER, UNSPECIFIED: ICD-10-CM

## 2025-03-24 LAB — SURGICAL PATHOLOGY STUDY: SIGNIFICANT CHANGE UP

## 2025-03-25 ENCOUNTER — INPATIENT (INPATIENT)
Facility: HOSPITAL | Age: 80
LOS: 1 days | Discharge: HOME | DRG: 688 | End: 2025-03-27
Attending: UROLOGY | Admitting: UROLOGY
Payer: MEDICARE

## 2025-03-25 ENCOUNTER — APPOINTMENT (OUTPATIENT)
Dept: UROLOGY | Facility: HOSPITAL | Age: 80
End: 2025-03-25

## 2025-03-25 ENCOUNTER — RESULT REVIEW (OUTPATIENT)
Age: 80
End: 2025-03-25

## 2025-03-25 VITALS
HEART RATE: 64 BPM | SYSTOLIC BLOOD PRESSURE: 187 MMHG | WEIGHT: 136.03 LBS | RESPIRATION RATE: 16 BRPM | OXYGEN SATURATION: 100 % | DIASTOLIC BLOOD PRESSURE: 84 MMHG | HEIGHT: 62 IN | TEMPERATURE: 98 F

## 2025-03-25 DIAGNOSIS — C67.9 MALIGNANT NEOPLASM OF BLADDER, UNSPECIFIED: ICD-10-CM

## 2025-03-25 DIAGNOSIS — Z98.61 CORONARY ANGIOPLASTY STATUS: Chronic | ICD-10-CM

## 2025-03-25 DIAGNOSIS — Z90.49 ACQUIRED ABSENCE OF OTHER SPECIFIED PARTS OF DIGESTIVE TRACT: Chronic | ICD-10-CM

## 2025-03-25 DIAGNOSIS — Z98.890 OTHER SPECIFIED POSTPROCEDURAL STATES: Chronic | ICD-10-CM

## 2025-03-25 DIAGNOSIS — Z96.82 PRESENCE OF NEUROSTIMULATOR: Chronic | ICD-10-CM

## 2025-03-25 DIAGNOSIS — G56.00 CARPAL TUNNEL SYNDROME, UNSPECIFIED UPPER LIMB: Chronic | ICD-10-CM

## 2025-03-25 DIAGNOSIS — N28.9 DISORDER OF KIDNEY AND URETER, UNSPECIFIED: ICD-10-CM

## 2025-03-25 DIAGNOSIS — C66.2 MALIGNANT NEOPLASM OF LEFT URETER: ICD-10-CM

## 2025-03-25 LAB
ANION GAP SERPL CALC-SCNC: 4 MMOL/L — LOW (ref 5–17)
BASOPHILS # BLD AUTO: 0.02 K/UL — SIGNIFICANT CHANGE UP (ref 0–0.2)
BASOPHILS NFR BLD AUTO: 0.2 % — SIGNIFICANT CHANGE UP (ref 0–2)
BUN SERPL-MCNC: 30 MG/DL — HIGH (ref 7–23)
CALCIUM SERPL-MCNC: 9 MG/DL — SIGNIFICANT CHANGE UP (ref 8.5–10.1)
CHLORIDE SERPL-SCNC: 105 MMOL/L — SIGNIFICANT CHANGE UP (ref 96–108)
CO2 SERPL-SCNC: 27 MMOL/L — SIGNIFICANT CHANGE UP (ref 22–31)
CREAT SERPL-MCNC: 1.63 MG/DL — HIGH (ref 0.5–1.3)
EGFR: 32 ML/MIN/1.73M2 — LOW
EGFR: 32 ML/MIN/1.73M2 — LOW
EOSINOPHIL # BLD AUTO: 0.01 K/UL — SIGNIFICANT CHANGE UP (ref 0–0.5)
EOSINOPHIL NFR BLD AUTO: 0.1 % — SIGNIFICANT CHANGE UP (ref 0–6)
GLUCOSE SERPL-MCNC: 166 MG/DL — HIGH (ref 70–99)
HCT VFR BLD CALC: 33.5 % — LOW (ref 34.5–45)
HGB BLD-MCNC: 11.5 G/DL — SIGNIFICANT CHANGE UP (ref 11.5–15.5)
IMM GRANULOCYTES # BLD AUTO: 0.04 K/UL — SIGNIFICANT CHANGE UP (ref 0–0.07)
IMM GRANULOCYTES NFR BLD AUTO: 0.4 % — SIGNIFICANT CHANGE UP (ref 0–0.9)
LYMPHOCYTES # BLD AUTO: 0.53 K/UL — LOW (ref 1–3.3)
LYMPHOCYTES NFR BLD AUTO: 4.7 % — LOW (ref 13–44)
MCHC RBC-ENTMCNC: 30.9 PG — SIGNIFICANT CHANGE UP (ref 27–34)
MCHC RBC-ENTMCNC: 34.3 G/DL — SIGNIFICANT CHANGE UP (ref 32–36)
MCV RBC AUTO: 90.1 FL — SIGNIFICANT CHANGE UP (ref 80–100)
MONOCYTES # BLD AUTO: 0.16 K/UL — SIGNIFICANT CHANGE UP (ref 0–0.9)
MONOCYTES NFR BLD AUTO: 1.4 % — LOW (ref 2–14)
NEUTROPHILS # BLD AUTO: 10.63 K/UL — HIGH (ref 1.8–7.4)
NEUTROPHILS NFR BLD AUTO: 93.2 % — HIGH (ref 43–77)
NRBC # BLD AUTO: 0 K/UL — SIGNIFICANT CHANGE UP (ref 0–0)
NRBC # FLD: 0 K/UL — SIGNIFICANT CHANGE UP (ref 0–0)
NRBC BLD AUTO-RTO: 0 /100 WBCS — SIGNIFICANT CHANGE UP (ref 0–0)
PLATELET # BLD AUTO: 216 K/UL — SIGNIFICANT CHANGE UP (ref 150–400)
PMV BLD: 10.1 FL — SIGNIFICANT CHANGE UP (ref 7–13)
POTASSIUM SERPL-MCNC: 4.6 MMOL/L — SIGNIFICANT CHANGE UP (ref 3.5–5.3)
POTASSIUM SERPL-SCNC: 4.6 MMOL/L — SIGNIFICANT CHANGE UP (ref 3.5–5.3)
RBC # BLD: 3.72 M/UL — LOW (ref 3.8–5.2)
RBC # FLD: 12.3 % — SIGNIFICANT CHANGE UP (ref 10.3–14.5)
SODIUM SERPL-SCNC: 136 MMOL/L — SIGNIFICANT CHANGE UP (ref 135–145)
WBC # BLD: 11.39 K/UL — HIGH (ref 3.8–10.5)
WBC # FLD AUTO: 11.39 K/UL — HIGH (ref 3.8–10.5)

## 2025-03-25 PROCEDURE — C1769: CPT

## 2025-03-25 PROCEDURE — 50715 RELEASE OF URETER: CPT | Mod: LT,22,59

## 2025-03-25 PROCEDURE — 88305 TISSUE EXAM BY PATHOLOGIST: CPT | Mod: 26

## 2025-03-25 PROCEDURE — C9399: CPT

## 2025-03-25 PROCEDURE — 76998 US GUIDE INTRAOP: CPT | Mod: 26,AS,LT

## 2025-03-25 PROCEDURE — 88331 PATH CONSLTJ SURG 1 BLK 1SPC: CPT

## 2025-03-25 PROCEDURE — 74019 RADEX ABDOMEN 2 VIEWS: CPT

## 2025-03-25 PROCEDURE — C1889: CPT

## 2025-03-25 PROCEDURE — 36415 COLL VENOUS BLD VENIPUNCTURE: CPT

## 2025-03-25 PROCEDURE — 50785 REIMPLANT URETER IN BLADDER: CPT | Mod: LT,22

## 2025-03-25 PROCEDURE — 50785 REIMPLANT URETER IN BLADDER: CPT | Mod: AS,22,LT

## 2025-03-25 PROCEDURE — 88305 TISSUE EXAM BY PATHOLOGIST: CPT

## 2025-03-25 PROCEDURE — C2617: CPT

## 2025-03-25 PROCEDURE — S2900: CPT

## 2025-03-25 PROCEDURE — 85025 COMPLETE CBC W/AUTO DIFF WBC: CPT

## 2025-03-25 PROCEDURE — 50715 RELEASE OF URETER: CPT | Mod: AS,LT,22,59

## 2025-03-25 PROCEDURE — 85027 COMPLETE CBC AUTOMATED: CPT

## 2025-03-25 PROCEDURE — 76998 US GUIDE INTRAOP: CPT | Mod: 26,LT

## 2025-03-25 PROCEDURE — 80048 BASIC METABOLIC PNL TOTAL CA: CPT

## 2025-03-25 PROCEDURE — 88307 TISSUE EXAM BY PATHOLOGIST: CPT

## 2025-03-25 PROCEDURE — 88331 PATH CONSLTJ SURG 1 BLK 1SPC: CPT | Mod: 26

## 2025-03-25 PROCEDURE — 88307 TISSUE EXAM BY PATHOLOGIST: CPT | Mod: 26

## 2025-03-25 RX ORDER — PREDNISONE 20 MG/1
1 TABLET ORAL
Refills: 0 | DISCHARGE

## 2025-03-25 RX ORDER — FENTANYL CITRATE-0.9 % NACL/PF 100MCG/2ML
50 SYRINGE (ML) INTRAVENOUS
Refills: 0 | Status: DISCONTINUED | OUTPATIENT
Start: 2025-03-25 | End: 2025-03-25

## 2025-03-25 RX ORDER — HEPARIN SODIUM 1000 [USP'U]/ML
5000 INJECTION INTRAVENOUS; SUBCUTANEOUS EVERY 12 HOURS
Refills: 0 | Status: DISCONTINUED | OUTPATIENT
Start: 2025-03-25 | End: 2025-03-27

## 2025-03-25 RX ORDER — HYDROMORPHONE/SOD CHLOR,ISO/PF 2 MG/10 ML
0.5 SYRINGE (ML) INJECTION
Refills: 0 | Status: DISCONTINUED | OUTPATIENT
Start: 2025-03-25 | End: 2025-03-25

## 2025-03-25 RX ORDER — ACETAMINOPHEN 500 MG/5ML
650 LIQUID (ML) ORAL EVERY 6 HOURS
Refills: 0 | Status: DISCONTINUED | OUTPATIENT
Start: 2025-03-25 | End: 2025-03-27

## 2025-03-25 RX ORDER — ONDANSETRON HCL/PF 4 MG/2 ML
4 VIAL (ML) INJECTION ONCE
Refills: 0 | Status: DISCONTINUED | OUTPATIENT
Start: 2025-03-25 | End: 2025-03-25

## 2025-03-25 RX ORDER — ACETAMINOPHEN 500 MG/5ML
1000 LIQUID (ML) ORAL ONCE
Refills: 0 | Status: DISCONTINUED | OUTPATIENT
Start: 2025-03-25 | End: 2025-03-27

## 2025-03-25 RX ORDER — OXYCODONE HYDROCHLORIDE 30 MG/1
10 TABLET ORAL EVERY 4 HOURS
Refills: 0 | Status: DISCONTINUED | OUTPATIENT
Start: 2025-03-25 | End: 2025-03-27

## 2025-03-25 RX ORDER — ONDANSETRON HCL/PF 4 MG/2 ML
4 VIAL (ML) INJECTION EVERY 6 HOURS
Refills: 0 | Status: DISCONTINUED | OUTPATIENT
Start: 2025-03-25 | End: 2025-03-27

## 2025-03-25 RX ORDER — SODIUM CHLORIDE 9 G/1000ML
1000 INJECTION, SOLUTION INTRAVENOUS
Refills: 0 | Status: DISCONTINUED | OUTPATIENT
Start: 2025-03-25 | End: 2025-03-25

## 2025-03-25 RX ORDER — OXYCODONE HYDROCHLORIDE 30 MG/1
5 TABLET ORAL EVERY 6 HOURS
Refills: 0 | Status: DISCONTINUED | OUTPATIENT
Start: 2025-03-25 | End: 2025-03-27

## 2025-03-25 RX ORDER — LOSARTAN POTASSIUM 100 MG/1
50 TABLET, FILM COATED ORAL DAILY
Refills: 0 | Status: DISCONTINUED | OUTPATIENT
Start: 2025-03-25 | End: 2025-03-27

## 2025-03-25 RX ORDER — PREDNISONE 20 MG/1
1 TABLET ORAL AT BEDTIME
Refills: 0 | Status: DISCONTINUED | OUTPATIENT
Start: 2025-03-25 | End: 2025-03-27

## 2025-03-25 RX ORDER — METOPROLOL SUCCINATE 50 MG/1
25 TABLET, EXTENDED RELEASE ORAL AT BEDTIME
Refills: 0 | Status: DISCONTINUED | OUTPATIENT
Start: 2025-03-25 | End: 2025-03-27

## 2025-03-25 RX ORDER — HYDROMORPHONE/SOD CHLOR,ISO/PF 2 MG/10 ML
0.5 SYRINGE (ML) INJECTION EVERY 4 HOURS
Refills: 0 | Status: DISCONTINUED | OUTPATIENT
Start: 2025-03-25 | End: 2025-03-27

## 2025-03-25 RX ORDER — ATORVASTATIN CALCIUM 80 MG/1
40 TABLET, FILM COATED ORAL AT BEDTIME
Refills: 0 | Status: DISCONTINUED | OUTPATIENT
Start: 2025-03-25 | End: 2025-03-27

## 2025-03-25 RX ADMIN — Medication 0.5 MILLIGRAM(S): at 19:38

## 2025-03-25 RX ADMIN — LOSARTAN POTASSIUM 50 MILLIGRAM(S): 100 TABLET, FILM COATED ORAL at 22:02

## 2025-03-25 RX ADMIN — Medication 100 MILLILITER(S): at 19:38

## 2025-03-25 RX ADMIN — Medication 0.5 MILLIGRAM(S): at 21:05

## 2025-03-25 RX ADMIN — ATORVASTATIN CALCIUM 40 MILLIGRAM(S): 80 TABLET, FILM COATED ORAL at 22:02

## 2025-03-25 RX ADMIN — Medication 0.5 MILLIGRAM(S): at 21:15

## 2025-03-25 RX ADMIN — HEPARIN SODIUM 5000 UNIT(S): 1000 INJECTION INTRAVENOUS; SUBCUTANEOUS at 22:21

## 2025-03-25 RX ADMIN — Medication 0.5 MILLIGRAM(S): at 20:00

## 2025-03-25 RX ADMIN — PREDNISONE 1 MILLIGRAM(S): 20 TABLET ORAL at 22:20

## 2025-03-25 RX ADMIN — Medication 4 MILLIGRAM(S): at 22:20

## 2025-03-25 RX ADMIN — Medication 0.5 MILLIGRAM(S): at 19:08

## 2025-03-25 RX ADMIN — Medication 0.5 MILLIGRAM(S): at 19:32

## 2025-03-25 NOTE — BRIEF OPERATIVE NOTE - NSICDXBRIEFPROCEDURE_GEN_ALL_CORE_FT
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

## 2025-03-25 NOTE — ASU PREOP CHECKLIST - HEIGHT IN CM
TRANSITIONAL CARE MANAGEMENT - HOSPITAL DISCHARGE FOLLOW-UP    Contacted Mr. Vazquez regarding follow-up for COPD after hospital discharge. He was discharged from the hospital on 12/31/19. Review of the After Visit Summary from the recent hospitalization indicates that the patient needs to follow up.    He feels that he is doing well at home.   His diet concern is none. Overall, the patient is eating well.   Ambulation: improved  Fever: is not present  Pain: none  Activities of Daily Living (global): Partial assistance   Patient states that he does have sufficient family support. He feels that he is not able to ask for assistance when needed.    Additional patient/family concerns: None .    Discharge medications were verified with the patient. He is fully compliant with the medication regimen prescribed at the time of discharge. He reports that he is not experiencing any medication side effects.    Upon discharge, the patient was to receive home healthcare services . These services have not been initiated.    Patient has an appointment on 1/6/2020 with Cady Hayden MD. Mr. Vazquez was reminded about the importance of keeping this appointment.         
157.48

## 2025-03-25 NOTE — PROGRESS NOTE ADULT - ASSESSMENT
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

## 2025-03-25 NOTE — PROGRESS NOTE ADULT - SUBJECTIVE AND OBJECTIVE BOX
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 tolerating clears. denies f/c/n/v. denies dizziness, SOB, CP or palpitations    acetaminophen   IVPB .. 1000 milliGRAM(s) IV Intermittent once  atorvastatin 40 milliGRAM(s) Oral at bedtime  fentaNYL    Injectable 50 MICROGram(s) IV Push every 5 minutes PRN  heparin   Injectable 5000 Unit(s) SubCutaneous every 12 hours  HYDROmorphone  Injectable 0.5 milliGRAM(s) IV Push every 4 hours PRN  HYDROmorphone  Injectable 0.5 milliGRAM(s) IV Push every 10 minutes PRN  lactated ringers. 1000 milliLiter(s) IV Continuous <Continuous>  losartan 50 milliGRAM(s) Oral daily  metoprolol succinate ER 25 milliGRAM(s) Oral at bedtime  ondansetron Injectable 4 milliGRAM(s) IV Push once PRN  ondansetron Injectable 4 milliGRAM(s) IV Push every 6 hours PRN  oxyCODONE    IR 10 milliGRAM(s) Oral every 4 hours PRN  oxyCODONE    IR 5 milliGRAM(s) Oral every 6 hours PRN  predniSONE   Tablet 1 milliGRAM(s) Oral at bedtime  sodium chloride 0.9%. 1000 milliLiter(s) IV Continuous <Continuous>      Vital Signs Last 24 Hrs  T(C): 36.3 (25 Mar 2025 20:00), Max: 36.7 (25 Mar 2025 10:49)  T(F): 97.4 (25 Mar 2025 20:00), Max: 98 (25 Mar 2025 10:49)  HR: 74 (25 Mar 2025 21:15) (64 - 74)  BP: 160/61 (25 Mar 2025 21:00) (152/60 - 193/72)  BP(mean): --  RR: 15 (25 Mar 2025 21:15) (14 - 20)  SpO2: 98% (25 Mar 2025 21:15) (97% - 100%)    Parameters below as of 25 Mar 2025 21:15  Patient On (Oxygen Delivery Method): room air        I&O's Summary    25 Mar 2025 07:01  -  25 Mar 2025 21:28  --------------------------------------------------------  IN: 1843 mL / OUT: 460 mL / NET: 1383 mL        Physical Exam  Gen: NAD, comfortable in bed  Neuro: A&Ox3  Lungs: CTAB  CV: S1/S2, RRR  Abd: Soft, ND, + incisional tenderness, no rebound no guarding.  YING with ~ 20 cc serosanguinous fluid   : light bloody urine ~ 130cc/2 hr  Extremities: Venodynes in place. No LE edema bl                          11.5   11.39 )-----------( 216      ( 25 Mar 2025 19:21 )             33.5       03-25    136  |  105  |  30[H]  ----------------------------<  166[H]  4.6   |  27  |  1.63[H]    Ca    9.0      25 Mar 2025 19:21

## 2025-03-26 LAB
ANION GAP SERPL CALC-SCNC: 4 MMOL/L — LOW (ref 5–17)
BASOPHILS # BLD AUTO: 0.02 K/UL — SIGNIFICANT CHANGE UP (ref 0–0.2)
BASOPHILS NFR BLD AUTO: 0.2 % — SIGNIFICANT CHANGE UP (ref 0–2)
BUN SERPL-MCNC: 30 MG/DL — HIGH (ref 7–23)
CALCIUM SERPL-MCNC: 8.6 MG/DL — SIGNIFICANT CHANGE UP (ref 8.5–10.1)
CHLORIDE SERPL-SCNC: 104 MMOL/L — SIGNIFICANT CHANGE UP (ref 96–108)
CO2 SERPL-SCNC: 24 MMOL/L — SIGNIFICANT CHANGE UP (ref 22–31)
CREAT SERPL-MCNC: 1.48 MG/DL — HIGH (ref 0.5–1.3)
EGFR: 36 ML/MIN/1.73M2 — LOW
EGFR: 36 ML/MIN/1.73M2 — LOW
EOSINOPHIL # BLD AUTO: 0 K/UL — SIGNIFICANT CHANGE UP (ref 0–0.5)
EOSINOPHIL NFR BLD AUTO: 0 % — SIGNIFICANT CHANGE UP (ref 0–6)
GLUCOSE SERPL-MCNC: 107 MG/DL — HIGH (ref 70–99)
HCT VFR BLD CALC: 28.9 % — LOW (ref 34.5–45)
HGB BLD-MCNC: 9.8 G/DL — LOW (ref 11.5–15.5)
IMM GRANULOCYTES # BLD AUTO: 0.03 K/UL — SIGNIFICANT CHANGE UP (ref 0–0.07)
IMM GRANULOCYTES NFR BLD AUTO: 0.3 % — SIGNIFICANT CHANGE UP (ref 0–0.9)
LYMPHOCYTES # BLD AUTO: 0.69 K/UL — LOW (ref 1–3.3)
LYMPHOCYTES NFR BLD AUTO: 6.8 % — LOW (ref 13–44)
MCHC RBC-ENTMCNC: 30.5 PG — SIGNIFICANT CHANGE UP (ref 27–34)
MCHC RBC-ENTMCNC: 33.9 G/DL — SIGNIFICANT CHANGE UP (ref 32–36)
MCV RBC AUTO: 90 FL — SIGNIFICANT CHANGE UP (ref 80–100)
MONOCYTES # BLD AUTO: 0.62 K/UL — SIGNIFICANT CHANGE UP (ref 0–0.9)
MONOCYTES NFR BLD AUTO: 6.1 % — SIGNIFICANT CHANGE UP (ref 2–14)
NEUTROPHILS # BLD AUTO: 8.76 K/UL — HIGH (ref 1.8–7.4)
NEUTROPHILS NFR BLD AUTO: 86.6 % — HIGH (ref 43–77)
NRBC # BLD AUTO: 0 K/UL — SIGNIFICANT CHANGE UP (ref 0–0)
NRBC # FLD: 0 K/UL — SIGNIFICANT CHANGE UP (ref 0–0)
NRBC BLD AUTO-RTO: 0 /100 WBCS — SIGNIFICANT CHANGE UP (ref 0–0)
PLATELET # BLD AUTO: 220 K/UL — SIGNIFICANT CHANGE UP (ref 150–400)
PMV BLD: 10.7 FL — SIGNIFICANT CHANGE UP (ref 7–13)
POTASSIUM SERPL-MCNC: 5 MMOL/L — SIGNIFICANT CHANGE UP (ref 3.5–5.3)
POTASSIUM SERPL-SCNC: 5 MMOL/L — SIGNIFICANT CHANGE UP (ref 3.5–5.3)
RBC # BLD: 3.21 M/UL — LOW (ref 3.8–5.2)
RBC # FLD: 12.4 % — SIGNIFICANT CHANGE UP (ref 10.3–14.5)
SODIUM SERPL-SCNC: 132 MMOL/L — LOW (ref 135–145)
WBC # BLD: 10.12 K/UL — SIGNIFICANT CHANGE UP (ref 3.8–10.5)
WBC # FLD AUTO: 10.12 K/UL — SIGNIFICANT CHANGE UP (ref 3.8–10.5)

## 2025-03-26 PROCEDURE — 74019 RADEX ABDOMEN 2 VIEWS: CPT | Mod: 26

## 2025-03-26 RX ORDER — OXYBUTYNIN CHLORIDE 5 MG/1
5 TABLET, FILM COATED, EXTENDED RELEASE ORAL EVERY 8 HOURS
Refills: 0 | Status: DISCONTINUED | OUTPATIENT
Start: 2025-03-26 | End: 2025-03-27

## 2025-03-26 RX ADMIN — OXYCODONE HYDROCHLORIDE 5 MILLIGRAM(S): 30 TABLET ORAL at 21:31

## 2025-03-26 RX ADMIN — Medication 650 MILLIGRAM(S): at 06:48

## 2025-03-26 RX ADMIN — HEPARIN SODIUM 5000 UNIT(S): 1000 INJECTION INTRAVENOUS; SUBCUTANEOUS at 21:36

## 2025-03-26 RX ADMIN — PREDNISONE 1 MILLIGRAM(S): 20 TABLET ORAL at 21:33

## 2025-03-26 RX ADMIN — OXYCODONE HYDROCHLORIDE 5 MILLIGRAM(S): 30 TABLET ORAL at 03:07

## 2025-03-26 RX ADMIN — Medication 100 MILLILITER(S): at 11:14

## 2025-03-26 RX ADMIN — Medication 650 MILLIGRAM(S): at 00:42

## 2025-03-26 RX ADMIN — Medication 100 MILLILITER(S): at 04:46

## 2025-03-26 RX ADMIN — METOPROLOL SUCCINATE 25 MILLIGRAM(S): 50 TABLET, EXTENDED RELEASE ORAL at 00:42

## 2025-03-26 RX ADMIN — Medication 650 MILLIGRAM(S): at 19:01

## 2025-03-26 RX ADMIN — OXYCODONE HYDROCHLORIDE 5 MILLIGRAM(S): 30 TABLET ORAL at 22:05

## 2025-03-26 RX ADMIN — OXYBUTYNIN CHLORIDE 5 MILLIGRAM(S): 5 TABLET, FILM COATED, EXTENDED RELEASE ORAL at 22:46

## 2025-03-26 RX ADMIN — ATORVASTATIN CALCIUM 40 MILLIGRAM(S): 80 TABLET, FILM COATED ORAL at 21:33

## 2025-03-26 RX ADMIN — HEPARIN SODIUM 5000 UNIT(S): 1000 INJECTION INTRAVENOUS; SUBCUTANEOUS at 11:16

## 2025-03-26 RX ADMIN — OXYCODONE HYDROCHLORIDE 5 MILLIGRAM(S): 30 TABLET ORAL at 02:25

## 2025-03-26 RX ADMIN — LOSARTAN POTASSIUM 50 MILLIGRAM(S): 100 TABLET, FILM COATED ORAL at 21:36

## 2025-03-26 RX ADMIN — OXYBUTYNIN CHLORIDE 5 MILLIGRAM(S): 5 TABLET, FILM COATED, EXTENDED RELEASE ORAL at 14:35

## 2025-03-26 RX ADMIN — OXYCODONE HYDROCHLORIDE 5 MILLIGRAM(S): 30 TABLET ORAL at 04:00

## 2025-03-26 RX ADMIN — Medication 650 MILLIGRAM(S): at 01:30

## 2025-03-26 RX ADMIN — Medication 500 MILLILITER(S): at 04:54

## 2025-03-26 NOTE — PROVIDER CONTACT NOTE (OTHER) - SITUATION
low urine output, blood tinged urine, total output 75cc since transferred to unit
patient had 225 ml of output over 6.5hrs
patient received 5mg of oxycodone at 0220, now at 0300 patient feels no relief in pain.

## 2025-03-26 NOTE — PROGRESS NOTE ADULT - SUBJECTIVE AND OBJECTIVE BOX
POD # 1     Pt resting in bed. Shannan clears. Not much pain. + flatus. No other complaints.       Vital Signs Last 24 Hrs  T(C): 36.9 (26 Mar 2025 07:37), Max: 37.1 (26 Mar 2025 00:22)  T(F): 98.4 (26 Mar 2025 07:37), Max: 98.8 (26 Mar 2025 00:22)  HR: 63 (26 Mar 2025 07:37) (63 - 74)  BP: 156/55 (26 Mar 2025 07:37) (142/55 - 193/72)  BP(mean): --  RR: 16 (26 Mar 2025 07:37) (14 - 20)  SpO2: 95% (26 Mar 2025 07:37) (94% - 100%)    Parameters below as of 26 Mar 2025 07:37  Patient On (Oxygen Delivery Method): room air        I&O's Summary    25 Mar 2025 07:01  -  26 Mar 2025 07:00  --------------------------------------------------------  IN: 1843 mL / OUT: 765 mL / NET: 1078 mL    26 Mar 2025 07:01  -  26 Mar 2025 10:41  --------------------------------------------------------  IN: 0 mL / OUT: 30 mL / NET: -30 mL        Physical Exam  Gen: NAD, A&Ox3  Pulm: CTA bilat, no rales rhonchi or wheezes.   CV: RRR, no JVD  Abd: Soft, NT, ND, +BS           Wound: C/D/I, YING- 75cc sero-sang fluid  : Alford -390cc =20cc in bag - urine light te colored                          9.8    10.12 )-----------( 220      ( 26 Mar 2025 07:52 )             28.9       03-26    132[L]  |  104  |  30[H]  ----------------------------<  107[H]  5.0   |  24  |  1.48[H]    Ca    8.6      26 Mar 2025 07:52

## 2025-03-26 NOTE — PROVIDER CONTACT NOTE (OTHER) - BACKGROUND
s/p robotic assisted surgical removal of neoplasm of left ureterectomy with left uretal reimplantation with dennis flap and psoas hitch
s/p robotic assisted surgical removal of neoplasm of left ureterectomy with left uretal reimplantation with dennis flap and psoas hitch
Robot-assisted surgical removal of neoplasm of Left ureterectomy with left ueretal-reimplantration with a dennis flap and a psoas hitch

## 2025-03-26 NOTE — PROGRESS NOTE ADULT - ASSESSMENT
A/P: 79y Female s/p Robot Left Ureterectomy/Re-implant/Left Stent placement    Adv to Reg diet  Abdominal Flat and Upright X-ray to check stent position  Ambulate  Monitor Urine and YING output  Continue IV fluids  Probable d/c home tomorrow  Ck am labs  Above discussed with Dr. Ortiz

## 2025-03-26 NOTE — PROVIDER CONTACT NOTE (OTHER) - ASSESSMENT
Problem: Breathing Pattern Ineffective  Goal: Air exchange is effective, demonstrated by Sp02 sat of greater then or = 92% (or as ordered)  Outcome: Outcome Met, Continue evaluating goal progress toward completion  Goal: Respiratory pattern is quiet and regular without report of SOB  Outcome: Outcome Met, Continue evaluating goal progress toward completion     Problem: Pneumonia  Goal: S/S of acute pneumonia are resolved  Description: If acute pneumonia is present, monitor for resolution of fever, cough, secretions and other test values based on presentation.  Outcome: Outcome Not Met, Continue to Monitor  Goal: Verbalizes understanding of pneumonia, treatment, and ongoing prevention  Description: Document on Patient Education Activity  Outcome: Outcome Met, Continue evaluating goal progress toward completion     Problem: At Risk for Falls  Goal: # Patient does not fall  Outcome: Outcome Met, Continue evaluating goal progress toward completion  Goal: # Takes action to control fall-related risks  Outcome: Outcome Met, Continue evaluating goal progress toward completion  Goal: # Verbalizes understanding of fall risk/precautions  Description: Document education using the patient education activity  Outcome: Outcome Met, Continue evaluating goal progress toward completion      /68  HR: 67  O2 99%

## 2025-03-26 NOTE — PROVIDER CONTACT NOTE (OTHER) - ACTION/TREATMENT ORDERED:
PA made aware. ok to give another 5mg of oxycodone at this time
No intervention for now, continue to monitor
PA aware. 500cc bolus ordered

## 2025-03-27 ENCOUNTER — TRANSCRIPTION ENCOUNTER (OUTPATIENT)
Age: 80
End: 2025-03-27

## 2025-03-27 VITALS
SYSTOLIC BLOOD PRESSURE: 163 MMHG | HEART RATE: 67 BPM | RESPIRATION RATE: 18 BRPM | TEMPERATURE: 98 F | DIASTOLIC BLOOD PRESSURE: 67 MMHG | OXYGEN SATURATION: 99 %

## 2025-03-27 LAB
ANION GAP SERPL CALC-SCNC: 2 MMOL/L — LOW (ref 5–17)
BUN SERPL-MCNC: 23 MG/DL — SIGNIFICANT CHANGE UP (ref 7–23)
CALCIUM SERPL-MCNC: 8.4 MG/DL — LOW (ref 8.5–10.1)
CHLORIDE SERPL-SCNC: 113 MMOL/L — HIGH (ref 96–108)
CO2 SERPL-SCNC: 26 MMOL/L — SIGNIFICANT CHANGE UP (ref 22–31)
CREAT SERPL-MCNC: 1.28 MG/DL — SIGNIFICANT CHANGE UP (ref 0.5–1.3)
EGFR: 43 ML/MIN/1.73M2 — LOW
EGFR: 43 ML/MIN/1.73M2 — LOW
GLUCOSE SERPL-MCNC: 104 MG/DL — HIGH (ref 70–99)
HCT VFR BLD CALC: 26.8 % — LOW (ref 34.5–45)
HGB BLD-MCNC: 9.1 G/DL — LOW (ref 11.5–15.5)
MCHC RBC-ENTMCNC: 31.6 PG — SIGNIFICANT CHANGE UP (ref 27–34)
MCHC RBC-ENTMCNC: 34 G/DL — SIGNIFICANT CHANGE UP (ref 32–36)
MCV RBC AUTO: 93.1 FL — SIGNIFICANT CHANGE UP (ref 80–100)
NRBC # BLD AUTO: 0 K/UL — SIGNIFICANT CHANGE UP (ref 0–0)
NRBC # FLD: 0 K/UL — SIGNIFICANT CHANGE UP (ref 0–0)
NRBC BLD AUTO-RTO: 0 /100 WBCS — SIGNIFICANT CHANGE UP (ref 0–0)
PLATELET # BLD AUTO: 154 K/UL — SIGNIFICANT CHANGE UP (ref 150–400)
PMV BLD: 9.7 FL — SIGNIFICANT CHANGE UP (ref 7–13)
POTASSIUM SERPL-MCNC: 4.8 MMOL/L — SIGNIFICANT CHANGE UP (ref 3.5–5.3)
POTASSIUM SERPL-SCNC: 4.8 MMOL/L — SIGNIFICANT CHANGE UP (ref 3.5–5.3)
RBC # BLD: 2.88 M/UL — LOW (ref 3.8–5.2)
RBC # FLD: 12.8 % — SIGNIFICANT CHANGE UP (ref 10.3–14.5)
SODIUM SERPL-SCNC: 141 MMOL/L — SIGNIFICANT CHANGE UP (ref 135–145)
WBC # BLD: 6.27 K/UL — SIGNIFICANT CHANGE UP (ref 3.8–10.5)
WBC # FLD AUTO: 6.27 K/UL — SIGNIFICANT CHANGE UP (ref 3.8–10.5)

## 2025-03-27 RX ORDER — ACETAMINOPHEN 500 MG/5ML
2 LIQUID (ML) ORAL
Qty: 0 | Refills: 0 | DISCHARGE
Start: 2025-03-27

## 2025-03-27 RX ORDER — ACETAMINOPHEN 500 MG/5ML
100 LIQUID (ML) ORAL
Qty: 0 | Refills: 0 | DISCHARGE
Start: 2025-03-27

## 2025-03-27 RX ORDER — OXYCODONE HYDROCHLORIDE 30 MG/1
1 TABLET ORAL
Qty: 16 | Refills: 0
Start: 2025-03-27 | End: 2025-03-30

## 2025-03-27 RX ORDER — OXYBUTYNIN CHLORIDE 5 MG/1
1 TABLET, FILM COATED, EXTENDED RELEASE ORAL
Qty: 15 | Refills: 0
Start: 2025-03-27 | End: 2025-03-31

## 2025-03-27 RX ADMIN — OXYCODONE HYDROCHLORIDE 5 MILLIGRAM(S): 30 TABLET ORAL at 06:42

## 2025-03-27 RX ADMIN — OXYCODONE HYDROCHLORIDE 5 MILLIGRAM(S): 30 TABLET ORAL at 07:26

## 2025-03-27 RX ADMIN — METOPROLOL SUCCINATE 25 MILLIGRAM(S): 50 TABLET, EXTENDED RELEASE ORAL at 06:33

## 2025-03-27 RX ADMIN — OXYBUTYNIN CHLORIDE 5 MILLIGRAM(S): 5 TABLET, FILM COATED, EXTENDED RELEASE ORAL at 14:30

## 2025-03-27 RX ADMIN — OXYBUTYNIN CHLORIDE 5 MILLIGRAM(S): 5 TABLET, FILM COATED, EXTENDED RELEASE ORAL at 06:42

## 2025-03-27 RX ADMIN — HEPARIN SODIUM 5000 UNIT(S): 1000 INJECTION INTRAVENOUS; SUBCUTANEOUS at 11:55

## 2025-03-27 RX ADMIN — OXYCODONE HYDROCHLORIDE 5 MILLIGRAM(S): 30 TABLET ORAL at 15:31

## 2025-03-27 NOTE — DISCHARGE NOTE PROVIDER - NSDCMRMEDTOKEN_GEN_ALL_CORE_FT
acetaminophen 10 mg/mL intravenous solution: 100 milliliter(s) intravenous once  acetaminophen 325 mg oral tablet: 2 tab(s) orally every 6 hours As needed Mild Pain (1 - 3)  aspirin 81 mg oral delayed release tablet: 1 tab(s) orally once a day  atorvastatin 40 mg oral tablet: 1 tab(s) orally once a day (at bedtime)  losartan 50 mg oral tablet: 1 tab(s) orally once a day (in the evening)  predniSONE 1 mg oral tablet: 1 tab(s) orally once a day (in the evening)  Toprol-XL 50 mg oral tablet, extended release: 0.5 tab(s) orally once a day (in the evening)   acetaminophen 10 mg/mL intravenous solution: 100 milliliter(s) intravenous once  acetaminophen 325 mg oral tablet: 2 tab(s) orally every 6 hours As needed Mild Pain (1 - 3)  aspirin 81 mg oral delayed release tablet: 1 tab(s) orally once a day Hold for 2 days and then restart on 3/30/25 if ok with your cardiologist  atorvastatin 40 mg oral tablet: 1 tab(s) orally once a day (at bedtime)  losartan 50 mg oral tablet: 1 tab(s) orally once a day (in the evening)  oxyBUTYnin 5 mg oral tablet: 1 tab(s) orally every 8 hours as needed for  bladder spasms stop one day prior to trial of void  oxyCODONE 5 mg oral tablet: 1 tab(s) orally every 6 hours as needed for  severe pain MDD: 4  predniSONE 1 mg oral tablet: 1 tab(s) orally once a day (in the evening)  Toprol-XL 50 mg oral tablet, extended release: 0.5 tab(s) orally once a day (in the evening)

## 2025-03-27 NOTE — DISCHARGE NOTE PROVIDER - NSDCCPCAREPLAN_GEN_ALL_CORE_FT
PRINCIPAL DISCHARGE DIAGNOSIS  Diagnosis: Ureteral cancer, left  Assessment and Plan of Treatment:

## 2025-03-27 NOTE — DISCHARGE NOTE PROVIDER - NSDCFUADDINST_GEN_ALL_CORE_FT
Remove gauze from old YING site tomorrow.  May replace gauze if oozing.  Showering ok  No heavy lifting  No driving if taking pain medication  Follow up with Dr Ortiz

## 2025-03-27 NOTE — DISCHARGE NOTE NURSING/CASE MANAGEMENT/SOCIAL WORK - NSDCPEFALRISK_GEN_ALL_CORE
For information on Fall & Injury Prevention, visit: [email or web address] OR  [email or web address] OR  https://www.cdc.gov/steadi/patient.html

## 2025-03-27 NOTE — DISCHARGE NOTE PROVIDER - NSDCFUSCHEDAPPT_GEN_ALL_CORE_FT
Sanchez Ortiz  [facility] Physician [facility]  UROLOGY [address]  Scheduled Appointment: 04/07/2025    Marcelle Montes De Oca  [facility]  RHEUM [address]  Scheduled Appointment: 05/05/2025

## 2025-03-27 NOTE — DISCHARGE NOTE PROVIDER - HOSPITAL COURSE
· Subjective and Objective:   79y Female with POD 2 s/p Robot-assisted surgical removal of neoplasm of ureter, stent placement, Creation of left Boari flap. Pt is pain controlled, +flatus, tolerating reg diet. Denies CP, palpitation, SOB, N/V/abdominal pain    Vital Signs Last 24 Hrs  T(C): 36.9 (27 Mar 2025 08:59), Max: 37.2 (26 Mar 2025 15:55)  T(F): 98.4 (27 Mar 2025 08:59), Max: 98.9 (26 Mar 2025 15:55)  HR: 66 (27 Mar 2025 08:59) (59 - 75)  BP: 102/75 (27 Mar 2025 08:59) (102/75 - 149/54)  BP(mean): --  RR: 16 (27 Mar 2025 08:59) (16 - 18)  SpO2: 94% (27 Mar 2025 08:59) (93% - 95%)    Parameters below as of 27 Mar 2025 08:59  Patient On (Oxygen Delivery Method): room air    I&O's Summary    26 Mar 2025 07:01  -  27 Mar 2025 07:00  --------------------------------------------------------  IN: 360 mL / OUT: 3710 mL / NET: -3350 mL    27 Mar 2025 07:01  -  27 Mar 2025 12:49  --------------------------------------------------------  IN: 0 mL / OUT: 600 mL / NET: -600 mL      I&O's Detail    26 Mar 2025 07:01  -  27 Mar 2025 07:00  --------------------------------------------------------  IN:    Oral Fluid: 360 mL  Total IN: 360 mL    OUT:    Bulb (mL): 210 mL    Indwelling Catheter - Urethral (mL): 3500 mL  Total OUT: 3710 mL    Total NET: -3350 mL      27 Mar 2025 07:01  -  27 Mar 2025 12:49  --------------------------------------------------------  IN:  Total IN: 0 mL    OUT:    Indwelling Catheter - Urethral (mL): 600 mL  Total OUT: 600 mL    Total NET: -600 mL          Physical Exam  Gen: NAD, comfortable in bed  Neuro: A&Ox3  Lungs: No resp distress  Abd: Soft, ND, + incisional tenderness, no rebound no guarding.  +YING in place with serosanguinous fluid  : cox in place with yellow urine   Extremities: No LE edema bl                          9.1    6.27  )-----------( 154      ( 27 Mar 2025 04:27 )             26.8       03-27    141  |  113[H]  |  23  ----------------------------<  104[H]  4.8   |  26  |  1.28    Ca    8.4[L]      27 Mar 2025 04:27          Assessment and Plan:   · Assessment    79y Female s/p Robot-assisted surgical removal of neoplasm of ureter, stent placement, Creation of left Boari flap  Labs appreciated. Reviewed with Dr. Ortiz  Abdominal Flat and Upright X-ray with stent position  Tolerating reg diet  OOB/ambulating  D/C YING prior to dc home  D/C with cox to leg bag     Case discussed with Dr. Ortiz   · Subjective and Objective:   79y Female with POD 2 s/p Robot-assisted surgical removal of neoplasm of ureter, stent placement, Creation of left Boari flap. Pt is pain controlled, +flatus, tolerating reg diet. Denies CP, palpitation, SOB, N/V/abdominal pain    Vital Signs Last 24 Hrs  T(C): 36.9 (27 Mar 2025 08:59), Max: 37.2 (26 Mar 2025 15:55)  T(F): 98.4 (27 Mar 2025 08:59), Max: 98.9 (26 Mar 2025 15:55)  HR: 66 (27 Mar 2025 08:59) (59 - 75)  BP: 102/75 (27 Mar 2025 08:59) (102/75 - 149/54)  BP(mean): --  RR: 16 (27 Mar 2025 08:59) (16 - 18)  SpO2: 94% (27 Mar 2025 08:59) (93% - 95%)    Parameters below as of 27 Mar 2025 08:59  Patient On (Oxygen Delivery Method): room air    I&O's Summary    26 Mar 2025 07:01  -  27 Mar 2025 07:00  --------------------------------------------------------  IN: 360 mL / OUT: 3710 mL / NET: -3350 mL    27 Mar 2025 07:01  -  27 Mar 2025 12:49  --------------------------------------------------------  IN: 0 mL / OUT: 600 mL / NET: -600 mL      I&O's Detail    26 Mar 2025 07:01  -  27 Mar 2025 07:00  --------------------------------------------------------  IN:    Oral Fluid: 360 mL  Total IN: 360 mL    OUT:    Bulb (mL): 210 mL    Indwelling Catheter - Urethral (mL): 3500 mL  Total OUT: 3710 mL    Total NET: -3350 mL      27 Mar 2025 07:01  -  27 Mar 2025 12:49  --------------------------------------------------------  IN:  Total IN: 0 mL    OUT:    Indwelling Catheter - Urethral (mL): 600 mL  Total OUT: 600 mL    Total NET: -600 mL          Physical Exam  Gen: NAD, comfortable in bed  Neuro: A&Ox3  Lungs: No resp distress  Abd: Soft, ND, + incisional tenderness, no rebound no guarding.  +YING in place with serosanguinous fluid  : cox in place with yellow urine   Extremities: No LE edema bl                          9.1    6.27  )-----------( 154      ( 27 Mar 2025 04:27 )             26.8       03-27    141  |  113[H]  |  23  ----------------------------<  104[H]  4.8   |  26  |  1.28    Ca    8.4[L]      27 Mar 2025 04:27          Assessment and Plan:   · Assessment    79y Female s/p Robot-assisted surgical removal of neoplasm of ureter, stent placement, Creation of left Boari flap  Labs appreciated. Reviewed with Dr. Ortiz  Abdominal Flat and Upright X-ray with stent position  Tolerating reg diet  OOB/ambulating  YING removed at bedside without any complications, covered with gauze and tape.  D/C with cox to leg bag     Case discussed with Dr. Ortiz

## 2025-03-27 NOTE — DISCHARGE NOTE NURSING/CASE MANAGEMENT/SOCIAL WORK - PATIENT PORTAL LINK FT
You can access the FollowMyHealth Patient Portal offered by [facility] by registering at the following website: [email or web address]. By joining [facility]’s FollowMyHealth portal, you will also be able to view your health information using other applications (apps) compatible with our system.

## 2025-03-27 NOTE — DISCHARGE NOTE NURSING/CASE MANAGEMENT/SOCIAL WORK - FINANCIAL ASSISTANCE
[facility] provides services at a reduced cost to those who are determined to be eligible through [facility]’s financial assistance program. Information regarding [facility]’s financial assistance program can be found by going to [email or web address] or by calling [phone].

## 2025-03-27 NOTE — PROGRESS NOTE ADULT - ASSESSMENT
79y Female s/p Robot-assisted surgical removal of neoplasm of ureter, stent placement, Creation of left Boari flap    Adv to Reg diet  Abdominal Flat and Upright X-ray to check stent position  Ambulate  Monitor Urine and YING output  Continue IV fluids  Probable d/c home tomorrow  Ck am labs  Above discussed with Dr. Ortiz   79y Female s/p Robot-assisted surgical removal of neoplasm of ureter, stent placement, Creation of left Boari flap  Labs appreciated. Reviewed with Dr. Ortiz  Abdominal Flat and Upright X-ray with stent position  Tolerating reg diet  OOB/ambulating  D/C YING prior to dc home  D/C with cox to leg bag     Case discussed with Dr. Ortiz

## 2025-03-27 NOTE — PROGRESS NOTE ADULT - SUBJECTIVE AND OBJECTIVE BOX
79y Female with POD 2 s/p Robot-assisted surgical removal of neoplasm of ureter, stent placement, Creation of left Boari flap. Pt is pain controlled, +flatus, tolerating reg diet. Denies CP, palpitation, SOB, N/V/abdominal pain    acetaminophen     Tablet .. 650 milliGRAM(s) Oral every 6 hours PRN  acetaminophen   IVPB .. 1000 milliGRAM(s) IV Intermittent once  atorvastatin 40 milliGRAM(s) Oral at bedtime  heparin   Injectable 5000 Unit(s) SubCutaneous every 12 hours  HYDROmorphone  Injectable 0.5 milliGRAM(s) IV Push every 4 hours PRN  losartan 50 milliGRAM(s) Oral daily  metoprolol succinate ER 25 milliGRAM(s) Oral at bedtime  ondansetron Injectable 4 milliGRAM(s) IV Push every 6 hours PRN  oxybutynin 5 milliGRAM(s) Oral every 8 hours PRN  oxyCODONE    IR 10 milliGRAM(s) Oral every 4 hours PRN  oxyCODONE    IR 5 milliGRAM(s) Oral every 6 hours PRN  predniSONE   Tablet 1 milliGRAM(s) Oral at bedtime  sodium chloride 0.9%. 1000 milliLiter(s) IV Continuous <Continuous>      Vital Signs Last 24 Hrs  T(C): 36.9 (27 Mar 2025 08:59), Max: 37.2 (26 Mar 2025 15:55)  T(F): 98.4 (27 Mar 2025 08:59), Max: 98.9 (26 Mar 2025 15:55)  HR: 66 (27 Mar 2025 08:59) (59 - 75)  BP: 102/75 (27 Mar 2025 08:59) (102/75 - 149/54)  BP(mean): --  RR: 16 (27 Mar 2025 08:59) (16 - 18)  SpO2: 94% (27 Mar 2025 08:59) (93% - 95%)    Parameters below as of 27 Mar 2025 08:59  Patient On (Oxygen Delivery Method): room air        I&O's Summary    26 Mar 2025 07:01  -  27 Mar 2025 07:00  --------------------------------------------------------  IN: 360 mL / OUT: 3710 mL / NET: -3350 mL    27 Mar 2025 07:01  -  27 Mar 2025 12:49  --------------------------------------------------------  IN: 0 mL / OUT: 600 mL / NET: -600 mL      I&O's Detail    26 Mar 2025 07:01  -  27 Mar 2025 07:00  --------------------------------------------------------  IN:    Oral Fluid: 360 mL  Total IN: 360 mL    OUT:    Bulb (mL): 210 mL    Indwelling Catheter - Urethral (mL): 3500 mL  Total OUT: 3710 mL    Total NET: -3350 mL      27 Mar 2025 07:01  -  27 Mar 2025 12:49  --------------------------------------------------------  IN:  Total IN: 0 mL    OUT:    Indwelling Catheter - Urethral (mL): 600 mL  Total OUT: 600 mL    Total NET: -600 mL          Physical Exam  Gen: NAD, comfortable in bed  Neuro: A&Ox3  Lungs: No resp distress  Abd: Soft, ND, + incisional tenderness, no rebound no guarding.  +YING in place with serosanguinous fluid  : cox in place with yellow urine   Extremities: No LE edema bl                          9.1    6.27  )-----------( 154      ( 27 Mar 2025 04:27 )             26.8       03-27    141  |  113[H]  |  23  ----------------------------<  104[H]  4.8   |  26  |  1.28    Ca    8.4[L]      27 Mar 2025 04:27

## 2025-03-27 NOTE — DISCHARGE NOTE PROVIDER - CARE PROVIDER_API CALL
Sanchez Ortiz  Urology  [address] [place] [id number]  Phone: [phone]  Fax: [phone]  Scheduled Appointment: 04/07/2025

## 2025-03-27 NOTE — DISCHARGE NOTE PROVIDER - NSDCCPTREATMENT_GEN_ALL_CORE_FT
PRINCIPAL PROCEDURE  Procedure: Robot-assisted surgical removal of neoplasm of ureter  Findings and Treatment: stent placement

## 2025-03-28 LAB — SURGICAL PATHOLOGY STUDY: SIGNIFICANT CHANGE UP

## 2025-04-02 DIAGNOSIS — I25.10 ATHEROSCLEROTIC HEART DISEASE OF NATIVE CORONARY ARTERY WITHOUT ANGINA PECTORIS: ICD-10-CM

## 2025-04-02 DIAGNOSIS — Z88.2 ALLERGY STATUS TO SULFONAMIDES: ICD-10-CM

## 2025-04-02 DIAGNOSIS — M19.90 UNSPECIFIED OSTEOARTHRITIS, UNSPECIFIED SITE: ICD-10-CM

## 2025-04-02 DIAGNOSIS — C66.2 MALIGNANT NEOPLASM OF LEFT URETER: ICD-10-CM

## 2025-04-02 DIAGNOSIS — Z95.5 PRESENCE OF CORONARY ANGIOPLASTY IMPLANT AND GRAFT: ICD-10-CM

## 2025-04-02 DIAGNOSIS — Z79.82 LONG TERM (CURRENT) USE OF ASPIRIN: ICD-10-CM

## 2025-04-02 DIAGNOSIS — I10 ESSENTIAL (PRIMARY) HYPERTENSION: ICD-10-CM

## 2025-04-02 DIAGNOSIS — E78.5 HYPERLIPIDEMIA, UNSPECIFIED: ICD-10-CM

## 2025-04-04 ENCOUNTER — APPOINTMENT (OUTPATIENT)
Dept: UROLOGY | Facility: CLINIC | Age: 80
End: 2025-04-04
Payer: MEDICARE

## 2025-04-04 PROBLEM — R31.0 INTERMITTENT GROSS HEMATURIA: Status: ACTIVE | Noted: 2025-04-04

## 2025-04-04 PROCEDURE — 99024 POSTOP FOLLOW-UP VISIT: CPT

## 2025-04-09 ENCOUNTER — OUTPATIENT (OUTPATIENT)
Dept: OUTPATIENT SERVICES | Facility: HOSPITAL | Age: 80
LOS: 1 days | End: 2025-04-09
Payer: MEDICARE

## 2025-04-09 ENCOUNTER — APPOINTMENT (OUTPATIENT)
Dept: CT IMAGING | Facility: CLINIC | Age: 80
End: 2025-04-09
Payer: MEDICARE

## 2025-04-09 DIAGNOSIS — C67.9 MALIGNANT NEOPLASM OF BLADDER, UNSPECIFIED: ICD-10-CM

## 2025-04-09 DIAGNOSIS — Z96.82 PRESENCE OF NEUROSTIMULATOR: Chronic | ICD-10-CM

## 2025-04-09 DIAGNOSIS — Z98.890 OTHER SPECIFIED POSTPROCEDURAL STATES: Chronic | ICD-10-CM

## 2025-04-09 DIAGNOSIS — Z90.49 ACQUIRED ABSENCE OF OTHER SPECIFIED PARTS OF DIGESTIVE TRACT: Chronic | ICD-10-CM

## 2025-04-09 DIAGNOSIS — G56.00 CARPAL TUNNEL SYNDROME, UNSPECIFIED UPPER LIMB: Chronic | ICD-10-CM

## 2025-04-09 DIAGNOSIS — N32.89 OTHER SPECIFIED DISORDERS OF BLADDER: ICD-10-CM

## 2025-04-09 DIAGNOSIS — Z98.61 CORONARY ANGIOPLASTY STATUS: Chronic | ICD-10-CM

## 2025-04-09 PROCEDURE — 74176 CT ABD & PELVIS W/O CONTRAST: CPT | Mod: 26

## 2025-04-09 PROCEDURE — 74176 CT ABD & PELVIS W/O CONTRAST: CPT

## 2025-04-11 ENCOUNTER — APPOINTMENT (OUTPATIENT)
Dept: UROLOGY | Facility: CLINIC | Age: 80
End: 2025-04-11

## 2025-04-11 VITALS
HEIGHT: 62 IN | HEART RATE: 72 BPM | SYSTOLIC BLOOD PRESSURE: 153 MMHG | BODY MASS INDEX: 24.29 KG/M2 | OXYGEN SATURATION: 100 % | WEIGHT: 132 LBS | DIASTOLIC BLOOD PRESSURE: 59 MMHG

## 2025-04-11 DIAGNOSIS — R31.0 GROSS HEMATURIA: ICD-10-CM

## 2025-04-11 PROCEDURE — A4216: CPT | Mod: NC

## 2025-04-11 PROCEDURE — 51700 IRRIGATION OF BLADDER: CPT | Mod: 58

## 2025-04-11 PROCEDURE — 99024 POSTOP FOLLOW-UP VISIT: CPT

## 2025-04-13 RX ORDER — OXYBUTYNIN CHLORIDE 5 MG/1
5 TABLET ORAL
Qty: 21 | Refills: 0 | Status: ACTIVE | COMMUNITY
Start: 2025-04-09 | End: 1900-01-01

## 2025-04-16 ENCOUNTER — APPOINTMENT (OUTPATIENT)
Dept: UROLOGY | Facility: CLINIC | Age: 80
End: 2025-04-16
Payer: MEDICARE

## 2025-04-16 DIAGNOSIS — C68.9 MALIGNANT NEOPLASM OF URINARY ORGAN, UNSPECIFIED: ICD-10-CM

## 2025-04-16 PROCEDURE — 99024 POSTOP FOLLOW-UP VISIT: CPT

## 2025-04-23 ENCOUNTER — EMERGENCY (EMERGENCY)
Facility: HOSPITAL | Age: 80
LOS: 1 days | End: 2025-04-23
Attending: EMERGENCY MEDICINE
Payer: MEDICARE

## 2025-04-23 VITALS
OXYGEN SATURATION: 99 % | RESPIRATION RATE: 16 BRPM | HEART RATE: 75 BPM | WEIGHT: 145.06 LBS | TEMPERATURE: 98 F | DIASTOLIC BLOOD PRESSURE: 93 MMHG | HEIGHT: 62 IN | SYSTOLIC BLOOD PRESSURE: 196 MMHG

## 2025-04-23 VITALS
OXYGEN SATURATION: 98 % | TEMPERATURE: 98 F | HEART RATE: 64 BPM | RESPIRATION RATE: 17 BRPM | SYSTOLIC BLOOD PRESSURE: 187 MMHG | DIASTOLIC BLOOD PRESSURE: 97 MMHG

## 2025-04-23 DIAGNOSIS — Z98.890 OTHER SPECIFIED POSTPROCEDURAL STATES: Chronic | ICD-10-CM

## 2025-04-23 DIAGNOSIS — G56.00 CARPAL TUNNEL SYNDROME, UNSPECIFIED UPPER LIMB: Chronic | ICD-10-CM

## 2025-04-23 DIAGNOSIS — Z96.82 PRESENCE OF NEUROSTIMULATOR: Chronic | ICD-10-CM

## 2025-04-23 DIAGNOSIS — Z98.61 CORONARY ANGIOPLASTY STATUS: Chronic | ICD-10-CM

## 2025-04-23 DIAGNOSIS — Z90.49 ACQUIRED ABSENCE OF OTHER SPECIFIED PARTS OF DIGESTIVE TRACT: Chronic | ICD-10-CM

## 2025-04-23 LAB
ALBUMIN SERPL ELPH-MCNC: 4.1 G/DL — SIGNIFICANT CHANGE UP (ref 3.3–5.2)
ALP SERPL-CCNC: 80 U/L — SIGNIFICANT CHANGE UP (ref 40–120)
ALT FLD-CCNC: 11 U/L — SIGNIFICANT CHANGE UP
ANION GAP SERPL CALC-SCNC: 14 MMOL/L — SIGNIFICANT CHANGE UP (ref 5–17)
APTT BLD: 28.3 SEC — SIGNIFICANT CHANGE UP (ref 26.1–36.8)
AST SERPL-CCNC: 15 U/L — SIGNIFICANT CHANGE UP
BASOPHILS # BLD AUTO: 0.03 K/UL — SIGNIFICANT CHANGE UP (ref 0–0.2)
BASOPHILS NFR BLD AUTO: 0.6 % — SIGNIFICANT CHANGE UP (ref 0–2)
BILIRUB SERPL-MCNC: 0.7 MG/DL — SIGNIFICANT CHANGE UP (ref 0.4–2)
BUN SERPL-MCNC: 27.6 MG/DL — HIGH (ref 8–20)
CALCIUM SERPL-MCNC: 9.6 MG/DL — SIGNIFICANT CHANGE UP (ref 8.4–10.5)
CHLORIDE SERPL-SCNC: 101 MMOL/L — SIGNIFICANT CHANGE UP (ref 96–108)
CO2 SERPL-SCNC: 24 MMOL/L — SIGNIFICANT CHANGE UP (ref 22–29)
CREAT SERPL-MCNC: 1.19 MG/DL — SIGNIFICANT CHANGE UP (ref 0.5–1.3)
EGFR: 47 ML/MIN/1.73M2 — LOW
EGFR: 47 ML/MIN/1.73M2 — LOW
EOSINOPHIL # BLD AUTO: 0.29 K/UL — SIGNIFICANT CHANGE UP (ref 0–0.5)
EOSINOPHIL NFR BLD AUTO: 5.4 % — SIGNIFICANT CHANGE UP (ref 0–6)
GLUCOSE SERPL-MCNC: 117 MG/DL — HIGH (ref 70–99)
HCT VFR BLD CALC: 33 % — LOW (ref 34.5–45)
HGB BLD-MCNC: 11.2 G/DL — LOW (ref 11.5–15.5)
IMM GRANULOCYTES # BLD AUTO: 0.01 K/UL — SIGNIFICANT CHANGE UP (ref 0–0.07)
IMM GRANULOCYTES NFR BLD AUTO: 0.2 % — SIGNIFICANT CHANGE UP (ref 0–0.9)
INR BLD: 0.95 RATIO — SIGNIFICANT CHANGE UP (ref 0.85–1.16)
LYMPHOCYTES # BLD AUTO: 0.99 K/UL — LOW (ref 1–3.3)
LYMPHOCYTES NFR BLD AUTO: 18.3 % — SIGNIFICANT CHANGE UP (ref 13–44)
MCHC RBC-ENTMCNC: 30.8 PG — SIGNIFICANT CHANGE UP (ref 27–34)
MCHC RBC-ENTMCNC: 33.9 G/DL — SIGNIFICANT CHANGE UP (ref 32–36)
MCV RBC AUTO: 90.7 FL — SIGNIFICANT CHANGE UP (ref 80–100)
MONOCYTES # BLD AUTO: 0.42 K/UL — SIGNIFICANT CHANGE UP (ref 0–0.9)
MONOCYTES NFR BLD AUTO: 7.7 % — SIGNIFICANT CHANGE UP (ref 2–14)
NEUTROPHILS # BLD AUTO: 3.68 K/UL — SIGNIFICANT CHANGE UP (ref 1.8–7.4)
NEUTROPHILS NFR BLD AUTO: 67.8 % — SIGNIFICANT CHANGE UP (ref 43–77)
NRBC # BLD AUTO: 0 K/UL — SIGNIFICANT CHANGE UP (ref 0–0)
NRBC # FLD: 0 K/UL — SIGNIFICANT CHANGE UP (ref 0–0)
NRBC BLD AUTO-RTO: 0 /100 WBCS — SIGNIFICANT CHANGE UP (ref 0–0)
PLATELET # BLD AUTO: 215 K/UL — SIGNIFICANT CHANGE UP (ref 150–400)
PMV BLD: 10.3 FL — SIGNIFICANT CHANGE UP (ref 7–13)
POTASSIUM SERPL-MCNC: 4.8 MMOL/L — SIGNIFICANT CHANGE UP (ref 3.5–5.3)
POTASSIUM SERPL-SCNC: 4.8 MMOL/L — SIGNIFICANT CHANGE UP (ref 3.5–5.3)
PROT SERPL-MCNC: 7.1 G/DL — SIGNIFICANT CHANGE UP (ref 6.6–8.7)
PROTHROM AB SERPL-ACNC: 11 SEC — SIGNIFICANT CHANGE UP (ref 9.9–13.4)
RBC # BLD: 3.64 M/UL — LOW (ref 3.8–5.2)
RBC # FLD: 12.5 % — SIGNIFICANT CHANGE UP (ref 10.3–14.5)
SODIUM SERPL-SCNC: 139 MMOL/L — SIGNIFICANT CHANGE UP (ref 135–145)
TROPONIN T, HIGH SENSITIVITY RESULT: 21 NG/L — SIGNIFICANT CHANGE UP (ref 0–51)
WBC # BLD: 5.42 K/UL — SIGNIFICANT CHANGE UP (ref 3.8–10.5)
WBC # FLD AUTO: 5.42 K/UL — SIGNIFICANT CHANGE UP (ref 3.8–10.5)

## 2025-04-23 PROCEDURE — 71045 X-RAY EXAM CHEST 1 VIEW: CPT | Mod: 26

## 2025-04-23 PROCEDURE — 84484 ASSAY OF TROPONIN QUANT: CPT

## 2025-04-23 PROCEDURE — 70450 CT HEAD/BRAIN W/O DYE: CPT | Mod: 26,XU

## 2025-04-23 PROCEDURE — 36415 COLL VENOUS BLD VENIPUNCTURE: CPT

## 2025-04-23 PROCEDURE — 70498 CT ANGIOGRAPHY NECK: CPT | Mod: 26

## 2025-04-23 PROCEDURE — 70498 CT ANGIOGRAPHY NECK: CPT | Mod: MC

## 2025-04-23 PROCEDURE — 71045 X-RAY EXAM CHEST 1 VIEW: CPT

## 2025-04-23 PROCEDURE — 95819 EEG AWAKE AND ASLEEP: CPT

## 2025-04-23 PROCEDURE — 99285 EMERGENCY DEPT VISIT HI MDM: CPT

## 2025-04-23 PROCEDURE — 95816 EEG AWAKE AND DROWSY: CPT | Mod: 26

## 2025-04-23 PROCEDURE — 85730 THROMBOPLASTIN TIME PARTIAL: CPT

## 2025-04-23 PROCEDURE — 99284 EMERGENCY DEPT VISIT MOD MDM: CPT

## 2025-04-23 PROCEDURE — 85025 COMPLETE CBC W/AUTO DIFF WBC: CPT

## 2025-04-23 PROCEDURE — 80053 COMPREHEN METABOLIC PANEL: CPT

## 2025-04-23 PROCEDURE — 0042T: CPT | Mod: MC

## 2025-04-23 PROCEDURE — 70496 CT ANGIOGRAPHY HEAD: CPT | Mod: 26

## 2025-04-23 PROCEDURE — 82962 GLUCOSE BLOOD TEST: CPT

## 2025-04-23 PROCEDURE — 99284 EMERGENCY DEPT VISIT MOD MDM: CPT | Mod: 25

## 2025-04-23 PROCEDURE — 70496 CT ANGIOGRAPHY HEAD: CPT | Mod: MC

## 2025-04-23 PROCEDURE — 85610 PROTHROMBIN TIME: CPT

## 2025-04-23 PROCEDURE — 70450 CT HEAD/BRAIN W/O DYE: CPT | Mod: MC

## 2025-04-23 PROCEDURE — 0042T: CPT

## 2025-04-23 NOTE — ED ADULT NURSE NOTE - NSICDXPASTSURGICALHX_GEN_ALL_CORE_FT
PAST SURGICAL HISTORY:  Carpal tunnel syndrome     H/O cataract extraction     H/O colonoscopy     History of biopsy of bladder     History of bunionectomy     History of cryosurgery left finger    History of right-sided carotid endarterectomy     Post PTCA     S/P appendectomy     S/P placement of nerve stimulator

## 2025-04-23 NOTE — ED PROVIDER NOTE - CARE PROVIDER_API CALL
See Lama  Neurology  [address] [place] [address], [facility] [address]  [place] [address]  Phone: [phone]  Fax: [phone]  Follow Up Time:

## 2025-04-23 NOTE — ED ADULT NURSE REASSESSMENT NOTE - NS ED NURSE REASSESS COMMENT FT1
Pt sitting quietly in stretcher NAD noted at this time. Pt attached to EEG monitoring in place NSR on CM. No new orders at this time pt safety ongoing.

## 2025-04-23 NOTE — ED ADULT NURSE NOTE - OBJECTIVE STATEMENT
pt alert and oriented x4 comes in c/o sudden onset confusion at 0950. pt now alert and oriented MAEE. pt states this has happened once in the past. code stroke initiated. on ASA 81

## 2025-04-23 NOTE — ED ADULT NURSE REASSESSMENT NOTE - NS ED NURSE REASSESS COMMENT FT1
Received pt from blank RN. pt sitting quietly in stretcher NAD noted at this time. pt awaiting CT. NSR on CM. No new orders at this time pt safety ongoing.

## 2025-04-23 NOTE — CONSULT NOTE ADULT - SUBJECTIVE AND OBJECTIVE BOX
[facility]                                     [facility] [place]                                 [facility]                                  [address]                                        [place], [place], [address]                                             [phone]    CC: memory loss  HPI: The patient is a 79y Female who presented with a 1-2 hour episode of memory loss where she forgot what she had for breakfast,  that it was recently Easter, that the Keller had  and the name o her son's dog.  Her memory has returned to baseline.  She had no other neurological symptoms witnessed by her son during this time. She has had episode ofg TGA two years ago with reported negative work up. Neurology is asked to evalaute    PAST MEDICAL & SURGICAL HISTORY:  HTN (hypertension)      HLD (hyperlipidemia)      Bone tumor (benign)  left hand      OA (osteoarthritis)      Bladder tumor      History of polymyalgia rheumatica      H/O carotid stenosis      Bladder cancer      CAD (coronary artery disease)      Stented coronary artery      S/P placement of nerve stimulator      S/P appendectomy      Carpal tunnel syndrome      History of cryosurgery  left finger      History of bunionectomy      History of right-sided carotid endarterectomy      H/O colonoscopy      Post PTCA      H/O cataract extraction      S/P placement of nerve stimulator      History of biopsy of bladder          MEDICATIONS  (STANDING):    MEDICATIONS  (PRN):      Allergies    Bactrim (Short breath; Rash)    Intolerances        SOCIAL HISTORY:  old tob,   no significant alcohol use  no drugs    FAMILY HISTORY:  FH: hypertension (Mother)    Family history of early CAD (Father)    Family history of benign bladder tumor (Sibling)      ROS: 14 point ROS negative other than what is present in HPI or below    Vital Signs Last 24 Hrs  T(C): 36.7 (2025 15:12), Max: 36.7 (2025 10:37)  T(F): 98.1 (2025 15:12), Max: 98.1 (2025 15:12)  HR: 64 (2025 15:12) (64 - 75)  BP: 187/97 (2025 15:12) (187/97 - 196/93)  BP(mean): --  RR: 17 (2025 15:12) (16 - 18)  SpO2: 98% (2025 15:12) (98% - 99%)    Parameters below as of 2025 15:12  Patient On (Oxygen Delivery Method): room air      General: NAD    Detailed Neurologic Exam:    Mental status: The patient is awake and alert and has normal attention span.  The patient is fully oriented in 3 spheres. The patient is oriented to current events. The patient is able to name objects, follow commands, repeat sentences.    Cranial nerves: Pupils equal and react symmetrically to light. There is no visual field deficit to confrontation. Extraocular motion is full with no nystagmus. There is no ptosis. Facial sensation is intact. Facial musculature is symmetric.     Motor: There is normal bulk and tone.  There is no tremor.  Strength is 5/5 in the right arm and leg.   Strength is 5/5 in the left arm and leg.    Sensation: Intact to light touch in 4 extremities    Cerebellar: There is no dysmetria on finger to nose testing.    Gait : deferred    LABS:                         11.2   5.42  )-----------( 215      ( 2025 10:38 )             33.0           139  |  101  |  27.6[H]  ----------------------------<  117[H]  4.8   |  24.0  |  1.19    Ca    9.6      2025 10:38    TPro  7.1  /  Alb  4.1  /  TBili  0.7  /  DBili  x   /  AST  15  /  ALT  11  /  AlkPhos  80  -      PT/INR - ( 2025 10:38 )   PT: 11.0 sec;   INR: 0.95 ratio         PTT - ( 2025 10:38 )  PTT:28.3 sec    EEG Classification / Summary 25:  Normal routine EEG in the awake, drowsy states.   No focal or epileptiform abnormalities are captured.   No seizures.    Clinical Impression:  A normal routine EEG neither refutes nor supports a diagnosis of epilepsy.   No epileptiform abnormalities or seizures.    RADIOLOGY & ADDITIONAL STUDIES (independently reviewed unless otherwise noted):  CT Brain Stroke Protocol (25 @ 10:52)   IMPRESSION:  1. No intracranial hemorrhage is appreciated.  2. No intracranial mass lesion is appreciated.  3. MR may provide higher sensitivity imaging evaluation for the clinical   indication of acute infarction.    CT Angio Brain/Neck and Perfusion Scan Stroke Protocol  w/ IV Cont (25 @ 11:01)   IMPRESSION:  1.   RIGHT CAROTID SYSTEM:    Atherosclerotic plaque carotid bulb without    hemodynamically significant stenosis.  2.   LEFT CAROTID SYSTEM:     Atherosclerotic plaque carotid bulb without    hemodynamically significant stenosis.  3.   VERTEBRAL CIRCULATION:    Atherosclerotic plaque left vertebral   ostium without hemodynamically significant stenosis.  4.  ANTERIOR INTRACRANIAL CIRCULATION:     Annual atherosclerosis   cavernous and clinoid segments of the internal carotid arteries,   mild-to-moderate.  5.  POSTERIOR INTRACRANIAL CIRCULATION:    Unremarkable.  6.  No large vessel occlusion.  7.  BRAIN PERFUSION:   No acute infarction of the brain is convincingly   demonstrated.

## 2025-04-23 NOTE — ED PROVIDER NOTE - PROGRESS NOTE DETAILS
Labs and CT results as noted.  Patient seen evaluated by neurology/Dr. Lama   who ordered spot EEG which was negative and cleared patient for discharge home  with follow-up as outpatient

## 2025-04-23 NOTE — EEG REPORT - NS EEG TEXT BOX
NINA DUTTA [id number]     Study Date: 04-23-25  Duration: 21 min  --------------------------------------------------------------------------------------------------  History:  CC/ HPI Patient is a 79y old  Female who presents with a chief complaint of   MEDICATIONS  (STANDING):    --------------------------------------------------------------------------------------------------  Study Interpretation:    [[[Abbreviation Key:  PDR=alpha rhythm/posterior dominant rhythm. A-P=anterior posterior.  Amplitude: ‘very low’:<20; ‘low’:20-49; ‘medium’:; ‘high’:>150uV.  Persistence for periodic/rhythmic patterns (% of epoch) ‘rare’:<1%; ‘occasional’:1-10%; ‘frequent’:10-50%; ‘abundant’:50-90%; ‘continuous’:>90%.  Persistence for sporadic discharges: ‘rare’:<1/hr; ‘occasional’:1/min-1/hr; ‘frequent’:>1/min; ‘abundant’:>1/10 sec.  RPP=rhythmic and periodic patterns; GRDA=generalized rhythmic delta activity; FIRDA=frontal intermittent GRDA; LRDA=lateralized rhythmic delta activity; TIRDA=temporal intermittent rhythmic delta activity;  LPD=PLED=lateralized periodic discharges; GPD=generalized periodic discharges; BIPDs =bilateral independent periodic discharges; Mf=multifocal; SIRPDs=stimulus induced rhythmic, periodic, or ictal appearing discharges; BIRDs=brief potentially ictal rhythmic discharges >4 Hz, lasting .5-10s; PFA (paroxysmal bursts >13 Hz or =8 Hz <10s).  Modifiers: +F=with fast component; +S=with spike component; +R=with rhythmic component.  S-B=burst suppression pattern.  Max=maximal. N1-drowsy; N2-stage II sleep; N3-slow wave sleep. SSS/BETS=small sharp spikes/benign epileptiform transients of sleep. HV=hyperventilation; PS=photic stimulation]]]    Daily EEG Visual Analysis    FINDINGS:      Background:  Continuity: Continuous  Symmetry: Symmetric  Posterior dominant rhythm (PDR): 10.5 Hz, reactive to eye closure. Symmetric low-amplitude frontal beta in wakefulness.  Voltage: Normal  Anterior-Posterior Gradient: Present  Other background findings: None  Breach: Absent    Background Slowing:  Generalized slowing: None  Focal slowing: None    State Changes:   Drowsiness is characterized by fragmentation, attenuation, and slowing of the background activity.  Stage 2 sleep is not captured.    Interictal Findings:  None    Electrographic and Electroclinical seizures:  None    Other Clinical Events:  None    Activation Procedures:   Hyperventilation is not performed.    Photic stimulation is performed and does not elicit any abnormalities.      Artifacts:  Intermittent myogenic and movement artifacts are present.    Single-lead EKG: Regular rhythm, occasional PACs    EEG Classification / Summary:  Normal routine EEG in the awake, drowsy states.   No focal or epileptiform abnormalities are captured.   No seizures.    Clinical Impression:  A normal routine EEG neither refutes nor supports a diagnosis of epilepsy.   No epileptiform abnormalities or seizures.          -------------------------------------------------------------------------------------------------------    Michelle Carr MD  Attending Physician, [facility]    -------------------------------------------------------------------------------------------------------    To reach EEG technologist:  Please use the pager number for the appropriate hospital or contact the .  At [facility] - Pager #: [phone]    To reach EEG-reading physician:  EEG Reading Room Phone #: [phone]  Epilepsy Answering Service after 5PM and before 8:30AM: Phone #: [phone]

## 2025-04-23 NOTE — CONSULT NOTE ADULT - ASSESSMENT
The patient is a 79y Female who is followed by neurology because of transient memory loss    Transient global amnesia  Back at baseline  CT head did not show acute pathology  CTA head/neck and Perfusion scan unremarkable  EEG no seizure/epileptiform activity  Exam normal    discussed with Dr Miguel ELLIS for discharge with outpatient follow up    Thank you for allowing me to participate in the care of your patient    See Lama MD, PhD   [id number]

## 2025-04-23 NOTE — ED PROVIDER NOTE - NSFOLLOWUPINSTRUCTIONS_ED_ALL_ED_FT
continue your prescribed medications as instructed   follow-up with Dr. Reeder this week   follow-up with neurology-call to schedule appointment   return sooner for any problems      Transient global amnesia (TGA) is a rare type of amnesia that causes sudden memory loss. When this happens you cannot remember events from your recent past or make new memories. You may also not know where you are, why you are there, or what the date is. You may ask the same question many times. Unlike other types of amnesia, you do know who you are and you can recognize people that you know. An episode usually does not last more than 6 hours and it rarely happens again.    What causes TGA is not fully known. But, in some cases, an intense workout, sex, or stress may cause an episode. People who get migraines are more likely to have TGA.    Your doctor probably did an exam and ran some tests to rule out certain health problems that can also cause sudden memory loss, such as a stroke, brain tumor, seizure, head injury, or an infection. If your doctor did not find any of these things to be the cause of your memory loss, you will not need treatment and you can go back to your usual activities. Although you may never be able to remember what happened right before or during the episode, the rest of your memory should come back.    TGA does not increase the chance that you will have a stroke or seizures in the future.    Follow-up care is a key part of your treatment and safety. Be sure to make and go to all appointments, and call your doctor if you are having problems. It is also a good idea to know your test results and keep a list of the medicines you take.    How can you care for yourself at home?  There is no treatment for TGA. Expect your symptoms to go away with time.  Take good care of yourself. Eat a healthy diet. Get plenty of rest. Limit how much alcohol you drink. Do not smoke. If you need help quitting, talk to your doctor about stop-smoking programs and medicines. These can increase your chances of quitting for good.  Find healthy ways to deal with stress. Regular exercise is a good way to manage stress.  Talk to your doctor if you have questions about TGA.  When should you call for help?  	  Call 911 anytime you think you may need emergency care. For example, call if:    You passed out (lost consciousness).  You have symptoms of a stroke. These may include:  Sudden numbness, tingling, weakness, or loss of movement in your face, arm, or leg, especially on only one side of your body.  Sudden vision changes.  Sudden trouble speaking.  Sudden confusion or trouble understanding simple statements.  Sudden problems with walking or balance.  A sudden, severe headache that is different from past headaches.  You develop a fever with a stiff neck or a severe headache.  You do not know who you are or where you are.  Call your doctor now or seek immediate medical care if:    You suddenly lose your memory again.  You have a seizure.  You are dizzy.  You are more confused, forgetful, or upset than usual.  You notice changes in your behavior or personality.  You begin to have trouble with familiar things, such as how to read or how to tell time.  Watch closely for changes in your health, and be sure to contact your doctor if you have any problems.

## 2025-04-23 NOTE — ED PROVIDER NOTE - PATIENT PORTAL LINK FT
You can access the FollowMyHealth Patient Portal offered by [facility] by registering at the following website: [email or web address]. By joining [facility]’s FollowMyHealth portal, you will also be able to view your health information using other applications (apps) compatible with our system.

## 2025-04-23 NOTE — ED ADULT NURSE NOTE - AGENT'S NAME
Pt asking for recommendations on what to use for bald spot on head. Can she use Rogaine?  Please call akhil

## 2025-04-23 NOTE — ED PROVIDER NOTE - NEUROLOGICAL, MLM
Alert and oriented, no focal deficits, no motor or sensory deficits,  cranial nerves II through XII intact, muscle strength 5/5 bilaterally upper extremities lower extremities full sensation, NIHSS 0

## 2025-04-23 NOTE — ED PROVIDER NOTE - CLINICAL SUMMARY MEDICAL DECISION MAKING FREE TEXT BOX
79-year-old female with history of prior TIA, hypertension, high cholesterol,  CAD status post coronary stenting, status post recent removal of ureteral mass by urology last month presents to ED via EMS with reported complaint of increased confusion which started suddenly at 9:50 AM today.  Patient apparently went to sign and was asking the name of the dog and  other questions that she should know the answers to.    No reported focal weakness, visual changes, sensory changes, chest pain, shortness of breath, abdominal pain, nausea, vomiting or syncope.   EMS noted patient's BP to be elevated.  En route to the hospital and on arrival patient's memory improving.    patient with prior history of TIA approximately 2 years ago for which she is on baby aspirin. patient states she took all her medications this morning as prescribed.  Patient sent for priority CT head, CTA head neck

## 2025-04-23 NOTE — ED ADULT TRIAGE NOTE - CHIEF COMPLAINT QUOTE
Pt BIBA from home after sudden onset of confusion that started 45 mons ago that was witnessed by the family. Pt's symptoms seem to be improving at this time. Has h/o TIA

## 2025-04-28 ENCOUNTER — OUTPATIENT (OUTPATIENT)
Dept: OUTPATIENT SERVICES | Facility: HOSPITAL | Age: 80
LOS: 1 days | Discharge: HOME | End: 2025-04-28

## 2025-04-28 DIAGNOSIS — Z98.890 OTHER SPECIFIED POSTPROCEDURAL STATES: Chronic | ICD-10-CM

## 2025-04-28 DIAGNOSIS — G56.00 CARPAL TUNNEL SYNDROME, UNSPECIFIED UPPER LIMB: Chronic | ICD-10-CM

## 2025-04-28 DIAGNOSIS — C66.9 MALIGNANT NEOPLASM OF UNSPECIFIED URETER: ICD-10-CM

## 2025-04-28 DIAGNOSIS — Z90.49 ACQUIRED ABSENCE OF OTHER SPECIFIED PARTS OF DIGESTIVE TRACT: Chronic | ICD-10-CM

## 2025-04-28 DIAGNOSIS — Z98.61 CORONARY ANGIOPLASTY STATUS: Chronic | ICD-10-CM

## 2025-04-28 DIAGNOSIS — Z96.82 PRESENCE OF NEUROSTIMULATOR: Chronic | ICD-10-CM

## 2025-05-05 ENCOUNTER — APPOINTMENT (OUTPATIENT)
Dept: RHEUMATOLOGY | Facility: CLINIC | Age: 80
End: 2025-05-05
Payer: MEDICARE

## 2025-05-05 ENCOUNTER — NON-APPOINTMENT (OUTPATIENT)
Age: 80
End: 2025-05-05

## 2025-05-05 VITALS
HEART RATE: 63 BPM | TEMPERATURE: 97.8 F | DIASTOLIC BLOOD PRESSURE: 60 MMHG | HEIGHT: 62 IN | OXYGEN SATURATION: 93 % | SYSTOLIC BLOOD PRESSURE: 140 MMHG

## 2025-05-05 DIAGNOSIS — M35.3 POLYMYALGIA RHEUMATICA: ICD-10-CM

## 2025-05-05 PROCEDURE — 99214 OFFICE O/P EST MOD 30 MIN: CPT

## 2025-05-06 ENCOUNTER — NON-APPOINTMENT (OUTPATIENT)
Age: 80
End: 2025-05-06

## 2025-05-06 ENCOUNTER — APPOINTMENT (OUTPATIENT)
Dept: HEMATOLOGY ONCOLOGY | Facility: CLINIC | Age: 80
End: 2025-05-06
Payer: MEDICARE

## 2025-05-06 VITALS
HEIGHT: 62 IN | SYSTOLIC BLOOD PRESSURE: 178 MMHG | HEART RATE: 61 BPM | OXYGEN SATURATION: 95 % | BODY MASS INDEX: 24.29 KG/M2 | DIASTOLIC BLOOD PRESSURE: 74 MMHG | TEMPERATURE: 98 F | WEIGHT: 132 LBS

## 2025-05-06 PROCEDURE — 99205 OFFICE O/P NEW HI 60 MIN: CPT

## 2025-05-07 ENCOUNTER — APPOINTMENT (OUTPATIENT)
Dept: UROLOGY | Facility: CLINIC | Age: 80
End: 2025-05-07
Payer: MEDICARE

## 2025-05-07 ENCOUNTER — APPOINTMENT (OUTPATIENT)
Dept: UROLOGY | Facility: CLINIC | Age: 80
End: 2025-05-07

## 2025-05-07 VITALS
HEIGHT: 62 IN | DIASTOLIC BLOOD PRESSURE: 70 MMHG | WEIGHT: 130 LBS | BODY MASS INDEX: 23.92 KG/M2 | SYSTOLIC BLOOD PRESSURE: 131 MMHG | HEART RATE: 70 BPM | OXYGEN SATURATION: 97 %

## 2025-05-07 LAB
BILIRUB UR QL STRIP: ABNORMAL
CLARITY UR: CLEAR
COLLECTION METHOD: NORMAL
GLUCOSE UR-MCNC: NORMAL
HCG UR QL: 1 EU/DL
HGB UR QL STRIP.AUTO: ABNORMAL
KETONES UR-MCNC: ABNORMAL
LEUKOCYTE ESTERASE UR QL STRIP: ABNORMAL
NITRITE UR QL STRIP: NORMAL
PH UR STRIP: 5.5
PROT UR STRIP-MCNC: >=300
SP GR UR STRIP: >=1.03

## 2025-05-07 PROCEDURE — 52310 CYSTOSCOPY AND TREATMENT: CPT | Mod: 58

## 2025-05-07 PROCEDURE — 81003 URINALYSIS AUTO W/O SCOPE: CPT | Mod: QW

## 2025-05-14 ENCOUNTER — RESULT REVIEW (OUTPATIENT)
Age: 80
End: 2025-05-14

## 2025-05-14 ENCOUNTER — APPOINTMENT (OUTPATIENT)
Age: 80
End: 2025-05-14

## 2025-05-14 ENCOUNTER — APPOINTMENT (OUTPATIENT)
Dept: HEMATOLOGY ONCOLOGY | Facility: CLINIC | Age: 80
End: 2025-05-14

## 2025-05-14 LAB
ALBUMIN SERPL ELPH-MCNC: 4.4 G/DL
ALBUMIN SERPL ELPH-MCNC: 4.4 G/DL — SIGNIFICANT CHANGE UP (ref 3.3–5)
ALP BLD-CCNC: 93 U/L
ALP SERPL-CCNC: 92 U/L — SIGNIFICANT CHANGE UP (ref 40–120)
ALT FLD-CCNC: 15 U/L — SIGNIFICANT CHANGE UP (ref 10–40)
ALT SERPL-CCNC: 16 U/L
ANION GAP SERPL CALC-SCNC: 13 MMOL/L — SIGNIFICANT CHANGE UP (ref 5–17)
ANION GAP SERPL CALC-SCNC: 14 MMOL/L
AST SERPL-CCNC: 18 U/L — SIGNIFICANT CHANGE UP (ref 10–35)
AST SERPL-CCNC: 21 U/L
BASOPHILS # BLD AUTO: 0.03 K/UL — SIGNIFICANT CHANGE UP (ref 0–0.2)
BASOPHILS NFR BLD AUTO: 0.6 % — SIGNIFICANT CHANGE UP (ref 0–2)
BILIRUB SERPL-MCNC: 0.5 MG/DL
BILIRUB SERPL-MCNC: 0.5 MG/DL — SIGNIFICANT CHANGE UP (ref 0.2–1.2)
BUN SERPL-MCNC: 24 MG/DL
BUN SERPL-MCNC: 24 MG/DL — HIGH (ref 7–23)
CALCIUM SERPL-MCNC: 10 MG/DL
CALCIUM SERPL-MCNC: 9.8 MG/DL — SIGNIFICANT CHANGE UP (ref 8.4–10.5)
CHLORIDE SERPL-SCNC: 101 MMOL/L — SIGNIFICANT CHANGE UP (ref 96–108)
CHLORIDE SERPL-SCNC: 102 MMOL/L
CO2 SERPL-SCNC: 23 MMOL/L
CO2 SERPL-SCNC: 23 MMOL/L — SIGNIFICANT CHANGE UP (ref 22–31)
CREAT SERPL-MCNC: 1.08 MG/DL
CREAT SERPL-MCNC: 1.1 MG/DL — SIGNIFICANT CHANGE UP (ref 0.5–1.3)
EGFR: 51 ML/MIN/1.73M2 — LOW
EGFR: 51 ML/MIN/1.73M2 — LOW
EGFRCR SERPLBLD CKD-EPI 2021: 52 ML/MIN/1.73M2
EOSINOPHIL # BLD AUTO: 0.16 K/UL — SIGNIFICANT CHANGE UP (ref 0–0.5)
EOSINOPHIL NFR BLD AUTO: 3 % — SIGNIFICANT CHANGE UP (ref 0–6)
GLUCOSE SERPL-MCNC: 101 MG/DL
GLUCOSE SERPL-MCNC: 91 MG/DL — SIGNIFICANT CHANGE UP (ref 70–99)
HCT VFR BLD CALC: 35 % — SIGNIFICANT CHANGE UP (ref 34.5–45)
HGB BLD-MCNC: 11.6 G/DL — SIGNIFICANT CHANGE UP (ref 11.5–15.5)
IMM GRANULOCYTES # BLD AUTO: 0.01 K/UL — SIGNIFICANT CHANGE UP (ref 0–0.07)
IMM GRANULOCYTES NFR BLD AUTO: 0.2 % — SIGNIFICANT CHANGE UP (ref 0–0.9)
INR PPP: 0.97 RATIO
LYMPHOCYTES # BLD AUTO: 0.92 K/UL — LOW (ref 1–3.3)
LYMPHOCYTES NFR BLD AUTO: 17.5 % — SIGNIFICANT CHANGE UP (ref 13–44)
MAGNESIUM SERPL-MCNC: 1.9 MG/DL
MCHC RBC-ENTMCNC: 29.9 PG — SIGNIFICANT CHANGE UP (ref 27–34)
MCHC RBC-ENTMCNC: 33.1 G/DL — SIGNIFICANT CHANGE UP (ref 32–36)
MCV RBC AUTO: 90.2 FL — SIGNIFICANT CHANGE UP (ref 80–100)
MONOCYTES # BLD AUTO: 0.32 K/UL — SIGNIFICANT CHANGE UP (ref 0–0.9)
MONOCYTES NFR BLD AUTO: 6.1 % — SIGNIFICANT CHANGE UP (ref 2–14)
NEUTROPHILS # BLD AUTO: 3.81 K/UL — SIGNIFICANT CHANGE UP (ref 1.8–7.4)
NEUTROPHILS NFR BLD AUTO: 72.6 % — SIGNIFICANT CHANGE UP (ref 43–77)
NRBC # BLD AUTO: 0 K/UL — SIGNIFICANT CHANGE UP (ref 0–0)
NRBC # FLD: 0 K/UL — SIGNIFICANT CHANGE UP (ref 0–0)
NRBC BLD AUTO-RTO: 0 /100 WBCS — SIGNIFICANT CHANGE UP (ref 0–0)
PLATELET # BLD AUTO: 207 K/UL — SIGNIFICANT CHANGE UP (ref 150–400)
PMV BLD: 10.1 FL — SIGNIFICANT CHANGE UP (ref 7–13)
POTASSIUM SERPL-MCNC: 4.6 MMOL/L — SIGNIFICANT CHANGE UP (ref 3.5–5.3)
POTASSIUM SERPL-SCNC: 4.6 MMOL/L — SIGNIFICANT CHANGE UP (ref 3.5–5.3)
POTASSIUM SERPL-SCNC: 4.8 MMOL/L
PROT SERPL-MCNC: 7 G/DL
PROT SERPL-MCNC: 7.2 G/DL — SIGNIFICANT CHANGE UP (ref 6–8.3)
PT BLD: 11.4 SEC
RBC # BLD: 3.88 M/UL — SIGNIFICANT CHANGE UP (ref 3.8–5.2)
RBC # FLD: 12.4 % — SIGNIFICANT CHANGE UP (ref 10.3–14.5)
SODIUM SERPL-SCNC: 137 MMOL/L — SIGNIFICANT CHANGE UP (ref 135–145)
SODIUM SERPL-SCNC: 139 MMOL/L
WBC # BLD: 5.25 K/UL — SIGNIFICANT CHANGE UP (ref 3.8–10.5)
WBC # FLD AUTO: 5.25 K/UL — SIGNIFICANT CHANGE UP (ref 3.8–10.5)

## 2025-05-14 RX ORDER — ONDANSETRON 8 MG/1
8 TABLET, ORALLY DISINTEGRATING ORAL EVERY 8 HOURS
Qty: 90 | Refills: 3 | Status: ACTIVE | COMMUNITY
Start: 2025-05-14 | End: 1900-01-01

## 2025-05-14 RX ORDER — OLANZAPINE 5 MG/1
5 TABLET, FILM COATED ORAL
Qty: 30 | Refills: 0 | Status: ACTIVE | COMMUNITY
Start: 2025-05-14 | End: 1900-01-01

## 2025-05-15 DIAGNOSIS — R11.2 NAUSEA WITH VOMITING, UNSPECIFIED: ICD-10-CM

## 2025-05-15 DIAGNOSIS — Z51.11 ENCOUNTER FOR ANTINEOPLASTIC CHEMOTHERAPY: ICD-10-CM

## 2025-05-15 LAB
HAV IGM SER QL: NONREACTIVE
HBV CORE IGG+IGM SER QL: NONREACTIVE
HBV CORE IGM SER QL: NONREACTIVE
HBV SURFACE AB SER QL: REACTIVE
HBV SURFACE AG SER QL: NONREACTIVE
HCV AB SER QL: NONREACTIVE
HCV S/CO RATIO: 0.17 S/CO

## 2025-05-21 ENCOUNTER — APPOINTMENT (OUTPATIENT)
Age: 80
End: 2025-05-21

## 2025-05-21 ENCOUNTER — RESULT REVIEW (OUTPATIENT)
Age: 80
End: 2025-05-21

## 2025-05-21 LAB
ALBUMIN SERPL ELPH-MCNC: 4.2 G/DL — SIGNIFICANT CHANGE UP (ref 3.3–5)
ALP SERPL-CCNC: 90 U/L — SIGNIFICANT CHANGE UP (ref 40–120)
ALT FLD-CCNC: 27 U/L — SIGNIFICANT CHANGE UP (ref 10–40)
ANION GAP SERPL CALC-SCNC: 15 MMOL/L — SIGNIFICANT CHANGE UP (ref 5–17)
AST SERPL-CCNC: 25 U/L — SIGNIFICANT CHANGE UP (ref 10–35)
BASOPHILS # BLD AUTO: 0.02 K/UL — SIGNIFICANT CHANGE UP (ref 0–0.2)
BASOPHILS NFR BLD AUTO: 0.7 % — SIGNIFICANT CHANGE UP (ref 0–2)
BILIRUB SERPL-MCNC: 0.7 MG/DL — SIGNIFICANT CHANGE UP (ref 0.2–1.2)
BUN SERPL-MCNC: 34 MG/DL — HIGH (ref 7–23)
CALCIUM SERPL-MCNC: 9.8 MG/DL — SIGNIFICANT CHANGE UP (ref 8.4–10.5)
CHLORIDE SERPL-SCNC: 101 MMOL/L — SIGNIFICANT CHANGE UP (ref 96–108)
CO2 SERPL-SCNC: 21 MMOL/L — LOW (ref 22–31)
CREAT SERPL-MCNC: 1.45 MG/DL — HIGH (ref 0.5–1.3)
EGFR: 37 ML/MIN/1.73M2 — LOW
EGFR: 37 ML/MIN/1.73M2 — LOW
EOSINOPHIL # BLD AUTO: 0.06 K/UL — SIGNIFICANT CHANGE UP (ref 0–0.5)
EOSINOPHIL NFR BLD AUTO: 2.2 % — SIGNIFICANT CHANGE UP (ref 0–6)
GLUCOSE SERPL-MCNC: 92 MG/DL — SIGNIFICANT CHANGE UP (ref 70–99)
HCT VFR BLD CALC: 36.4 % — SIGNIFICANT CHANGE UP (ref 34.5–45)
HGB BLD-MCNC: 11.9 G/DL — SIGNIFICANT CHANGE UP (ref 11.5–15.5)
IMM GRANULOCYTES # BLD AUTO: 0.01 K/UL — SIGNIFICANT CHANGE UP (ref 0–0.07)
IMM GRANULOCYTES NFR BLD AUTO: 0.4 % — SIGNIFICANT CHANGE UP (ref 0–0.9)
LYMPHOCYTES # BLD AUTO: 1.05 K/UL — SIGNIFICANT CHANGE UP (ref 1–3.3)
LYMPHOCYTES NFR BLD AUTO: 38.3 % — SIGNIFICANT CHANGE UP (ref 13–44)
MANUAL SMEAR VERIFICATION: SIGNIFICANT CHANGE UP
MCHC RBC-ENTMCNC: 30 PG — SIGNIFICANT CHANGE UP (ref 27–34)
MCHC RBC-ENTMCNC: 32.7 G/DL — SIGNIFICANT CHANGE UP (ref 32–36)
MCV RBC AUTO: 91.7 FL — SIGNIFICANT CHANGE UP (ref 80–100)
MONOCYTES # BLD AUTO: 0.09 K/UL — SIGNIFICANT CHANGE UP (ref 0–0.9)
MONOCYTES NFR BLD AUTO: 3.3 % — SIGNIFICANT CHANGE UP (ref 2–14)
NEUTROPHILS # BLD AUTO: 1.51 K/UL — LOW (ref 1.8–7.4)
NEUTROPHILS NFR BLD AUTO: 55.1 % — SIGNIFICANT CHANGE UP (ref 43–77)
NRBC # BLD AUTO: 0 K/UL — SIGNIFICANT CHANGE UP (ref 0–0)
NRBC # FLD: 0 K/UL — SIGNIFICANT CHANGE UP (ref 0–0)
NRBC BLD AUTO-RTO: 0 /100 WBCS — SIGNIFICANT CHANGE UP (ref 0–0)
PLAT MORPH BLD: NORMAL — SIGNIFICANT CHANGE UP
PLATELET # BLD AUTO: 140 K/UL — LOW (ref 150–400)
PMV BLD: 10 FL — SIGNIFICANT CHANGE UP (ref 7–13)
POTASSIUM SERPL-MCNC: 4.9 MMOL/L — SIGNIFICANT CHANGE UP (ref 3.5–5.3)
POTASSIUM SERPL-SCNC: 4.9 MMOL/L — SIGNIFICANT CHANGE UP (ref 3.5–5.3)
PROT SERPL-MCNC: 7 G/DL — SIGNIFICANT CHANGE UP (ref 6–8.3)
RBC # BLD: 3.97 M/UL — SIGNIFICANT CHANGE UP (ref 3.8–5.2)
RBC # FLD: 11.9 % — SIGNIFICANT CHANGE UP (ref 10.3–14.5)
RBC BLD AUTO: SIGNIFICANT CHANGE UP
SODIUM SERPL-SCNC: 136 MMOL/L — SIGNIFICANT CHANGE UP (ref 135–145)
WBC # BLD: 2.74 K/UL — LOW (ref 3.8–10.5)
WBC # FLD AUTO: 2.74 K/UL — LOW (ref 3.8–10.5)

## 2025-05-28 ENCOUNTER — OFFICE (OUTPATIENT)
Dept: URBAN - METROPOLITAN AREA CLINIC 114 | Facility: CLINIC | Age: 80
Setting detail: OPHTHALMOLOGY
End: 2025-05-28
Payer: MEDICARE

## 2025-05-28 DIAGNOSIS — H01.005: ICD-10-CM

## 2025-05-28 DIAGNOSIS — Z96.1: ICD-10-CM

## 2025-05-28 DIAGNOSIS — H01.002: ICD-10-CM

## 2025-05-28 DIAGNOSIS — H34.231: ICD-10-CM

## 2025-05-28 DIAGNOSIS — H01.004: ICD-10-CM

## 2025-05-28 DIAGNOSIS — H43.391: ICD-10-CM

## 2025-05-28 DIAGNOSIS — H35.033: ICD-10-CM

## 2025-05-28 DIAGNOSIS — H01.001: ICD-10-CM

## 2025-05-28 DIAGNOSIS — H43.813: ICD-10-CM

## 2025-05-28 PROCEDURE — 92250 FUNDUS PHOTOGRAPHY W/I&R: CPT | Performed by: SPECIALIST

## 2025-05-28 PROCEDURE — 92014 COMPRE OPH EXAM EST PT 1/>: CPT | Performed by: SPECIALIST

## 2025-05-28 ASSESSMENT — TONOMETRY
OD_IOP_MMHG: 15
OS_IOP_MMHG: 15

## 2025-05-28 ASSESSMENT — VISUAL ACUITY
OS_BCVA: 20/25
OD_BCVA: 20/25

## 2025-05-28 ASSESSMENT — LID EXAM ASSESSMENTS
OD_BLEPHARITIS: RLL RUL 2+
OS_BLEPHARITIS: LLL LUL 2+

## 2025-05-28 ASSESSMENT — CONFRONTATIONAL VISUAL FIELD TEST (CVF)
OS_FINDINGS: FULL
OD_FINDINGS: FULL

## 2025-06-12 ENCOUNTER — RESULT REVIEW (OUTPATIENT)
Age: 80
End: 2025-06-12

## 2025-06-12 ENCOUNTER — APPOINTMENT (OUTPATIENT)
Dept: HEMATOLOGY ONCOLOGY | Facility: CLINIC | Age: 80
End: 2025-06-12
Payer: MEDICARE

## 2025-06-12 ENCOUNTER — APPOINTMENT (OUTPATIENT)
Age: 80
End: 2025-06-12

## 2025-06-12 ENCOUNTER — NON-APPOINTMENT (OUTPATIENT)
Age: 80
End: 2025-06-12

## 2025-06-12 VITALS
HEIGHT: 62 IN | BODY MASS INDEX: 24.26 KG/M2 | HEART RATE: 72 BPM | WEIGHT: 131.84 LBS | OXYGEN SATURATION: 99 % | SYSTOLIC BLOOD PRESSURE: 148 MMHG | DIASTOLIC BLOOD PRESSURE: 67 MMHG | TEMPERATURE: 98 F

## 2025-06-12 LAB
ANISOCYTOSIS BLD QL: SLIGHT — SIGNIFICANT CHANGE UP
BASOPHILS # BLD AUTO: 0.01 K/UL — SIGNIFICANT CHANGE UP (ref 0–0.2)
BASOPHILS # BLD MANUAL: 0 K/UL — SIGNIFICANT CHANGE UP (ref 0–0.2)
BASOPHILS NFR BLD AUTO: 0.3 % — SIGNIFICANT CHANGE UP (ref 0–2)
BASOPHILS NFR BLD MANUAL: 0 % — SIGNIFICANT CHANGE UP (ref 0–2)
EOSINOPHIL # BLD AUTO: 0.09 K/UL — SIGNIFICANT CHANGE UP (ref 0–0.5)
EOSINOPHIL # BLD MANUAL: 0.15 K/UL — SIGNIFICANT CHANGE UP (ref 0–0.5)
EOSINOPHIL NFR BLD AUTO: 3 % — SIGNIFICANT CHANGE UP (ref 0–6)
EOSINOPHIL NFR BLD MANUAL: 5 % — SIGNIFICANT CHANGE UP (ref 0–6)
HCT VFR BLD CALC: 30.9 % — LOW (ref 34.5–45)
HGB BLD-MCNC: 10.1 G/DL — LOW (ref 11.5–15.5)
IMM GRANULOCYTES # BLD AUTO: 0.01 K/UL — SIGNIFICANT CHANGE UP (ref 0–0.07)
IMM GRANULOCYTES NFR BLD AUTO: 0.3 % — SIGNIFICANT CHANGE UP (ref 0–0.9)
LG PLATELETS BLD QL AUTO: SLIGHT — SIGNIFICANT CHANGE UP
LYMPHOCYTES # BLD AUTO: 0.98 K/UL — LOW (ref 1–3.3)
LYMPHOCYTES # BLD MANUAL: 1.31 K/UL — SIGNIFICANT CHANGE UP (ref 1–3.3)
LYMPHOCYTES NFR BLD AUTO: 32.2 % — SIGNIFICANT CHANGE UP (ref 13–44)
LYMPHOCYTES NFR BLD MANUAL: 43 % — SIGNIFICANT CHANGE UP (ref 13–44)
MACROCYTES BLD QL: SLIGHT — SIGNIFICANT CHANGE UP
MANUAL SMEAR VERIFICATION: SIGNIFICANT CHANGE UP
MCHC RBC-ENTMCNC: 29.8 PG — SIGNIFICANT CHANGE UP (ref 27–34)
MCHC RBC-ENTMCNC: 32.7 G/DL — SIGNIFICANT CHANGE UP (ref 32–36)
MCV RBC AUTO: 91.2 FL — SIGNIFICANT CHANGE UP (ref 80–100)
MICROCYTES BLD QL: SLIGHT — SIGNIFICANT CHANGE UP
MONOCYTES # BLD AUTO: 0.8 K/UL — SIGNIFICANT CHANGE UP (ref 0–0.9)
MONOCYTES # BLD MANUAL: 0.7 K/UL — SIGNIFICANT CHANGE UP (ref 0–0.9)
MONOCYTES NFR BLD AUTO: 26.3 % — HIGH (ref 2–14)
MONOCYTES NFR BLD MANUAL: 23 % — HIGH (ref 2–14)
NEUTROPHILS # BLD AUTO: 1.15 K/UL — LOW (ref 1.8–7.4)
NEUTROPHILS # BLD MANUAL: 0.88 K/UL — LOW (ref 1.8–7.4)
NEUTROPHILS NFR BLD AUTO: 37.9 % — LOW (ref 43–77)
NEUTROPHILS NFR BLD MANUAL: 29 % — LOW (ref 43–77)
NRBC # BLD AUTO: 0 K/UL — SIGNIFICANT CHANGE UP (ref 0–0)
NRBC # FLD: 0 K/UL — SIGNIFICANT CHANGE UP (ref 0–0)
NRBC BLD AUTO-RTO: 0 /100 WBCS — SIGNIFICANT CHANGE UP (ref 0–0)
PLAT MORPH BLD: NORMAL — SIGNIFICANT CHANGE UP
PLATELET # BLD AUTO: 356 K/UL — SIGNIFICANT CHANGE UP (ref 150–400)
PMV BLD: 9.6 FL — SIGNIFICANT CHANGE UP (ref 7–13)
RBC # BLD: 3.39 M/UL — LOW (ref 3.8–5.2)
RBC # FLD: 15.1 % — HIGH (ref 10.3–14.5)
RBC BLD AUTO: SIGNIFICANT CHANGE UP
WBC # BLD: 3.04 K/UL — LOW (ref 3.8–10.5)
WBC # FLD AUTO: 3.04 K/UL — LOW (ref 3.8–10.5)

## 2025-06-12 PROCEDURE — 99215 OFFICE O/P EST HI 40 MIN: CPT

## 2025-06-17 ENCOUNTER — RESULT REVIEW (OUTPATIENT)
Age: 80
End: 2025-06-17

## 2025-06-17 ENCOUNTER — APPOINTMENT (OUTPATIENT)
Age: 80
End: 2025-06-17

## 2025-06-17 ENCOUNTER — APPOINTMENT (OUTPATIENT)
Dept: HEMATOLOGY ONCOLOGY | Facility: CLINIC | Age: 80
End: 2025-06-17

## 2025-06-17 LAB
BASOPHILS # BLD AUTO: 0.02 K/UL — SIGNIFICANT CHANGE UP (ref 0–0.2)
BASOPHILS NFR BLD AUTO: 0.4 % — SIGNIFICANT CHANGE UP (ref 0–2)
EOSINOPHIL # BLD AUTO: 0.06 K/UL — SIGNIFICANT CHANGE UP (ref 0–0.5)
EOSINOPHIL NFR BLD AUTO: 1.3 % — SIGNIFICANT CHANGE UP (ref 0–6)
HCT VFR BLD CALC: 33.1 % — LOW (ref 34.5–45)
HGB BLD-MCNC: 10.6 G/DL — LOW (ref 11.5–15.5)
IMM GRANULOCYTES # BLD AUTO: 0.04 K/UL — SIGNIFICANT CHANGE UP (ref 0–0.07)
IMM GRANULOCYTES NFR BLD AUTO: 0.9 % — SIGNIFICANT CHANGE UP (ref 0–0.9)
LYMPHOCYTES # BLD AUTO: 1.1 K/UL — SIGNIFICANT CHANGE UP (ref 1–3.3)
LYMPHOCYTES NFR BLD AUTO: 23.9 % — SIGNIFICANT CHANGE UP (ref 13–44)
MCHC RBC-ENTMCNC: 29.6 PG — SIGNIFICANT CHANGE UP (ref 27–34)
MCHC RBC-ENTMCNC: 32 G/DL — SIGNIFICANT CHANGE UP (ref 32–36)
MCV RBC AUTO: 92.5 FL — SIGNIFICANT CHANGE UP (ref 80–100)
MONOCYTES # BLD AUTO: 0.73 K/UL — SIGNIFICANT CHANGE UP (ref 0–0.9)
MONOCYTES NFR BLD AUTO: 15.8 % — HIGH (ref 2–14)
NEUTROPHILS # BLD AUTO: 2.66 K/UL — SIGNIFICANT CHANGE UP (ref 1.8–7.4)
NEUTROPHILS NFR BLD AUTO: 57.7 % — SIGNIFICANT CHANGE UP (ref 43–77)
NRBC # BLD AUTO: 0 K/UL — SIGNIFICANT CHANGE UP (ref 0–0)
NRBC # FLD: 0 K/UL — SIGNIFICANT CHANGE UP (ref 0–0)
NRBC BLD AUTO-RTO: 0 /100 WBCS — SIGNIFICANT CHANGE UP (ref 0–0)
PLATELET # BLD AUTO: 296 K/UL — SIGNIFICANT CHANGE UP (ref 150–400)
PMV BLD: 10.2 FL — SIGNIFICANT CHANGE UP (ref 7–13)
RBC # BLD: 3.58 M/UL — LOW (ref 3.8–5.2)
RBC # FLD: 15.5 % — HIGH (ref 10.3–14.5)
WBC # BLD: 4.61 K/UL — SIGNIFICANT CHANGE UP (ref 3.8–10.5)
WBC # FLD AUTO: 4.61 K/UL — SIGNIFICANT CHANGE UP (ref 3.8–10.5)

## 2025-06-18 LAB
ALBUMIN SERPL ELPH-MCNC: 4.2 G/DL — SIGNIFICANT CHANGE UP (ref 3.3–5)
ALP SERPL-CCNC: 88 U/L — SIGNIFICANT CHANGE UP (ref 40–120)
ALT FLD-CCNC: 24 U/L — SIGNIFICANT CHANGE UP (ref 10–40)
ANION GAP SERPL CALC-SCNC: 14 MMOL/L — SIGNIFICANT CHANGE UP (ref 5–17)
AST SERPL-CCNC: 21 U/L — SIGNIFICANT CHANGE UP (ref 10–35)
BILIRUB SERPL-MCNC: 0.5 MG/DL — SIGNIFICANT CHANGE UP (ref 0.2–1.2)
BUN SERPL-MCNC: 30 MG/DL — HIGH (ref 7–23)
CALCIUM SERPL-MCNC: 9.9 MG/DL — SIGNIFICANT CHANGE UP (ref 8.4–10.5)
CHLORIDE SERPL-SCNC: 105 MMOL/L — SIGNIFICANT CHANGE UP (ref 96–108)
CO2 SERPL-SCNC: 21 MMOL/L — LOW (ref 22–31)
CREAT SERPL-MCNC: 1.22 MG/DL — SIGNIFICANT CHANGE UP (ref 0.5–1.3)
EGFR: 45 ML/MIN/1.73M2 — LOW
EGFR: 45 ML/MIN/1.73M2 — LOW
GLUCOSE SERPL-MCNC: 86 MG/DL — SIGNIFICANT CHANGE UP (ref 70–99)
POTASSIUM SERPL-MCNC: 5.1 MMOL/L — SIGNIFICANT CHANGE UP (ref 3.5–5.3)
POTASSIUM SERPL-SCNC: 5.1 MMOL/L — SIGNIFICANT CHANGE UP (ref 3.5–5.3)
PROT SERPL-MCNC: 6.9 G/DL — SIGNIFICANT CHANGE UP (ref 6–8.3)
SODIUM SERPL-SCNC: 140 MMOL/L — SIGNIFICANT CHANGE UP (ref 135–145)

## 2025-06-23 DIAGNOSIS — C68.9 MALIGNANT NEOPLASM OF URINARY ORGAN, UNSPECIFIED: ICD-10-CM

## 2025-06-24 ENCOUNTER — RESULT REVIEW (OUTPATIENT)
Age: 80
End: 2025-06-24

## 2025-06-24 ENCOUNTER — APPOINTMENT (OUTPATIENT)
Age: 80
End: 2025-06-24

## 2025-06-24 LAB
ANISOCYTOSIS BLD QL: SLIGHT — SIGNIFICANT CHANGE UP
BASOPHILS # BLD AUTO: 0.03 K/UL — SIGNIFICANT CHANGE UP (ref 0–0.2)
BASOPHILS # BLD MANUAL: 0.09 K/UL — SIGNIFICANT CHANGE UP (ref 0–0.2)
BASOPHILS NFR BLD AUTO: 1 % — SIGNIFICANT CHANGE UP (ref 0–2)
BASOPHILS NFR BLD MANUAL: 3 % — HIGH (ref 0–2)
DACRYOCYTES BLD QL SMEAR: SLIGHT — SIGNIFICANT CHANGE UP
ELLIPTOCYTES BLD QL SMEAR: SLIGHT — SIGNIFICANT CHANGE UP
EOSINOPHIL # BLD AUTO: 0.04 K/UL — SIGNIFICANT CHANGE UP (ref 0–0.5)
EOSINOPHIL # BLD MANUAL: 0.03 K/UL — SIGNIFICANT CHANGE UP (ref 0–0.5)
EOSINOPHIL NFR BLD AUTO: 1.3 % — SIGNIFICANT CHANGE UP (ref 0–6)
EOSINOPHIL NFR BLD MANUAL: 1 % — SIGNIFICANT CHANGE UP (ref 0–6)
HCT VFR BLD CALC: 28.3 % — LOW (ref 34.5–45)
HGB BLD-MCNC: 9.6 G/DL — LOW (ref 11.5–15.5)
IMM GRANULOCYTES # BLD AUTO: 0.01 K/UL — SIGNIFICANT CHANGE UP (ref 0–0.07)
IMM GRANULOCYTES NFR BLD AUTO: 0.3 % — SIGNIFICANT CHANGE UP (ref 0–0.9)
LG PLATELETS BLD QL AUTO: SLIGHT — SIGNIFICANT CHANGE UP
LYMPHOCYTES # BLD AUTO: 1.09 K/UL — SIGNIFICANT CHANGE UP (ref 1–3.3)
LYMPHOCYTES # BLD MANUAL: 1.61 K/UL — SIGNIFICANT CHANGE UP (ref 1–3.3)
LYMPHOCYTES NFR BLD AUTO: 36.5 % — SIGNIFICANT CHANGE UP (ref 13–44)
LYMPHOCYTES NFR BLD MANUAL: 54 % — HIGH (ref 13–44)
MACROCYTES BLD QL: SLIGHT — SIGNIFICANT CHANGE UP
MANUAL SMEAR VERIFICATION: SIGNIFICANT CHANGE UP
MCHC RBC-ENTMCNC: 30.2 PG — SIGNIFICANT CHANGE UP (ref 27–34)
MCHC RBC-ENTMCNC: 33.9 G/DL — SIGNIFICANT CHANGE UP (ref 32–36)
MCV RBC AUTO: 89 FL — SIGNIFICANT CHANGE UP (ref 80–100)
MICROCYTES BLD QL: SLIGHT — SIGNIFICANT CHANGE UP
MONOCYTES # BLD AUTO: 0.19 K/UL — SIGNIFICANT CHANGE UP (ref 0–0.9)
MONOCYTES # BLD MANUAL: 0.06 K/UL — SIGNIFICANT CHANGE UP (ref 0–0.9)
MONOCYTES NFR BLD AUTO: 6.4 % — SIGNIFICANT CHANGE UP (ref 2–14)
MONOCYTES NFR BLD MANUAL: 2 % — SIGNIFICANT CHANGE UP (ref 2–14)
NEUTROPHILS # BLD AUTO: 1.63 K/UL — LOW (ref 1.8–7.4)
NEUTROPHILS # BLD MANUAL: 1.2 K/UL — LOW (ref 1.8–7.4)
NEUTROPHILS NFR BLD AUTO: 54.5 % — SIGNIFICANT CHANGE UP (ref 43–77)
NEUTROPHILS NFR BLD MANUAL: 40 % — LOW (ref 43–77)
NRBC # BLD AUTO: 0 K/UL — SIGNIFICANT CHANGE UP (ref 0–0)
NRBC # FLD: 0 K/UL — SIGNIFICANT CHANGE UP (ref 0–0)
NRBC BLD AUTO-RTO: 0 /100 WBCS — SIGNIFICANT CHANGE UP (ref 0–0)
OVALOCYTES BLD QL SMEAR: SLIGHT — SIGNIFICANT CHANGE UP
PLAT MORPH BLD: NORMAL — SIGNIFICANT CHANGE UP
PLATELET # BLD AUTO: 166 K/UL — SIGNIFICANT CHANGE UP (ref 150–400)
PMV BLD: 10.4 FL — SIGNIFICANT CHANGE UP (ref 7–13)
POLYCHROMASIA BLD QL SMEAR: SLIGHT — SIGNIFICANT CHANGE UP
RBC # BLD: 3.18 M/UL — LOW (ref 3.8–5.2)
RBC # FLD: 14.5 % — SIGNIFICANT CHANGE UP (ref 10.3–14.5)
RBC BLD AUTO: SIGNIFICANT CHANGE UP
WBC # BLD: 2.99 K/UL — LOW (ref 3.8–10.5)
WBC # FLD AUTO: 2.99 K/UL — LOW (ref 3.8–10.5)

## 2025-06-25 ENCOUNTER — OUTPATIENT (OUTPATIENT)
Dept: OUTPATIENT SERVICES | Facility: HOSPITAL | Age: 80
LOS: 1 days | Discharge: HOME | End: 2025-06-25

## 2025-06-25 DIAGNOSIS — G56.00 CARPAL TUNNEL SYNDROME, UNSPECIFIED UPPER LIMB: Chronic | ICD-10-CM

## 2025-06-25 DIAGNOSIS — Z98.890 OTHER SPECIFIED POSTPROCEDURAL STATES: Chronic | ICD-10-CM

## 2025-06-25 DIAGNOSIS — Z98.61 CORONARY ANGIOPLASTY STATUS: Chronic | ICD-10-CM

## 2025-06-25 DIAGNOSIS — Z96.82 PRESENCE OF NEUROSTIMULATOR: Chronic | ICD-10-CM

## 2025-06-25 DIAGNOSIS — Z90.49 ACQUIRED ABSENCE OF OTHER SPECIFIED PARTS OF DIGESTIVE TRACT: Chronic | ICD-10-CM

## 2025-06-25 LAB
ALBUMIN SERPL ELPH-MCNC: 4.3 G/DL — SIGNIFICANT CHANGE UP (ref 3.3–5)
ALP SERPL-CCNC: 83 U/L — SIGNIFICANT CHANGE UP (ref 40–120)
ALT FLD-CCNC: 21 U/L — SIGNIFICANT CHANGE UP (ref 10–40)
ANION GAP SERPL CALC-SCNC: 11 MMOL/L — SIGNIFICANT CHANGE UP (ref 5–17)
AST SERPL-CCNC: 30 U/L — SIGNIFICANT CHANGE UP (ref 10–35)
BILIRUB SERPL-MCNC: 0.3 MG/DL — SIGNIFICANT CHANGE UP (ref 0.2–1.2)
BUN SERPL-MCNC: 36 MG/DL — HIGH (ref 7–23)
CALCIUM SERPL-MCNC: 9.3 MG/DL — SIGNIFICANT CHANGE UP (ref 8.4–10.5)
CHLORIDE SERPL-SCNC: 104 MMOL/L — SIGNIFICANT CHANGE UP (ref 96–108)
CO2 SERPL-SCNC: 23 MMOL/L — SIGNIFICANT CHANGE UP (ref 22–31)
CREAT SERPL-MCNC: 1.61 MG/DL — HIGH (ref 0.5–1.3)
EGFR: 32 ML/MIN/1.73M2 — LOW
EGFR: 32 ML/MIN/1.73M2 — LOW
GLUCOSE SERPL-MCNC: 96 MG/DL — SIGNIFICANT CHANGE UP (ref 70–99)
POTASSIUM SERPL-MCNC: 5.4 MMOL/L — HIGH (ref 3.5–5.3)
POTASSIUM SERPL-SCNC: 5.4 MMOL/L — HIGH (ref 3.5–5.3)
PROT SERPL-MCNC: 7.2 G/DL — SIGNIFICANT CHANGE UP (ref 6–8.3)
SODIUM SERPL-SCNC: 138 MMOL/L — SIGNIFICANT CHANGE UP (ref 135–145)

## 2025-06-30 DIAGNOSIS — R11.2 NAUSEA WITH VOMITING, UNSPECIFIED: ICD-10-CM

## 2025-06-30 DIAGNOSIS — Z51.89 ENCOUNTER FOR OTHER SPECIFIED AFTERCARE: ICD-10-CM

## 2025-07-03 ENCOUNTER — APPOINTMENT (OUTPATIENT)
Dept: HEMATOLOGY ONCOLOGY | Facility: CLINIC | Age: 80
End: 2025-07-03

## 2025-07-07 ENCOUNTER — RESULT REVIEW (OUTPATIENT)
Age: 80
End: 2025-07-07

## 2025-07-07 ENCOUNTER — OUTPATIENT (OUTPATIENT)
Dept: OUTPATIENT SERVICES | Facility: HOSPITAL | Age: 80
LOS: 1 days | End: 2025-07-07
Payer: MEDICARE

## 2025-07-07 ENCOUNTER — APPOINTMENT (OUTPATIENT)
Dept: HEMATOLOGY ONCOLOGY | Facility: CLINIC | Age: 80
End: 2025-07-07
Payer: MEDICARE

## 2025-07-07 VITALS
TEMPERATURE: 96.7 F | WEIGHT: 132.16 LBS | SYSTOLIC BLOOD PRESSURE: 124 MMHG | DIASTOLIC BLOOD PRESSURE: 63 MMHG | OXYGEN SATURATION: 95 % | HEART RATE: 82 BPM | HEIGHT: 62 IN | BODY MASS INDEX: 24.32 KG/M2

## 2025-07-07 DIAGNOSIS — Z98.890 OTHER SPECIFIED POSTPROCEDURAL STATES: Chronic | ICD-10-CM

## 2025-07-07 DIAGNOSIS — Z96.82 PRESENCE OF NEUROSTIMULATOR: Chronic | ICD-10-CM

## 2025-07-07 DIAGNOSIS — C68.9 MALIGNANT NEOPLASM OF URINARY ORGAN, UNSPECIFIED: ICD-10-CM

## 2025-07-07 LAB
ALBUMIN SERPL ELPH-MCNC: 4.1 G/DL
ALP BLD-CCNC: 164 U/L
ALT SERPL-CCNC: 31 U/L
ANION GAP SERPL CALC-SCNC: 14 MMOL/L
ANISOCYTOSIS BLD QL: SLIGHT — SIGNIFICANT CHANGE UP
AST SERPL-CCNC: 26 U/L
BASOPHILS # BLD AUTO: 0.07 K/UL — SIGNIFICANT CHANGE UP (ref 0–0.2)
BASOPHILS # BLD MANUAL: 0.23 K/UL — HIGH (ref 0–0.2)
BASOPHILS NFR BLD AUTO: 0.3 % — SIGNIFICANT CHANGE UP (ref 0–2)
BASOPHILS NFR BLD MANUAL: 1 % — SIGNIFICANT CHANGE UP (ref 0–2)
BILIRUB SERPL-MCNC: 0.4 MG/DL
BLD GP AB SCN SERPL QL: SIGNIFICANT CHANGE UP
BUN SERPL-MCNC: 26 MG/DL
CALCIUM SERPL-MCNC: 9 MG/DL
CHLORIDE SERPL-SCNC: 105 MMOL/L
CO2 SERPL-SCNC: 22 MMOL/L
CREAT SERPL-MCNC: 1.28 MG/DL
DACRYOCYTES BLD QL SMEAR: SLIGHT — SIGNIFICANT CHANGE UP
EGFRCR SERPLBLD CKD-EPI 2021: 43 ML/MIN/1.73M2
ELLIPTOCYTES BLD QL SMEAR: SLIGHT — SIGNIFICANT CHANGE UP
EOSINOPHIL # BLD AUTO: 0.15 K/UL — SIGNIFICANT CHANGE UP (ref 0–0.5)
EOSINOPHIL # BLD MANUAL: 0.23 K/UL — SIGNIFICANT CHANGE UP (ref 0–0.5)
EOSINOPHIL NFR BLD AUTO: 0.7 % — SIGNIFICANT CHANGE UP (ref 0–6)
EOSINOPHIL NFR BLD MANUAL: 1 % — SIGNIFICANT CHANGE UP (ref 0–6)
GLUCOSE SERPL-MCNC: 88 MG/DL
HCT VFR BLD CALC: 25.6 % — LOW (ref 34.5–45)
HGB BLD-MCNC: 8.5 G/DL — LOW (ref 11.5–15.5)
IMM GRANULOCYTES # BLD AUTO: 1.5 K/UL — HIGH (ref 0–0.07)
IMM GRANULOCYTES NFR BLD AUTO: 6.6 % — HIGH (ref 0–0.9)
LG PLATELETS BLD QL AUTO: SLIGHT — SIGNIFICANT CHANGE UP
LYMPHOCYTES # BLD AUTO: 1.59 K/UL — SIGNIFICANT CHANGE UP (ref 1–3.3)
LYMPHOCYTES # BLD MANUAL: 0.9 K/UL — LOW (ref 1–3.3)
LYMPHOCYTES NFR BLD AUTO: 7 % — LOW (ref 13–44)
LYMPHOCYTES NFR BLD MANUAL: 4 % — LOW (ref 13–44)
MACROCYTES BLD QL: SLIGHT — SIGNIFICANT CHANGE UP
MAGNESIUM SERPL-MCNC: 1.6 MG/DL
MANUAL METAMYELOCYTE #: 0.45 K/UL — HIGH (ref 0–0)
MANUAL MYELOCYTE #: 0.23 K/UL — HIGH (ref 0–0)
MANUAL NEUTROPHIL BANDS #: 0.23 K/UL — SIGNIFICANT CHANGE UP (ref 0–0.84)
MANUAL SMEAR VERIFICATION: SIGNIFICANT CHANGE UP
MCHC RBC-ENTMCNC: 30.6 PG — SIGNIFICANT CHANGE UP (ref 27–34)
MCHC RBC-ENTMCNC: 33.2 G/DL — SIGNIFICANT CHANGE UP (ref 32–36)
MCV RBC AUTO: 92.1 FL — SIGNIFICANT CHANGE UP (ref 80–100)
METAMYELOCYTES # FLD: 2 % — HIGH (ref 0–0)
METAMYELOCYTES NFR BLD: 2 % — HIGH (ref 0–0)
MICROCYTES BLD QL: SLIGHT — SIGNIFICANT CHANGE UP
MONOCYTES # BLD AUTO: 1.23 K/UL — HIGH (ref 0–0.9)
MONOCYTES # BLD MANUAL: 0.45 K/UL — SIGNIFICANT CHANGE UP (ref 0–0.9)
MONOCYTES NFR BLD AUTO: 5.4 % — SIGNIFICANT CHANGE UP (ref 2–14)
MONOCYTES NFR BLD MANUAL: 2 % — SIGNIFICANT CHANGE UP (ref 2–14)
MYELOCYTES NFR BLD: 1 % — HIGH (ref 0–0)
NEUTROPHILS # BLD AUTO: 18.06 K/UL — HIGH (ref 1.8–7.4)
NEUTROPHILS # BLD MANUAL: 19.89 K/UL — HIGH (ref 1.8–7.4)
NEUTROPHILS NFR BLD AUTO: 80 % — HIGH (ref 43–77)
NEUTROPHILS NFR BLD MANUAL: 88 % — HIGH (ref 43–77)
NEUTS BAND # BLD: 1 % — SIGNIFICANT CHANGE UP (ref 0–8)
NEUTS BAND NFR BLD: 1 % — SIGNIFICANT CHANGE UP (ref 0–8)
NRBC # BLD AUTO: 0.03 K/UL — HIGH (ref 0–0)
NRBC # FLD: 0.03 K/UL — HIGH (ref 0–0)
NRBC BLD AUTO-RTO: 0 /100 WBCS — SIGNIFICANT CHANGE UP (ref 0–0)
OVALOCYTES BLD QL SMEAR: SLIGHT — SIGNIFICANT CHANGE UP
PLAT MORPH BLD: NORMAL — SIGNIFICANT CHANGE UP
PLATELET # BLD AUTO: 164 K/UL — SIGNIFICANT CHANGE UP (ref 150–400)
PMV BLD: 9.9 FL — SIGNIFICANT CHANGE UP (ref 7–13)
POLYCHROMASIA BLD QL SMEAR: SLIGHT — SIGNIFICANT CHANGE UP
POTASSIUM SERPL-SCNC: 4.9 MMOL/L
PROT SERPL-MCNC: 6.8 G/DL
RBC # BLD: 2.78 M/UL — LOW (ref 3.8–5.2)
RBC # FLD: 15.9 % — HIGH (ref 10.3–14.5)
RBC BLD AUTO: SIGNIFICANT CHANGE UP
SODIUM SERPL-SCNC: 140 MMOL/L
WBC # BLD: 22.6 K/UL — HIGH (ref 3.8–10.5)
WBC # FLD AUTO: 22.6 K/UL — HIGH (ref 3.8–10.5)

## 2025-07-07 PROCEDURE — 86900 BLOOD TYPING SEROLOGIC ABO: CPT

## 2025-07-07 PROCEDURE — 86923 COMPATIBILITY TEST ELECTRIC: CPT

## 2025-07-07 PROCEDURE — G2211 COMPLEX E/M VISIT ADD ON: CPT

## 2025-07-07 PROCEDURE — 99215 OFFICE O/P EST HI 40 MIN: CPT

## 2025-07-07 PROCEDURE — 36415 COLL VENOUS BLD VENIPUNCTURE: CPT

## 2025-07-07 PROCEDURE — 86850 RBC ANTIBODY SCREEN: CPT

## 2025-07-07 PROCEDURE — 86901 BLOOD TYPING SEROLOGIC RH(D): CPT

## 2025-07-08 ENCOUNTER — APPOINTMENT (OUTPATIENT)
Dept: HEMATOLOGY ONCOLOGY | Facility: CLINIC | Age: 80
End: 2025-07-08

## 2025-07-08 ENCOUNTER — APPOINTMENT (OUTPATIENT)
Age: 80
End: 2025-07-08

## 2025-07-08 ENCOUNTER — RESULT REVIEW (OUTPATIENT)
Age: 80
End: 2025-07-08

## 2025-07-08 LAB
ALBUMIN SERPL ELPH-MCNC: 4.2 G/DL — SIGNIFICANT CHANGE UP (ref 3.3–5)
ALP SERPL-CCNC: 171 U/L — HIGH (ref 40–120)
ALT FLD-CCNC: 31 U/L — SIGNIFICANT CHANGE UP (ref 10–40)
ANION GAP SERPL CALC-SCNC: 14 MMOL/L — SIGNIFICANT CHANGE UP (ref 5–17)
AST SERPL-CCNC: 33 U/L — SIGNIFICANT CHANGE UP (ref 10–35)
BILIRUB SERPL-MCNC: 0.4 MG/DL — SIGNIFICANT CHANGE UP (ref 0.2–1.2)
BUN SERPL-MCNC: 25 MG/DL — HIGH (ref 7–23)
CALCIUM SERPL-MCNC: 9.7 MG/DL — SIGNIFICANT CHANGE UP (ref 8.4–10.5)
CHLORIDE SERPL-SCNC: 103 MMOL/L — SIGNIFICANT CHANGE UP (ref 96–108)
CO2 SERPL-SCNC: 22 MMOL/L — SIGNIFICANT CHANGE UP (ref 22–31)
CREAT SERPL-MCNC: 1.14 MG/DL — SIGNIFICANT CHANGE UP (ref 0.5–1.3)
EGFR: 49 ML/MIN/1.73M2 — LOW
EGFR: 49 ML/MIN/1.73M2 — LOW
GLUCOSE SERPL-MCNC: 79 MG/DL — SIGNIFICANT CHANGE UP (ref 70–99)
POTASSIUM SERPL-MCNC: 4.7 MMOL/L — SIGNIFICANT CHANGE UP (ref 3.5–5.3)
POTASSIUM SERPL-SCNC: 4.7 MMOL/L — SIGNIFICANT CHANGE UP (ref 3.5–5.3)
PROT SERPL-MCNC: 7.3 G/DL — SIGNIFICANT CHANGE UP (ref 6–8.3)
SODIUM SERPL-SCNC: 139 MMOL/L — SIGNIFICANT CHANGE UP (ref 135–145)

## 2025-07-08 PROCEDURE — 86901 BLOOD TYPING SEROLOGIC RH(D): CPT

## 2025-07-08 PROCEDURE — 86900 BLOOD TYPING SEROLOGIC ABO: CPT

## 2025-07-08 PROCEDURE — 86850 RBC ANTIBODY SCREEN: CPT

## 2025-07-08 PROCEDURE — 86923 COMPATIBILITY TEST ELECTRIC: CPT

## 2025-07-08 PROCEDURE — P9040: CPT

## 2025-07-08 PROCEDURE — 36415 COLL VENOUS BLD VENIPUNCTURE: CPT

## 2025-07-09 DIAGNOSIS — Z51.11 ENCOUNTER FOR ANTINEOPLASTIC CHEMOTHERAPY: ICD-10-CM

## 2025-07-14 ENCOUNTER — APPOINTMENT (OUTPATIENT)
Dept: HEMATOLOGY ONCOLOGY | Facility: CLINIC | Age: 80
End: 2025-07-14

## 2025-07-14 ENCOUNTER — RESULT REVIEW (OUTPATIENT)
Age: 80
End: 2025-07-14

## 2025-07-14 LAB
ALBUMIN SERPL ELPH-MCNC: 4 G/DL
ALP BLD-CCNC: 113 U/L
ALT SERPL-CCNC: 31 U/L
ANION GAP SERPL CALC-SCNC: 14 MMOL/L
AST SERPL-CCNC: 26 U/L
BASOPHILS # BLD AUTO: 0.02 K/UL — SIGNIFICANT CHANGE UP (ref 0–0.2)
BASOPHILS NFR BLD AUTO: 0.5 % — SIGNIFICANT CHANGE UP (ref 0–2)
BILIRUB SERPL-MCNC: 0.8 MG/DL
BUN SERPL-MCNC: 38 MG/DL
CALCIUM SERPL-MCNC: 9 MG/DL
CHLORIDE SERPL-SCNC: 100 MMOL/L
CO2 SERPL-SCNC: 20 MMOL/L
CREAT SERPL-MCNC: 1.3 MG/DL
EGFRCR SERPLBLD CKD-EPI 2021: 42 ML/MIN/1.73M2
EOSINOPHIL # BLD AUTO: 0.05 K/UL — SIGNIFICANT CHANGE UP (ref 0–0.5)
EOSINOPHIL NFR BLD AUTO: 1.1 % — SIGNIFICANT CHANGE UP (ref 0–6)
GLUCOSE SERPL-MCNC: 105 MG/DL
HCT VFR BLD CALC: 26 % — LOW (ref 34.5–45)
HGB BLD-MCNC: 8.7 G/DL — LOW (ref 11.5–15.5)
IMM GRANULOCYTES # BLD AUTO: 0.02 K/UL — SIGNIFICANT CHANGE UP (ref 0–0.07)
IMM GRANULOCYTES NFR BLD AUTO: 0.5 % — SIGNIFICANT CHANGE UP (ref 0–0.9)
LYMPHOCYTES # BLD AUTO: 0.52 K/UL — LOW (ref 1–3.3)
LYMPHOCYTES NFR BLD AUTO: 11.9 % — LOW (ref 13–44)
MAGNESIUM SERPL-MCNC: 1.5 MG/DL
MCHC RBC-ENTMCNC: 29.8 PG — SIGNIFICANT CHANGE UP (ref 27–34)
MCHC RBC-ENTMCNC: 33.5 G/DL — SIGNIFICANT CHANGE UP (ref 32–36)
MCV RBC AUTO: 89 FL — SIGNIFICANT CHANGE UP (ref 80–100)
MONOCYTES # BLD AUTO: 0.12 K/UL — SIGNIFICANT CHANGE UP (ref 0–0.9)
MONOCYTES NFR BLD AUTO: 2.7 % — SIGNIFICANT CHANGE UP (ref 2–14)
NEUTROPHILS # BLD AUTO: 3.64 K/UL — SIGNIFICANT CHANGE UP (ref 1.8–7.4)
NEUTROPHILS NFR BLD AUTO: 83.3 % — HIGH (ref 43–77)
NRBC # BLD AUTO: 0 K/UL — SIGNIFICANT CHANGE UP (ref 0–0)
NRBC # FLD: 0 K/UL — SIGNIFICANT CHANGE UP (ref 0–0)
NRBC BLD AUTO-RTO: 0 /100 WBCS — SIGNIFICANT CHANGE UP (ref 0–0)
PLATELET # BLD AUTO: 157 K/UL — SIGNIFICANT CHANGE UP (ref 150–400)
PMV BLD: 9.1 FL — SIGNIFICANT CHANGE UP (ref 7–13)
POTASSIUM SERPL-SCNC: 5 MMOL/L
PROT SERPL-MCNC: 6.5 G/DL
RBC # BLD: 2.92 M/UL — LOW (ref 3.8–5.2)
RBC # FLD: 14.8 % — HIGH (ref 10.3–14.5)
SODIUM SERPL-SCNC: 133 MMOL/L
WBC # BLD: 4.17 K/UL — SIGNIFICANT CHANGE UP (ref 3.8–10.5)
WBC # FLD AUTO: 4.17 K/UL — SIGNIFICANT CHANGE UP (ref 3.8–10.5)

## 2025-07-15 ENCOUNTER — APPOINTMENT (OUTPATIENT)
Age: 80
End: 2025-07-15

## 2025-07-28 ENCOUNTER — RESULT REVIEW (OUTPATIENT)
Age: 80
End: 2025-07-28

## 2025-07-28 ENCOUNTER — EMERGENCY (EMERGENCY)
Facility: HOSPITAL | Age: 80
LOS: 1 days | End: 2025-07-28
Attending: EMERGENCY MEDICINE
Payer: MEDICARE

## 2025-07-28 ENCOUNTER — APPOINTMENT (OUTPATIENT)
Dept: HEMATOLOGY ONCOLOGY | Facility: CLINIC | Age: 80
End: 2025-07-28

## 2025-07-28 VITALS
SYSTOLIC BLOOD PRESSURE: 160 MMHG | HEIGHT: 62 IN | TEMPERATURE: 98 F | DIASTOLIC BLOOD PRESSURE: 70 MMHG | HEART RATE: 74 BPM | WEIGHT: 130.07 LBS | RESPIRATION RATE: 18 BRPM

## 2025-07-28 DIAGNOSIS — Z98.890 OTHER SPECIFIED POSTPROCEDURAL STATES: Chronic | ICD-10-CM

## 2025-07-28 DIAGNOSIS — Z96.82 PRESENCE OF NEUROSTIMULATOR: Chronic | ICD-10-CM

## 2025-07-28 DIAGNOSIS — G56.00 CARPAL TUNNEL SYNDROME, UNSPECIFIED UPPER LIMB: Chronic | ICD-10-CM

## 2025-07-28 DIAGNOSIS — Z98.61 CORONARY ANGIOPLASTY STATUS: Chronic | ICD-10-CM

## 2025-07-28 DIAGNOSIS — Z90.49 ACQUIRED ABSENCE OF OTHER SPECIFIED PARTS OF DIGESTIVE TRACT: Chronic | ICD-10-CM

## 2025-07-28 LAB
ALBUMIN SERPL ELPH-MCNC: 3.9 G/DL — SIGNIFICANT CHANGE UP (ref 3.3–5.2)
ALP SERPL-CCNC: 226 U/L — HIGH (ref 40–120)
ALT FLD-CCNC: 67 U/L — HIGH
ANION GAP SERPL CALC-SCNC: 15 MMOL/L — SIGNIFICANT CHANGE UP (ref 5–17)
ANISOCYTOSIS BLD QL: SLIGHT — SIGNIFICANT CHANGE UP
APTT BLD: 31.8 SEC — SIGNIFICANT CHANGE UP (ref 26.1–36.8)
AST SERPL-CCNC: 37 U/L — HIGH
BASOPHILS # BLD AUTO: 0.08 K/UL — SIGNIFICANT CHANGE UP (ref 0–0.2)
BASOPHILS # BLD AUTO: 0.08 K/UL — SIGNIFICANT CHANGE UP (ref 0–0.2)
BASOPHILS # BLD MANUAL: 0 K/UL — SIGNIFICANT CHANGE UP (ref 0–0.2)
BASOPHILS # BLD MANUAL: 0.29 K/UL — HIGH (ref 0–0.2)
BASOPHILS NFR BLD AUTO: 0.2 % — SIGNIFICANT CHANGE UP (ref 0–2)
BASOPHILS NFR BLD AUTO: 0.3 % — SIGNIFICANT CHANGE UP (ref 0–2)
BASOPHILS NFR BLD MANUAL: 0 % — SIGNIFICANT CHANGE UP (ref 0–2)
BASOPHILS NFR BLD MANUAL: 1 % — SIGNIFICANT CHANGE UP (ref 0–2)
BILIRUB SERPL-MCNC: 0.6 MG/DL — SIGNIFICANT CHANGE UP (ref 0.4–2)
BLD GP AB SCN SERPL QL: SIGNIFICANT CHANGE UP
BUN SERPL-MCNC: 28 MG/DL — HIGH (ref 8–20)
CALCIUM SERPL-MCNC: 9.2 MG/DL — SIGNIFICANT CHANGE UP (ref 8.4–10.5)
CHLORIDE SERPL-SCNC: 102 MMOL/L — SIGNIFICANT CHANGE UP (ref 96–108)
CO2 SERPL-SCNC: 19 MMOL/L — LOW (ref 22–29)
CREAT SERPL-MCNC: 1.46 MG/DL — HIGH (ref 0.5–1.3)
EGFR: 36 ML/MIN/1.73M2 — LOW
EGFR: 36 ML/MIN/1.73M2 — LOW
ELLIPTOCYTES BLD QL SMEAR: SLIGHT — SIGNIFICANT CHANGE UP
EOSINOPHIL # BLD AUTO: 0.03 K/UL — SIGNIFICANT CHANGE UP (ref 0–0.5)
EOSINOPHIL # BLD AUTO: 0.04 K/UL — SIGNIFICANT CHANGE UP (ref 0–0.5)
EOSINOPHIL # BLD MANUAL: 0 K/UL — SIGNIFICANT CHANGE UP (ref 0–0.5)
EOSINOPHIL # BLD MANUAL: 0 K/UL — SIGNIFICANT CHANGE UP (ref 0–0.5)
EOSINOPHIL NFR BLD AUTO: 0.1 % — SIGNIFICANT CHANGE UP (ref 0–6)
EOSINOPHIL NFR BLD AUTO: 0.1 % — SIGNIFICANT CHANGE UP (ref 0–6)
EOSINOPHIL NFR BLD MANUAL: 0 % — SIGNIFICANT CHANGE UP (ref 0–6)
EOSINOPHIL NFR BLD MANUAL: 0 % — SIGNIFICANT CHANGE UP (ref 0–6)
GIANT PLATELETS BLD QL SMEAR: PRESENT
GLUCOSE SERPL-MCNC: 111 MG/DL — HIGH (ref 70–99)
HCT VFR BLD CALC: 19.7 % — CRITICAL LOW (ref 34.5–45)
HCT VFR BLD CALC: 21.1 % — LOW (ref 34.5–45)
HGB BLD-MCNC: 6.4 G/DL — CRITICAL LOW (ref 11.5–15.5)
HGB BLD-MCNC: 6.9 G/DL — CRITICAL LOW (ref 11.5–15.5)
IMM GRANULOCYTES # BLD AUTO: 2.44 K/UL — HIGH (ref 0–0.07)
IMM GRANULOCYTES # BLD AUTO: 2.7 K/UL — HIGH (ref 0–0.07)
IMM GRANULOCYTES NFR BLD AUTO: 7.6 % — HIGH (ref 0–0.9)
IMM GRANULOCYTES NFR BLD AUTO: 8.5 % — HIGH (ref 0–0.9)
INR BLD: 1.21 RATIO — HIGH (ref 0.85–1.16)
LG PLATELETS BLD QL AUTO: SLIGHT — SIGNIFICANT CHANGE UP
LYMPHOCYTES # BLD AUTO: 1.1 K/UL — SIGNIFICANT CHANGE UP (ref 1–3.3)
LYMPHOCYTES # BLD AUTO: 1.29 K/UL — SIGNIFICANT CHANGE UP (ref 1–3.3)
LYMPHOCYTES # BLD MANUAL: 0.6 K/UL — LOW (ref 1–3.3)
LYMPHOCYTES # BLD MANUAL: 2.3 K/UL — SIGNIFICANT CHANGE UP (ref 1–3.3)
LYMPHOCYTES NFR BLD AUTO: 3.6 % — LOW (ref 13–44)
LYMPHOCYTES NFR BLD AUTO: 3.8 % — LOW (ref 13–44)
LYMPHOCYTES NFR BLD MANUAL: 1.7 % — LOW (ref 13–44)
LYMPHOCYTES NFR BLD MANUAL: 8 % — LOW (ref 13–44)
MACROCYTES BLD QL: SLIGHT — SIGNIFICANT CHANGE UP
MANUAL METAMYELOCYTE #: 0.29 K/UL — HIGH (ref 0–0)
MANUAL METAMYELOCYTE #: 0.32 K/UL — HIGH (ref 0–0)
MANUAL MYELOCYTE #: 0.29 K/UL — HIGH (ref 0–0)
MANUAL MYELOCYTE #: 0.32 K/UL — HIGH (ref 0–0)
MANUAL REACTIVE LYMPHOCYTES #: 0.32 K/UL — SIGNIFICANT CHANGE UP (ref 0–0.63)
MANUAL SMEAR VERIFICATION: SIGNIFICANT CHANGE UP
MCHC RBC-ENTMCNC: 29.9 PG — SIGNIFICANT CHANGE UP (ref 27–34)
MCHC RBC-ENTMCNC: 30.8 PG — SIGNIFICANT CHANGE UP (ref 27–34)
MCHC RBC-ENTMCNC: 32.5 G/DL — SIGNIFICANT CHANGE UP (ref 32–36)
MCHC RBC-ENTMCNC: 32.5 G/DL — SIGNIFICANT CHANGE UP (ref 32–36)
MCV RBC AUTO: 92.1 FL — SIGNIFICANT CHANGE UP (ref 80–100)
MCV RBC AUTO: 94.6 FL — SIGNIFICANT CHANGE UP (ref 80–100)
METAMYELOCYTES # FLD: 0.9 % — HIGH (ref 0–0)
METAMYELOCYTES # FLD: 1 % — HIGH (ref 0–0)
METAMYELOCYTES NFR BLD: 0.9 % — HIGH (ref 0–0)
METAMYELOCYTES NFR BLD: 1 % — HIGH (ref 0–0)
MICROCYTES BLD QL: SLIGHT — SIGNIFICANT CHANGE UP
MONOCYTES # BLD AUTO: 0.88 K/UL — SIGNIFICANT CHANGE UP (ref 0–0.9)
MONOCYTES # BLD AUTO: 1.11 K/UL — HIGH (ref 0–0.9)
MONOCYTES # BLD MANUAL: 0 K/UL — SIGNIFICANT CHANGE UP (ref 0–0.9)
MONOCYTES # BLD MANUAL: 0.29 K/UL — SIGNIFICANT CHANGE UP (ref 0–0.9)
MONOCYTES NFR BLD AUTO: 3.1 % — SIGNIFICANT CHANGE UP (ref 2–14)
MONOCYTES NFR BLD AUTO: 3.1 % — SIGNIFICANT CHANGE UP (ref 2–14)
MONOCYTES NFR BLD MANUAL: 0 % — LOW (ref 2–14)
MONOCYTES NFR BLD MANUAL: 1 % — LOW (ref 2–14)
MYELOCYTES NFR BLD: 0.9 % — HIGH (ref 0–0)
MYELOCYTES NFR BLD: 1 % — HIGH (ref 0–0)
NEUTROPHILS # BLD AUTO: 24.19 K/UL — HIGH (ref 1.8–7.4)
NEUTROPHILS # BLD AUTO: 30.37 K/UL — HIGH (ref 1.8–7.4)
NEUTROPHILS # BLD MANUAL: 25.31 K/UL — HIGH (ref 1.8–7.4)
NEUTROPHILS # BLD MANUAL: 34.01 K/UL — HIGH (ref 1.8–7.4)
NEUTROPHILS NFR BLD AUTO: 84.2 % — HIGH (ref 43–77)
NEUTROPHILS NFR BLD AUTO: 85.4 % — HIGH (ref 43–77)
NEUTROPHILS NFR BLD MANUAL: 88 % — HIGH (ref 43–77)
NEUTROPHILS NFR BLD MANUAL: 95.6 % — HIGH (ref 43–77)
NRBC # BLD AUTO: 0.03 K/UL — HIGH (ref 0–0)
NRBC # BLD AUTO: 0.04 K/UL — HIGH (ref 0–0)
NRBC # FLD: 0.03 K/UL — HIGH (ref 0–0)
NRBC # FLD: 0.04 K/UL — HIGH (ref 0–0)
NRBC BLD AUTO-RTO: 0 /100 WBCS — SIGNIFICANT CHANGE UP (ref 0–0)
NRBC BLD AUTO-RTO: 0 /100 WBCS — SIGNIFICANT CHANGE UP (ref 0–0)
OVALOCYTES BLD QL SMEAR: SLIGHT — SIGNIFICANT CHANGE UP
PLAT MORPH BLD: NORMAL — SIGNIFICANT CHANGE UP
PLAT MORPH BLD: NORMAL — SIGNIFICANT CHANGE UP
PLATELET # BLD AUTO: 235 K/UL — SIGNIFICANT CHANGE UP (ref 150–400)
PLATELET # BLD AUTO: 293 K/UL — SIGNIFICANT CHANGE UP (ref 150–400)
PMV BLD: 10.6 FL — SIGNIFICANT CHANGE UP (ref 7–13)
PMV BLD: 10.7 FL — SIGNIFICANT CHANGE UP (ref 7–13)
POTASSIUM SERPL-MCNC: 5.4 MMOL/L — HIGH (ref 3.5–5.3)
POTASSIUM SERPL-SCNC: 5.4 MMOL/L — HIGH (ref 3.5–5.3)
PROT SERPL-MCNC: 7.1 G/DL — SIGNIFICANT CHANGE UP (ref 6.6–8.7)
PROTHROM AB SERPL-ACNC: 13.6 SEC — HIGH (ref 9.9–13.4)
RBC # BLD: 2.14 M/UL — LOW (ref 3.8–5.2)
RBC # BLD: 2.24 M/UL — LOW (ref 3.8–5.2)
RBC # FLD: 17.8 % — HIGH (ref 10.3–14.5)
RBC # FLD: 17.8 % — HIGH (ref 10.3–14.5)
RBC BLD AUTO: NORMAL — SIGNIFICANT CHANGE UP
RBC BLD AUTO: SIGNIFICANT CHANGE UP
SODIUM SERPL-SCNC: 136 MMOL/L — SIGNIFICANT CHANGE UP (ref 135–145)
VARIANT LYMPHS # BLD: 0.9 % — SIGNIFICANT CHANGE UP (ref 0–6)
VARIANT LYMPHS NFR BLD MANUAL: 0.9 % — SIGNIFICANT CHANGE UP (ref 0–6)
WBC # BLD: 28.76 K/UL — HIGH (ref 3.8–10.5)
WBC # BLD: 35.58 K/UL — HIGH (ref 3.8–10.5)
WBC # FLD AUTO: 28.76 K/UL — HIGH (ref 3.8–10.5)
WBC # FLD AUTO: 35.58 K/UL — HIGH (ref 3.8–10.5)

## 2025-07-28 PROCEDURE — 80053 COMPREHEN METABOLIC PANEL: CPT

## 2025-07-28 PROCEDURE — 86901 BLOOD TYPING SEROLOGIC RH(D): CPT

## 2025-07-28 PROCEDURE — 99285 EMERGENCY DEPT VISIT HI MDM: CPT | Mod: 25

## 2025-07-28 PROCEDURE — 93005 ELECTROCARDIOGRAM TRACING: CPT

## 2025-07-28 PROCEDURE — 99285 EMERGENCY DEPT VISIT HI MDM: CPT

## 2025-07-28 PROCEDURE — P9016: CPT

## 2025-07-28 PROCEDURE — 85025 COMPLETE CBC W/AUTO DIFF WBC: CPT

## 2025-07-28 PROCEDURE — 86923 COMPATIBILITY TEST ELECTRIC: CPT

## 2025-07-28 PROCEDURE — 86850 RBC ANTIBODY SCREEN: CPT

## 2025-07-28 PROCEDURE — 86900 BLOOD TYPING SEROLOGIC ABO: CPT

## 2025-07-28 PROCEDURE — 85610 PROTHROMBIN TIME: CPT

## 2025-07-28 PROCEDURE — 85730 THROMBOPLASTIN TIME PARTIAL: CPT

## 2025-07-28 PROCEDURE — 36415 COLL VENOUS BLD VENIPUNCTURE: CPT

## 2025-07-28 PROCEDURE — 93010 ELECTROCARDIOGRAM REPORT: CPT

## 2025-07-28 PROCEDURE — 36430 TRANSFUSION BLD/BLD COMPNT: CPT

## 2025-07-28 RX ORDER — LOSARTAN POTASSIUM 100 MG/1
50 TABLET, FILM COATED ORAL ONCE
Refills: 0 | Status: COMPLETED | OUTPATIENT
Start: 2025-07-28 | End: 2025-07-28

## 2025-07-28 RX ADMIN — LOSARTAN POTASSIUM 50 MILLIGRAM(S): 100 TABLET, FILM COATED ORAL at 22:30

## 2025-07-29 ENCOUNTER — APPOINTMENT (OUTPATIENT)
Dept: HEMATOLOGY ONCOLOGY | Facility: CLINIC | Age: 80
End: 2025-07-29

## 2025-07-29 VITALS
TEMPERATURE: 98 F | SYSTOLIC BLOOD PRESSURE: 106 MMHG | RESPIRATION RATE: 16 BRPM | DIASTOLIC BLOOD PRESSURE: 62 MMHG | HEART RATE: 63 BPM | OXYGEN SATURATION: 99 %

## 2025-07-29 LAB
ALBUMIN SERPL ELPH-MCNC: 3.7 G/DL
ALP BLD-CCNC: 217 U/L
ALT SERPL-CCNC: 73 U/L
ANION GAP SERPL CALC-SCNC: 14 MMOL/L
AST SERPL-CCNC: 42 U/L
BILIRUB SERPL-MCNC: 0.5 MG/DL
BUN SERPL-MCNC: 27 MG/DL
CALCIUM SERPL-MCNC: 9 MG/DL
CHLORIDE SERPL-SCNC: 104 MMOL/L
CO2 SERPL-SCNC: 20 MMOL/L
CREAT SERPL-MCNC: 1.48 MG/DL
EGFRCR SERPLBLD CKD-EPI 2021: 36 ML/MIN/1.73M2
GLUCOSE SERPL-MCNC: 111 MG/DL
MAGNESIUM SERPL-MCNC: 1.6 MG/DL
POTASSIUM SERPL-SCNC: 5.7 MMOL/L
PROT SERPL-MCNC: 6.6 G/DL
SODIUM SERPL-SCNC: 139 MMOL/L

## 2025-07-31 DIAGNOSIS — R79.9 ABNORMAL FINDING OF BLOOD CHEMISTRY, UNSPECIFIED: ICD-10-CM

## 2025-07-31 DIAGNOSIS — G89.29 OTHER CHRONIC PAIN: ICD-10-CM

## 2025-07-31 DIAGNOSIS — D64.9 ANEMIA, UNSPECIFIED: ICD-10-CM

## 2025-08-04 ENCOUNTER — RESULT REVIEW (OUTPATIENT)
Age: 80
End: 2025-08-04

## 2025-08-04 ENCOUNTER — APPOINTMENT (OUTPATIENT)
Dept: HEMATOLOGY ONCOLOGY | Facility: CLINIC | Age: 80
End: 2025-08-04

## 2025-08-04 LAB
ALBUMIN SERPL ELPH-MCNC: 4.1 G/DL
ALP BLD-CCNC: 149 U/L
ALT SERPL-CCNC: 37 U/L
ANION GAP SERPL CALC-SCNC: 13 MMOL/L
AST SERPL-CCNC: 22 U/L
BASOPHILS # BLD AUTO: 0.04 K/UL — SIGNIFICANT CHANGE UP (ref 0–0.2)
BASOPHILS NFR BLD AUTO: 0.4 % — SIGNIFICANT CHANGE UP (ref 0–2)
BILIRUB SERPL-MCNC: 0.7 MG/DL
BUN SERPL-MCNC: 21 MG/DL
CALCIUM SERPL-MCNC: 9.5 MG/DL
CHLORIDE SERPL-SCNC: 104 MMOL/L
CO2 SERPL-SCNC: 22 MMOL/L
CREAT SERPL-MCNC: 1.14 MG/DL
EGFRCR SERPLBLD CKD-EPI 2021: 49 ML/MIN/1.73M2
EOSINOPHIL # BLD AUTO: 0.08 K/UL — SIGNIFICANT CHANGE UP (ref 0–0.5)
EOSINOPHIL NFR BLD AUTO: 0.7 % — SIGNIFICANT CHANGE UP (ref 0–6)
GLUCOSE SERPL-MCNC: 85 MG/DL
HCT VFR BLD CALC: 26.2 % — LOW (ref 34.5–45)
HGB BLD-MCNC: 8.5 G/DL — LOW (ref 11.5–15.5)
IMM GRANULOCYTES # BLD AUTO: 0.16 K/UL — HIGH (ref 0–0.07)
IMM GRANULOCYTES NFR BLD AUTO: 1.4 % — HIGH (ref 0–0.9)
LYMPHOCYTES # BLD AUTO: 1.11 K/UL — SIGNIFICANT CHANGE UP (ref 1–3.3)
LYMPHOCYTES NFR BLD AUTO: 10.1 % — LOW (ref 13–44)
MAGNESIUM SERPL-MCNC: 1.5 MG/DL
MCHC RBC-ENTMCNC: 31 PG — SIGNIFICANT CHANGE UP (ref 27–34)
MCHC RBC-ENTMCNC: 32.4 G/DL — SIGNIFICANT CHANGE UP (ref 32–36)
MCV RBC AUTO: 95.6 FL — SIGNIFICANT CHANGE UP (ref 80–100)
MONOCYTES # BLD AUTO: 0.91 K/UL — HIGH (ref 0–0.9)
MONOCYTES NFR BLD AUTO: 8.2 % — SIGNIFICANT CHANGE UP (ref 2–14)
NEUTROPHILS # BLD AUTO: 8.74 K/UL — HIGH (ref 1.8–7.4)
NEUTROPHILS NFR BLD AUTO: 79.2 % — HIGH (ref 43–77)
NRBC # BLD AUTO: 0 K/UL — SIGNIFICANT CHANGE UP (ref 0–0)
NRBC # FLD: 0 K/UL — SIGNIFICANT CHANGE UP (ref 0–0)
NRBC BLD AUTO-RTO: 0 /100 WBCS — SIGNIFICANT CHANGE UP (ref 0–0)
PLATELET # BLD AUTO: 293 K/UL — SIGNIFICANT CHANGE UP (ref 150–400)
PMV BLD: 9.3 FL — SIGNIFICANT CHANGE UP (ref 7–13)
POTASSIUM SERPL-SCNC: 4.7 MMOL/L
PROT SERPL-MCNC: 7.1 G/DL
RBC # BLD: 2.74 M/UL — LOW (ref 3.8–5.2)
RBC # FLD: 18.6 % — HIGH (ref 10.3–14.5)
SODIUM SERPL-SCNC: 139 MMOL/L
WBC # BLD: 11.04 K/UL — HIGH (ref 3.8–10.5)
WBC # FLD AUTO: 11.04 K/UL — HIGH (ref 3.8–10.5)

## 2025-08-06 ENCOUNTER — APPOINTMENT (OUTPATIENT)
Age: 80
End: 2025-08-06

## 2025-08-06 ENCOUNTER — APPOINTMENT (OUTPATIENT)
Dept: HEMATOLOGY ONCOLOGY | Facility: CLINIC | Age: 80
End: 2025-08-06
Payer: MEDICARE

## 2025-08-06 VITALS
TEMPERATURE: 97.2 F | DIASTOLIC BLOOD PRESSURE: 63 MMHG | HEART RATE: 71 BPM | SYSTOLIC BLOOD PRESSURE: 162 MMHG | OXYGEN SATURATION: 98 % | WEIGHT: 133.05 LBS | HEIGHT: 62 IN | BODY MASS INDEX: 24.48 KG/M2

## 2025-08-06 DIAGNOSIS — C67.9 MALIGNANT NEOPLASM OF BLADDER, UNSPECIFIED: ICD-10-CM

## 2025-08-06 DIAGNOSIS — C68.9 MALIGNANT NEOPLASM OF URINARY ORGAN, UNSPECIFIED: ICD-10-CM

## 2025-08-06 PROCEDURE — 99214 OFFICE O/P EST MOD 30 MIN: CPT

## 2025-08-07 DIAGNOSIS — E86.0 DEHYDRATION: ICD-10-CM

## 2025-08-08 ENCOUNTER — APPOINTMENT (OUTPATIENT)
Age: 80
End: 2025-08-08

## 2025-08-13 ENCOUNTER — RESULT REVIEW (OUTPATIENT)
Age: 80
End: 2025-08-13

## 2025-08-13 ENCOUNTER — EMERGENCY (EMERGENCY)
Facility: HOSPITAL | Age: 80
LOS: 1 days | End: 2025-08-13
Attending: EMERGENCY MEDICINE
Payer: MEDICARE

## 2025-08-13 ENCOUNTER — APPOINTMENT (OUTPATIENT)
Age: 80
End: 2025-08-13

## 2025-08-13 VITALS
RESPIRATION RATE: 75 BRPM | HEART RATE: 18 BPM | SYSTOLIC BLOOD PRESSURE: 124 MMHG | HEIGHT: 62 IN | DIASTOLIC BLOOD PRESSURE: 43 MMHG | WEIGHT: 126.99 LBS | TEMPERATURE: 98 F | OXYGEN SATURATION: 98 %

## 2025-08-13 DIAGNOSIS — Z90.49 ACQUIRED ABSENCE OF OTHER SPECIFIED PARTS OF DIGESTIVE TRACT: Chronic | ICD-10-CM

## 2025-08-13 DIAGNOSIS — Z98.890 OTHER SPECIFIED POSTPROCEDURAL STATES: Chronic | ICD-10-CM

## 2025-08-13 DIAGNOSIS — Z98.61 CORONARY ANGIOPLASTY STATUS: Chronic | ICD-10-CM

## 2025-08-13 DIAGNOSIS — G56.00 CARPAL TUNNEL SYNDROME, UNSPECIFIED UPPER LIMB: Chronic | ICD-10-CM

## 2025-08-13 DIAGNOSIS — Z96.82 PRESENCE OF NEUROSTIMULATOR: Chronic | ICD-10-CM

## 2025-08-13 LAB
ALBUMIN SERPL ELPH-MCNC: 3.9 G/DL — SIGNIFICANT CHANGE UP (ref 3.3–5.2)
ALP SERPL-CCNC: 97 U/L — SIGNIFICANT CHANGE UP (ref 40–120)
ALT FLD-CCNC: 26 U/L — SIGNIFICANT CHANGE UP
ANION GAP SERPL CALC-SCNC: 14 MMOL/L — SIGNIFICANT CHANGE UP (ref 5–17)
ANISOCYTOSIS BLD QL: SLIGHT — SIGNIFICANT CHANGE UP
ANISOCYTOSIS BLD QL: SLIGHT — SIGNIFICANT CHANGE UP
AST SERPL-CCNC: 21 U/L — SIGNIFICANT CHANGE UP
BASO STIPL BLD QL SMEAR: PRESENT
BASOPHILS # BLD AUTO: 0.02 K/UL — SIGNIFICANT CHANGE UP (ref 0–0.2)
BASOPHILS # BLD AUTO: 0.02 K/UL — SIGNIFICANT CHANGE UP (ref 0–0.2)
BASOPHILS # BLD MANUAL: 0 K/UL — SIGNIFICANT CHANGE UP (ref 0–0.2)
BASOPHILS # BLD MANUAL: 0.03 K/UL — SIGNIFICANT CHANGE UP (ref 0–0.2)
BASOPHILS NFR BLD AUTO: 0.7 % — SIGNIFICANT CHANGE UP (ref 0–2)
BASOPHILS NFR BLD AUTO: 0.7 % — SIGNIFICANT CHANGE UP (ref 0–2)
BASOPHILS NFR BLD MANUAL: 0 % — SIGNIFICANT CHANGE UP (ref 0–2)
BASOPHILS NFR BLD MANUAL: 1 % — SIGNIFICANT CHANGE UP (ref 0–2)
BILIRUB SERPL-MCNC: 0.6 MG/DL — SIGNIFICANT CHANGE UP (ref 0.4–2)
BLD GP AB SCN SERPL QL: SIGNIFICANT CHANGE UP
BUN SERPL-MCNC: 29.7 MG/DL — HIGH (ref 8–20)
CALCIUM SERPL-MCNC: 9.3 MG/DL — SIGNIFICANT CHANGE UP (ref 8.4–10.5)
CHLORIDE SERPL-SCNC: 102 MMOL/L — SIGNIFICANT CHANGE UP (ref 96–108)
CO2 SERPL-SCNC: 21 MMOL/L — LOW (ref 22–29)
CREAT SERPL-MCNC: 1.36 MG/DL — HIGH (ref 0.5–1.3)
EGFR: 40 ML/MIN/1.73M2 — LOW
EGFR: 40 ML/MIN/1.73M2 — LOW
EOSINOPHIL # BLD AUTO: 0.04 K/UL — SIGNIFICANT CHANGE UP (ref 0–0.5)
EOSINOPHIL # BLD AUTO: 0.05 K/UL — SIGNIFICANT CHANGE UP (ref 0–0.5)
EOSINOPHIL # BLD MANUAL: 0.03 K/UL — SIGNIFICANT CHANGE UP (ref 0–0.5)
EOSINOPHIL # BLD MANUAL: 0.08 K/UL — SIGNIFICANT CHANGE UP (ref 0–0.5)
EOSINOPHIL NFR BLD AUTO: 1.5 % — SIGNIFICANT CHANGE UP (ref 0–6)
EOSINOPHIL NFR BLD AUTO: 1.7 % — SIGNIFICANT CHANGE UP (ref 0–6)
EOSINOPHIL NFR BLD MANUAL: 1 % — SIGNIFICANT CHANGE UP (ref 0–6)
EOSINOPHIL NFR BLD MANUAL: 2.6 % — SIGNIFICANT CHANGE UP (ref 0–6)
GIANT PLATELETS BLD QL SMEAR: PRESENT
GLUCOSE SERPL-MCNC: 91 MG/DL — SIGNIFICANT CHANGE UP (ref 70–99)
HCT VFR BLD CALC: 20.3 % — CRITICAL LOW (ref 34.5–45)
HCT VFR BLD CALC: 20.7 % — CRITICAL LOW (ref 34.5–45)
HGB BLD-MCNC: 6.6 G/DL — CRITICAL LOW (ref 11.5–15.5)
HGB BLD-MCNC: 6.7 G/DL — CRITICAL LOW (ref 11.5–15.5)
IMM GRANULOCYTES # BLD AUTO: 0.01 K/UL — SIGNIFICANT CHANGE UP (ref 0–0.07)
IMM GRANULOCYTES # BLD AUTO: 0.01 K/UL — SIGNIFICANT CHANGE UP (ref 0–0.07)
IMM GRANULOCYTES NFR BLD AUTO: 0.3 % — SIGNIFICANT CHANGE UP (ref 0–0.9)
IMM GRANULOCYTES NFR BLD AUTO: 0.4 % — SIGNIFICANT CHANGE UP (ref 0–0.9)
IMMATURE PLATELET FRACTION #: 1.9 K/UL — LOW (ref 4.7–11.1)
IMMATURE PLATELET FRACTION %: 1.6 % — SIGNIFICANT CHANGE UP (ref 1.6–4.9)
LG PLATELETS BLD QL AUTO: SLIGHT — SIGNIFICANT CHANGE UP
LYMPHOCYTES # BLD AUTO: 0.46 K/UL — LOW (ref 1–3.3)
LYMPHOCYTES # BLD AUTO: 0.66 K/UL — LOW (ref 1–3.3)
LYMPHOCYTES # BLD MANUAL: 0.53 K/UL — LOW (ref 1–3.3)
LYMPHOCYTES # BLD MANUAL: 0.69 K/UL — LOW (ref 1–3.3)
LYMPHOCYTES NFR BLD AUTO: 17.2 % — SIGNIFICANT CHANGE UP (ref 13–44)
LYMPHOCYTES NFR BLD AUTO: 22.5 % — SIGNIFICANT CHANGE UP (ref 13–44)
LYMPHOCYTES NFR BLD MANUAL: 20 % — SIGNIFICANT CHANGE UP (ref 13–44)
LYMPHOCYTES NFR BLD MANUAL: 23.5 % — SIGNIFICANT CHANGE UP (ref 13–44)
MACROCYTES BLD QL: SLIGHT — SIGNIFICANT CHANGE UP
MACROCYTES BLD QL: SLIGHT — SIGNIFICANT CHANGE UP
MANUAL OTHER CELLS #: 0.05 K/UL — HIGH (ref 0–0)
MANUAL OTHER CELLS %: 2 % — HIGH (ref 0–0)
MANUAL SMEAR VERIFICATION: YES — SIGNIFICANT CHANGE UP
MCHC RBC-ENTMCNC: 29.9 PG — SIGNIFICANT CHANGE UP (ref 27–34)
MCHC RBC-ENTMCNC: 30.7 PG — SIGNIFICANT CHANGE UP (ref 27–34)
MCHC RBC-ENTMCNC: 31.9 G/DL — LOW (ref 32–36)
MCHC RBC-ENTMCNC: 33 G/DL — SIGNIFICANT CHANGE UP (ref 32–36)
MCV RBC AUTO: 93.1 FL — SIGNIFICANT CHANGE UP (ref 80–100)
MCV RBC AUTO: 93.7 FL — SIGNIFICANT CHANGE UP (ref 80–100)
MICROCYTES BLD QL: SLIGHT — SIGNIFICANT CHANGE UP
MONOCYTES # BLD AUTO: 0.21 K/UL — SIGNIFICANT CHANGE UP (ref 0–0.9)
MONOCYTES # BLD AUTO: 0.23 K/UL — SIGNIFICANT CHANGE UP (ref 0–0.9)
MONOCYTES # BLD MANUAL: 0.05 K/UL — SIGNIFICANT CHANGE UP (ref 0–0.9)
MONOCYTES # BLD MANUAL: 0.08 K/UL — SIGNIFICANT CHANGE UP (ref 0–0.9)
MONOCYTES NFR BLD AUTO: 7.2 % — SIGNIFICANT CHANGE UP (ref 2–14)
MONOCYTES NFR BLD AUTO: 8.6 % — SIGNIFICANT CHANGE UP (ref 2–14)
MONOCYTES NFR BLD MANUAL: 1.7 % — LOW (ref 2–14)
MONOCYTES NFR BLD MANUAL: 3 % — SIGNIFICANT CHANGE UP (ref 2–14)
NEUTROPHILS # BLD AUTO: 1.91 K/UL — SIGNIFICANT CHANGE UP (ref 1.8–7.4)
NEUTROPHILS # BLD AUTO: 1.98 K/UL — SIGNIFICANT CHANGE UP (ref 1.8–7.4)
NEUTROPHILS # BLD MANUAL: 1.95 K/UL — SIGNIFICANT CHANGE UP (ref 1.8–7.4)
NEUTROPHILS # BLD MANUAL: 2.12 K/UL — SIGNIFICANT CHANGE UP (ref 1.8–7.4)
NEUTROPHILS NFR BLD AUTO: 67.6 % — SIGNIFICANT CHANGE UP (ref 43–77)
NEUTROPHILS NFR BLD AUTO: 71.6 % — SIGNIFICANT CHANGE UP (ref 43–77)
NEUTROPHILS NFR BLD MANUAL: 72.2 % — SIGNIFICANT CHANGE UP (ref 43–77)
NEUTROPHILS NFR BLD MANUAL: 73 % — SIGNIFICANT CHANGE UP (ref 43–77)
NRBC # BLD AUTO: 0 K/UL — SIGNIFICANT CHANGE UP (ref 0–0)
NRBC # BLD AUTO: 0 K/UL — SIGNIFICANT CHANGE UP (ref 0–0)
NRBC # FLD: 0 K/UL — SIGNIFICANT CHANGE UP (ref 0–0)
NRBC # FLD: 0 K/UL — SIGNIFICANT CHANGE UP (ref 0–0)
NRBC BLD AUTO-RTO: 0 /100 WBCS — SIGNIFICANT CHANGE UP (ref 0–0)
NRBC BLD AUTO-RTO: 0 /100 WBCS — SIGNIFICANT CHANGE UP (ref 0–0)
OVALOCYTES BLD QL SMEAR: SLIGHT — SIGNIFICANT CHANGE UP
PLAT MORPH BLD: NORMAL — SIGNIFICANT CHANGE UP
PLAT MORPH BLD: NORMAL — SIGNIFICANT CHANGE UP
PLATELET # BLD AUTO: 103 K/UL — LOW (ref 150–400)
PLATELET # BLD AUTO: 121 K/UL — LOW (ref 150–400)
PMV BLD: 10.2 FL — SIGNIFICANT CHANGE UP (ref 7–13)
PMV BLD: 9.1 FL — SIGNIFICANT CHANGE UP (ref 7–13)
POIKILOCYTOSIS BLD QL AUTO: SLIGHT — SIGNIFICANT CHANGE UP
POTASSIUM SERPL-MCNC: 4.9 MMOL/L — SIGNIFICANT CHANGE UP (ref 3.5–5.3)
POTASSIUM SERPL-SCNC: 4.9 MMOL/L — SIGNIFICANT CHANGE UP (ref 3.5–5.3)
PROT SERPL-MCNC: 6.9 G/DL — SIGNIFICANT CHANGE UP (ref 6.6–8.7)
RBC # BLD: 2.18 M/UL — LOW (ref 3.8–5.2)
RBC # BLD: 2.21 M/UL — LOW (ref 3.8–5.2)
RBC # FLD: 17.2 % — HIGH (ref 10.3–14.5)
RBC # FLD: 17.3 % — HIGH (ref 10.3–14.5)
RBC BLD AUTO: ABNORMAL
RBC BLD AUTO: SIGNIFICANT CHANGE UP
SODIUM SERPL-SCNC: 137 MMOL/L — SIGNIFICANT CHANGE UP (ref 135–145)
WBC # BLD: 2.67 K/UL — LOW (ref 3.8–10.5)
WBC # BLD: 2.93 K/UL — LOW (ref 3.8–10.5)
WBC # FLD AUTO: 2.67 K/UL — LOW (ref 3.8–10.5)
WBC # FLD AUTO: 2.93 K/UL — LOW (ref 3.8–10.5)

## 2025-08-13 PROCEDURE — 86900 BLOOD TYPING SEROLOGIC ABO: CPT

## 2025-08-13 PROCEDURE — 86901 BLOOD TYPING SEROLOGIC RH(D): CPT

## 2025-08-13 PROCEDURE — 36415 COLL VENOUS BLD VENIPUNCTURE: CPT

## 2025-08-13 PROCEDURE — 85025 COMPLETE CBC W/AUTO DIFF WBC: CPT

## 2025-08-13 PROCEDURE — 80053 COMPREHEN METABOLIC PANEL: CPT

## 2025-08-13 PROCEDURE — 86850 RBC ANTIBODY SCREEN: CPT

## 2025-08-13 PROCEDURE — 86923 COMPATIBILITY TEST ELECTRIC: CPT

## 2025-08-13 PROCEDURE — 99285 EMERGENCY DEPT VISIT HI MDM: CPT | Mod: GC

## 2025-08-14 VITALS
RESPIRATION RATE: 18 BRPM | OXYGEN SATURATION: 95 % | DIASTOLIC BLOOD PRESSURE: 75 MMHG | HEART RATE: 89 BPM | SYSTOLIC BLOOD PRESSURE: 163 MMHG | TEMPERATURE: 98 F

## 2025-08-14 PROCEDURE — P9040: CPT

## 2025-08-14 PROCEDURE — 86900 BLOOD TYPING SEROLOGIC ABO: CPT

## 2025-08-14 PROCEDURE — 36415 COLL VENOUS BLD VENIPUNCTURE: CPT

## 2025-08-14 PROCEDURE — 85025 COMPLETE CBC W/AUTO DIFF WBC: CPT

## 2025-08-14 PROCEDURE — 80053 COMPREHEN METABOLIC PANEL: CPT

## 2025-08-14 PROCEDURE — 36430 TRANSFUSION BLD/BLD COMPNT: CPT

## 2025-08-14 PROCEDURE — 99285 EMERGENCY DEPT VISIT HI MDM: CPT | Mod: 25

## 2025-08-14 PROCEDURE — 86850 RBC ANTIBODY SCREEN: CPT

## 2025-08-14 PROCEDURE — 86923 COMPATIBILITY TEST ELECTRIC: CPT

## 2025-08-14 PROCEDURE — 86901 BLOOD TYPING SEROLOGIC RH(D): CPT

## 2025-08-19 ENCOUNTER — APPOINTMENT (OUTPATIENT)
Dept: HEMATOLOGY ONCOLOGY | Facility: CLINIC | Age: 80
End: 2025-08-19

## 2025-08-25 ENCOUNTER — OUTPATIENT (OUTPATIENT)
Dept: OUTPATIENT SERVICES | Facility: HOSPITAL | Age: 80
LOS: 1 days | End: 2025-08-25
Payer: MEDICARE

## 2025-08-25 ENCOUNTER — APPOINTMENT (OUTPATIENT)
Dept: CT IMAGING | Facility: CLINIC | Age: 80
End: 2025-08-25
Payer: MEDICARE

## 2025-08-25 DIAGNOSIS — Z98.890 OTHER SPECIFIED POSTPROCEDURAL STATES: Chronic | ICD-10-CM

## 2025-08-25 DIAGNOSIS — G56.00 CARPAL TUNNEL SYNDROME, UNSPECIFIED UPPER LIMB: Chronic | ICD-10-CM

## 2025-08-25 DIAGNOSIS — Z96.82 PRESENCE OF NEUROSTIMULATOR: Chronic | ICD-10-CM

## 2025-08-25 DIAGNOSIS — Z90.49 ACQUIRED ABSENCE OF OTHER SPECIFIED PARTS OF DIGESTIVE TRACT: Chronic | ICD-10-CM

## 2025-08-25 DIAGNOSIS — C67.9 MALIGNANT NEOPLASM OF BLADDER, UNSPECIFIED: ICD-10-CM

## 2025-08-25 DIAGNOSIS — Z98.61 CORONARY ANGIOPLASTY STATUS: Chronic | ICD-10-CM

## 2025-08-25 PROCEDURE — 74178 CT ABD&PLV WO CNTR FLWD CNTR: CPT

## 2025-08-25 PROCEDURE — 74178 CT ABD&PLV WO CNTR FLWD CNTR: CPT | Mod: 26

## 2025-08-26 DIAGNOSIS — Z01.10 ENCOUNTER FOR EXAMINATION OF EARS AND HEARING W/OUT ABNORMAL FINDINGS: ICD-10-CM

## 2025-09-02 ENCOUNTER — APPOINTMENT (OUTPATIENT)
Dept: HEMATOLOGY ONCOLOGY | Facility: CLINIC | Age: 80
End: 2025-09-02
Payer: MEDICARE

## 2025-09-02 ENCOUNTER — RESULT REVIEW (OUTPATIENT)
Age: 80
End: 2025-09-02

## 2025-09-02 VITALS
HEIGHT: 62 IN | HEART RATE: 68 BPM | TEMPERATURE: 97.6 F | WEIGHT: 130 LBS | OXYGEN SATURATION: 98 % | DIASTOLIC BLOOD PRESSURE: 65 MMHG | BODY MASS INDEX: 23.92 KG/M2 | SYSTOLIC BLOOD PRESSURE: 172 MMHG

## 2025-09-02 PROCEDURE — G2211 COMPLEX E/M VISIT ADD ON: CPT

## 2025-09-02 PROCEDURE — 99214 OFFICE O/P EST MOD 30 MIN: CPT

## 2025-09-10 ENCOUNTER — APPOINTMENT (OUTPATIENT)
Dept: UROLOGY | Facility: CLINIC | Age: 80
End: 2025-09-10
Payer: MEDICARE

## 2025-09-10 VITALS
HEIGHT: 62 IN | HEART RATE: 68 BPM | WEIGHT: 133 LBS | OXYGEN SATURATION: 98 % | BODY MASS INDEX: 24.48 KG/M2 | DIASTOLIC BLOOD PRESSURE: 62 MMHG | SYSTOLIC BLOOD PRESSURE: 140 MMHG

## 2025-09-10 DIAGNOSIS — C68.9 MALIGNANT NEOPLASM OF URINARY ORGAN, UNSPECIFIED: ICD-10-CM

## 2025-09-10 PROCEDURE — 99214 OFFICE O/P EST MOD 30 MIN: CPT

## 2025-09-10 PROCEDURE — G2211 COMPLEX E/M VISIT ADD ON: CPT

## 2025-09-11 ENCOUNTER — APPOINTMENT (OUTPATIENT)
Dept: DERMATOLOGY | Facility: CLINIC | Age: 80
End: 2025-09-11
Payer: MEDICARE

## 2025-09-11 PROCEDURE — 99213 OFFICE O/P EST LOW 20 MIN: CPT | Mod: 25

## 2025-09-11 PROCEDURE — 17000 DESTRUCT PREMALG LESION: CPT

## 2025-09-26 PROBLEM — Z01.818 PREOPERATIVE CLEARANCE: Status: ACTIVE | Noted: 2025-09-26

## 2025-09-26 PROBLEM — E87.5 HYPERKALEMIA: Status: ACTIVE | Noted: 2025-09-26

## 2025-09-26 PROBLEM — Z86.39 HISTORY OF HYPERKALEMIA: Status: RESOLVED | Noted: 2024-01-13 | Resolved: 2025-09-26

## 2025-09-26 PROBLEM — Z01.818 PREOPERATIVE CLEARANCE: Status: RESOLVED | Noted: 2025-02-06 | Resolved: 2025-09-26

## (undated) DEVICE — GOWN XL W TOWEL

## (undated) DEVICE — CLAMP BX URETERSP FLEX 1MM 15CM

## (undated) DEVICE — MASK PROCEDURE EARLOOP LVL 2 50/BX

## (undated) DEVICE — GLV 8 PROTEXIS (WHITE)

## (undated) DEVICE — VENODYNE/SCD SLEEVE CALF MEDIUM

## (undated) DEVICE — POLY TRAP ETRAP

## (undated) DEVICE — PACK CYSTOSCOPY TIBURON

## (undated) DEVICE — ELCTR GROUNDING PAD ADULT COVIDIEN

## (undated) DEVICE — PACK IV START WITH CHG

## (undated) DEVICE — DRSG CURITY GAUZE SPONGE 4 X 4" 12-PLY NON-STERILE

## (undated) DEVICE — TUBING ALARIS PUMP MODULE NON-DEHP

## (undated) DEVICE — NDL HYPO SAFE 18G X 1.5" (PINK)

## (undated) DEVICE — SENSOR O2 FINGER ADULT

## (undated) DEVICE — TUBING IRR SET FOR CYSTOSCOPY 77"

## (undated) DEVICE — VISITEC 4X4

## (undated) DEVICE — TUBING TUR 2 PRONG

## (undated) DEVICE — GOWN IMPERV XL

## (undated) DEVICE — TUBING IV EXTENSION MACRO W CLAVE 7"

## (undated) DEVICE — DRSG TELFA 3 X 4

## (undated) DEVICE — SNARE LESIONHUNTER ROTAT NITNL COLD 10MM

## (undated) DEVICE — FORCEP RADIAL JAW 4 W NDL 2.4MM 2.8MM 240CM ORANGE DISP

## (undated) DEVICE — CATH IV SAFE BC 22G X 1" (BLUE)

## (undated) DEVICE — UNDERPAD LINEN SAVER 23 X 36"

## (undated) DEVICE — SYR LUER SLIP TIP 50CC

## (undated) DEVICE — SOL BAG NS 0.9% 1000ML

## (undated) DEVICE — POSITIONER FOAM EGG CRATE ULNAR 2PCS (PINK)

## (undated) DEVICE — DRSG 2X2

## (undated) DEVICE — SOL IRR BAG H2O 3000ML

## (undated) DEVICE — Device

## (undated) DEVICE — SOL IRR BAG H2O 1000ML

## (undated) DEVICE — SYR IV FLUSH SALINE 10ML 30/TY

## (undated) DEVICE — SYR SLIP 10CC

## (undated) DEVICE — DENTURE CUP PINK